# Patient Record
Sex: FEMALE | Race: WHITE | Employment: OTHER | URBAN - NONMETROPOLITAN AREA
[De-identification: names, ages, dates, MRNs, and addresses within clinical notes are randomized per-mention and may not be internally consistent; named-entity substitution may affect disease eponyms.]

---

## 2011-12-14 LAB
CHOLESTEROL, TOTAL: 256 MG/DL
CHOLESTEROL/HDL RATIO: ABNORMAL
HDLC SERPL-MCNC: 48 MG/DL (ref 35–70)
LDL CHOLESTEROL CALCULATED: 202 MG/DL (ref 0–160)
TRIGL SERPL-MCNC: 166 MG/DL
VLDLC SERPL CALC-MCNC: 33 MG/DL

## 2017-05-13 ENCOUNTER — NURSE TRIAGE (OUTPATIENT)
Dept: ADMINISTRATIVE | Age: 57
End: 2017-05-13

## 2017-05-15 PROBLEM — I63.311 CEREBROVASCULAR ACCIDENT (CVA) DUE TO THROMBOSIS OF RIGHT MIDDLE CEREBRAL ARTERY (HCC): Status: ACTIVE | Noted: 2017-05-15

## 2017-05-19 PROBLEM — E78.5 DYSLIPIDEMIA: Status: ACTIVE | Noted: 2017-05-19

## 2017-05-31 ENCOUNTER — OFFICE VISIT (OUTPATIENT)
Dept: CARDIOLOGY | Age: 57
End: 2017-05-31

## 2017-05-31 VITALS
BODY MASS INDEX: 34.41 KG/M2 | HEART RATE: 77 BPM | SYSTOLIC BLOOD PRESSURE: 128 MMHG | WEIGHT: 206.5 LBS | HEIGHT: 65 IN | DIASTOLIC BLOOD PRESSURE: 86 MMHG

## 2017-05-31 DIAGNOSIS — I63.9 STROKE OF UNKNOWN CAUSE (HCC): ICD-10-CM

## 2017-05-31 DIAGNOSIS — R06.02 SOB (SHORTNESS OF BREATH): Primary | ICD-10-CM

## 2017-05-31 PROCEDURE — G8598 ASA/ANTIPLAT THER USED: HCPCS | Performed by: INTERNAL MEDICINE

## 2017-05-31 PROCEDURE — 3014F SCREEN MAMMO DOC REV: CPT | Performed by: INTERNAL MEDICINE

## 2017-05-31 PROCEDURE — 3017F COLORECTAL CA SCREEN DOC REV: CPT | Performed by: INTERNAL MEDICINE

## 2017-05-31 PROCEDURE — 1111F DSCHRG MED/CURRENT MED MERGE: CPT | Performed by: INTERNAL MEDICINE

## 2017-05-31 PROCEDURE — 4004F PT TOBACCO SCREEN RCVD TLK: CPT | Performed by: INTERNAL MEDICINE

## 2017-05-31 PROCEDURE — 93000 ELECTROCARDIOGRAM COMPLETE: CPT | Performed by: INTERNAL MEDICINE

## 2017-05-31 PROCEDURE — G8427 DOCREV CUR MEDS BY ELIG CLIN: HCPCS | Performed by: INTERNAL MEDICINE

## 2017-05-31 PROCEDURE — G8419 CALC BMI OUT NRM PARAM NOF/U: HCPCS | Performed by: INTERNAL MEDICINE

## 2017-05-31 PROCEDURE — 99204 OFFICE O/P NEW MOD 45 MIN: CPT | Performed by: INTERNAL MEDICINE

## 2017-05-31 ASSESSMENT — ENCOUNTER SYMPTOMS
BACK PAIN: 0
WHEEZING: 0
NAUSEA: 0
SORE THROAT: 0
VOMITING: 0
SHORTNESS OF BREATH: 0
CONSTIPATION: 0
RIGHT EYE: 0
LEFT EYE: 0
BLURRED VISION: 0
COUGH: 0
DOUBLE VISION: 0
DIARRHEA: 0
ABDOMINAL PAIN: 0

## 2017-06-14 ENCOUNTER — INITIAL CONSULT (OUTPATIENT)
Dept: PHYSICAL MEDICINE AND REHAB | Age: 57
End: 2017-06-14

## 2017-06-14 VITALS
DIASTOLIC BLOOD PRESSURE: 81 MMHG | BODY MASS INDEX: 35.12 KG/M2 | SYSTOLIC BLOOD PRESSURE: 137 MMHG | HEART RATE: 80 BPM | HEIGHT: 65 IN | WEIGHT: 210.8 LBS

## 2017-06-14 DIAGNOSIS — R90.89 ABNORMAL FINDING ON MRI OF BRAIN: ICD-10-CM

## 2017-06-14 DIAGNOSIS — I69.30 HISTORY OF STROKE WITH RESIDUAL DEFICIT: Primary | ICD-10-CM

## 2017-06-14 PROCEDURE — 3017F COLORECTAL CA SCREEN DOC REV: CPT | Performed by: PSYCHIATRY & NEUROLOGY

## 2017-06-14 PROCEDURE — G8427 DOCREV CUR MEDS BY ELIG CLIN: HCPCS | Performed by: PSYCHIATRY & NEUROLOGY

## 2017-06-14 PROCEDURE — 3014F SCREEN MAMMO DOC REV: CPT | Performed by: PSYCHIATRY & NEUROLOGY

## 2017-06-14 PROCEDURE — 99205 OFFICE O/P NEW HI 60 MIN: CPT | Performed by: PSYCHIATRY & NEUROLOGY

## 2017-06-14 PROCEDURE — 1111F DSCHRG MED/CURRENT MED MERGE: CPT | Performed by: PSYCHIATRY & NEUROLOGY

## 2017-06-14 PROCEDURE — G8417 CALC BMI ABV UP PARAM F/U: HCPCS | Performed by: PSYCHIATRY & NEUROLOGY

## 2017-06-14 PROCEDURE — 4004F PT TOBACCO SCREEN RCVD TLK: CPT | Performed by: PSYCHIATRY & NEUROLOGY

## 2017-06-14 PROCEDURE — G8598 ASA/ANTIPLAT THER USED: HCPCS | Performed by: PSYCHIATRY & NEUROLOGY

## 2017-06-14 RX ORDER — FOLIC ACID 1 MG/1
1 TABLET ORAL DAILY
Qty: 30 TABLET | Refills: 11 | Status: SHIPPED | OUTPATIENT
Start: 2017-06-14 | End: 2018-06-15 | Stop reason: SDUPTHER

## 2017-06-19 ENCOUNTER — TELEPHONE (OUTPATIENT)
Dept: PHYSICAL MEDICINE AND REHAB | Age: 57
End: 2017-06-19

## 2017-06-21 ENCOUNTER — OFFICE VISIT (OUTPATIENT)
Dept: PHYSICAL MEDICINE AND REHAB | Age: 57
End: 2017-06-21

## 2017-06-21 VITALS
DIASTOLIC BLOOD PRESSURE: 78 MMHG | BODY MASS INDEX: 35.49 KG/M2 | HEIGHT: 65 IN | SYSTOLIC BLOOD PRESSURE: 142 MMHG | WEIGHT: 213 LBS | HEART RATE: 82 BPM

## 2017-06-21 DIAGNOSIS — I69.30 HISTORY OF STROKE WITH RESIDUAL DEFICIT: Primary | ICD-10-CM

## 2017-06-21 DIAGNOSIS — R90.89 ABNORMAL FINDING ON MRI OF BRAIN: ICD-10-CM

## 2017-06-21 PROCEDURE — 99213 OFFICE O/P EST LOW 20 MIN: CPT | Performed by: PSYCHIATRY & NEUROLOGY

## 2017-06-21 PROCEDURE — G8417 CALC BMI ABV UP PARAM F/U: HCPCS | Performed by: PSYCHIATRY & NEUROLOGY

## 2017-06-21 PROCEDURE — G8427 DOCREV CUR MEDS BY ELIG CLIN: HCPCS | Performed by: PSYCHIATRY & NEUROLOGY

## 2017-06-21 PROCEDURE — 3014F SCREEN MAMMO DOC REV: CPT | Performed by: PSYCHIATRY & NEUROLOGY

## 2017-06-21 PROCEDURE — 3017F COLORECTAL CA SCREEN DOC REV: CPT | Performed by: PSYCHIATRY & NEUROLOGY

## 2017-06-21 PROCEDURE — 4004F PT TOBACCO SCREEN RCVD TLK: CPT | Performed by: PSYCHIATRY & NEUROLOGY

## 2017-06-21 PROCEDURE — G8598 ASA/ANTIPLAT THER USED: HCPCS | Performed by: PSYCHIATRY & NEUROLOGY

## 2017-06-22 PROBLEM — I63.9 CRYPTOGENIC STROKE (HCC): Status: ACTIVE | Noted: 2017-05-15

## 2017-07-20 ENCOUNTER — NURSE ONLY (OUTPATIENT)
Dept: CARDIOLOGY CLINIC | Age: 57
End: 2017-07-20
Payer: MEDICARE

## 2017-07-20 DIAGNOSIS — Z45.09 ENCOUNTER FOR ELECTRONIC ANALYSIS OF REVEAL EVENT RECORDER: Primary | ICD-10-CM

## 2017-07-20 PROCEDURE — 93285 PRGRMG DEV EVAL SCRMS IP: CPT | Performed by: INTERNAL MEDICINE

## 2017-08-25 ENCOUNTER — PROCEDURE VISIT (OUTPATIENT)
Dept: CARDIOLOGY CLINIC | Age: 57
End: 2017-08-25
Payer: MEDICARE

## 2017-08-25 DIAGNOSIS — Z45.09 ENCOUNTER FOR ELECTRONIC ANALYSIS OF REVEAL EVENT RECORDER: Primary | ICD-10-CM

## 2017-08-25 PROCEDURE — 93298 REM INTERROG DEV EVAL SCRMS: CPT | Performed by: INTERNAL MEDICINE

## 2017-08-28 ENCOUNTER — TELEPHONE (OUTPATIENT)
Dept: CARDIOLOGY CLINIC | Age: 57
End: 2017-08-28

## 2017-08-30 ENCOUNTER — OFFICE VISIT (OUTPATIENT)
Dept: NEUROLOGY | Age: 57
End: 2017-08-30
Payer: MEDICARE

## 2017-08-30 VITALS
HEART RATE: 81 BPM | HEIGHT: 65 IN | WEIGHT: 218 LBS | BODY MASS INDEX: 36.32 KG/M2 | DIASTOLIC BLOOD PRESSURE: 81 MMHG | SYSTOLIC BLOOD PRESSURE: 134 MMHG

## 2017-08-30 DIAGNOSIS — R90.89 ABNORMAL FINDING ON MRI OF BRAIN: ICD-10-CM

## 2017-08-30 DIAGNOSIS — I69.30 HISTORY OF STROKE WITH RESIDUAL DEFICIT: Primary | ICD-10-CM

## 2017-08-30 PROCEDURE — 3017F COLORECTAL CA SCREEN DOC REV: CPT | Performed by: PSYCHIATRY & NEUROLOGY

## 2017-08-30 PROCEDURE — 4004F PT TOBACCO SCREEN RCVD TLK: CPT | Performed by: PSYCHIATRY & NEUROLOGY

## 2017-08-30 PROCEDURE — G8598 ASA/ANTIPLAT THER USED: HCPCS | Performed by: PSYCHIATRY & NEUROLOGY

## 2017-08-30 PROCEDURE — G8417 CALC BMI ABV UP PARAM F/U: HCPCS | Performed by: PSYCHIATRY & NEUROLOGY

## 2017-08-30 PROCEDURE — 99213 OFFICE O/P EST LOW 20 MIN: CPT | Performed by: PSYCHIATRY & NEUROLOGY

## 2017-08-30 PROCEDURE — G8427 DOCREV CUR MEDS BY ELIG CLIN: HCPCS | Performed by: PSYCHIATRY & NEUROLOGY

## 2017-08-30 PROCEDURE — 3014F SCREEN MAMMO DOC REV: CPT | Performed by: PSYCHIATRY & NEUROLOGY

## 2017-09-25 ENCOUNTER — PROCEDURE VISIT (OUTPATIENT)
Dept: CARDIOLOGY CLINIC | Age: 57
End: 2017-09-25
Payer: MEDICARE

## 2017-09-25 DIAGNOSIS — Z45.09 ENCOUNTER FOR ELECTRONIC ANALYSIS OF REVEAL EVENT RECORDER: Primary | ICD-10-CM

## 2017-09-25 PROCEDURE — 93298 REM INTERROG DEV EVAL SCRMS: CPT | Performed by: INTERNAL MEDICINE

## 2017-09-28 ENCOUNTER — TELEPHONE (OUTPATIENT)
Dept: CARDIOLOGY CLINIC | Age: 57
End: 2017-09-28

## 2017-10-26 ENCOUNTER — PROCEDURE VISIT (OUTPATIENT)
Dept: CARDIOLOGY CLINIC | Age: 57
End: 2017-10-26
Payer: MEDICARE

## 2017-10-26 DIAGNOSIS — Z45.09 ENCOUNTER FOR ELECTRONIC ANALYSIS OF REVEAL EVENT RECORDER: Primary | ICD-10-CM

## 2017-10-26 PROCEDURE — 93298 REM INTERROG DEV EVAL SCRMS: CPT | Performed by: INTERNAL MEDICINE

## 2017-12-01 ENCOUNTER — PROCEDURE VISIT (OUTPATIENT)
Dept: CARDIOLOGY CLINIC | Age: 57
End: 2017-12-01
Payer: MEDICARE

## 2017-12-01 DIAGNOSIS — Z45.09 ENCOUNTER FOR ELECTRONIC ANALYSIS OF REVEAL EVENT RECORDER: Primary | ICD-10-CM

## 2017-12-01 PROCEDURE — 93298 REM INTERROG DEV EVAL SCRMS: CPT | Performed by: INTERNAL MEDICINE

## 2017-12-08 ENCOUNTER — TELEPHONE (OUTPATIENT)
Dept: CARDIOLOGY CLINIC | Age: 57
End: 2017-12-08

## 2017-12-08 NOTE — TELEPHONE ENCOUNTER
UNSCHEDULED LINQ  APPEARS THAT PT HAD A 3 SECOND PAUSE ON 12/06/17 @ 18:43PM  PT NOTIFIED AND SHE DOES NOT RECALL HAVING ANY SYMPTOMS AT THAT TIME. SHE SAID SHE DOES HAVE A VERY BAD COLD.  NO DX OF SLEEP APNEA

## 2018-01-02 ENCOUNTER — PROCEDURE VISIT (OUTPATIENT)
Dept: CARDIOLOGY CLINIC | Age: 58
End: 2018-01-02
Payer: MEDICARE

## 2018-01-02 DIAGNOSIS — Z45.09 ENCOUNTER FOR ELECTRONIC ANALYSIS OF REVEAL EVENT RECORDER: Primary | ICD-10-CM

## 2018-01-02 PROCEDURE — 93298 REM INTERROG DEV EVAL SCRMS: CPT | Performed by: INTERNAL MEDICINE

## 2018-02-08 ENCOUNTER — PROCEDURE VISIT (OUTPATIENT)
Dept: CARDIOLOGY CLINIC | Age: 58
End: 2018-02-08
Payer: MEDICARE

## 2018-02-08 DIAGNOSIS — Z45.09 ENCOUNTER FOR ELECTRONIC ANALYSIS OF REVEAL EVENT RECORDER: Primary | ICD-10-CM

## 2018-02-08 PROCEDURE — 93298 REM INTERROG DEV EVAL SCRMS: CPT | Performed by: INTERNAL MEDICINE

## 2018-02-28 ENCOUNTER — OFFICE VISIT (OUTPATIENT)
Dept: NEUROLOGY | Age: 58
End: 2018-02-28
Payer: MEDICARE

## 2018-02-28 VITALS
DIASTOLIC BLOOD PRESSURE: 86 MMHG | SYSTOLIC BLOOD PRESSURE: 126 MMHG | BODY MASS INDEX: 37.82 KG/M2 | WEIGHT: 227 LBS | HEART RATE: 83 BPM | HEIGHT: 65 IN

## 2018-02-28 DIAGNOSIS — I69.30 HISTORY OF STROKE WITH RESIDUAL DEFICIT: Primary | ICD-10-CM

## 2018-02-28 PROCEDURE — G8599 NO ASA/ANTIPLAT THER USE RNG: HCPCS | Performed by: PSYCHIATRY & NEUROLOGY

## 2018-02-28 PROCEDURE — 3017F COLORECTAL CA SCREEN DOC REV: CPT | Performed by: PSYCHIATRY & NEUROLOGY

## 2018-02-28 PROCEDURE — G8417 CALC BMI ABV UP PARAM F/U: HCPCS | Performed by: PSYCHIATRY & NEUROLOGY

## 2018-02-28 PROCEDURE — 4004F PT TOBACCO SCREEN RCVD TLK: CPT | Performed by: PSYCHIATRY & NEUROLOGY

## 2018-02-28 PROCEDURE — G8427 DOCREV CUR MEDS BY ELIG CLIN: HCPCS | Performed by: PSYCHIATRY & NEUROLOGY

## 2018-02-28 PROCEDURE — 99213 OFFICE O/P EST LOW 20 MIN: CPT | Performed by: PSYCHIATRY & NEUROLOGY

## 2018-02-28 PROCEDURE — 3014F SCREEN MAMMO DOC REV: CPT | Performed by: PSYCHIATRY & NEUROLOGY

## 2018-02-28 PROCEDURE — G8484 FLU IMMUNIZE NO ADMIN: HCPCS | Performed by: PSYCHIATRY & NEUROLOGY

## 2018-02-28 NOTE — PATIENT INSTRUCTIONS
1. Continue with Folic acid 1 mg daily. Refills given. 2. Continue with antiplatelets. 3. Modify risk factors for stroke (blood pressure, cholesterol, diabetes, smoking cessation etc.. Control)   4. Continue with Cardiology work up with loop recorder. 5. Need to quit smoking. 6. Call with any new symptoms or concerns. 7. Follow up as needed.

## 2018-02-28 NOTE — PROGRESS NOTES
daily (before meals and nightly) (Patient taking differently: Take 10 mg by mouth 4 times daily as needed ), Disp: 30 capsule, Rfl: 3    albuterol sulfate HFA (PROAIR HFA) 108 (90 BASE) MCG/ACT inhaler, Inhale 2 puffs into the lungs every 6 hours as needed for Wheezing, Disp: 1 Inhaler, Rfl: 3    amLODIPine (NORVASC) 10 MG tablet, Take 1 tablet by mouth daily, Disp: 30 tablet, Rfl: 5      PE:   Vitals:    02/28/18 1015   BP: 126/86   Site: Left Arm   Position: Sitting   Pulse: 83   Weight: 227 lb (103 kg)   Height: 5' 5\" (1.651 m)     General Appearance:  alert, cooperative and obese  Skin:  Skin color, texture, turgor normal. No rashes or lesions. Gen: AO3, NAD, Language is Intact. Head: PERRL, EOMI, no icterus  Neck: There is no carotid bruits. The Neck is supple. Neuro: CN 2-12 grossly intact with no focal deficits. Power 5/5 Throughout symmetric, Reflexes are symmetric. Long tracts are intact. Cerebellar exam is Intact. Sensory exam is intact to light touch. Gait is intact. Musculoskeletal:  Has no hand arthritis, no limitation of ROM in any of the four extremities.    Lower extremities no edema        DATA:  Results for orders placed or performed during the hospital encounter of 06/14/17   EAN Screen With Reflex   Result Value Ref Range    EAN SCREEN None Detected None Detec   Anti-DNA Antibody, Double-Stranded   Result Value Ref Range    dsDNA Ab SEE BELOW    Sedimentation Rate   Result Value Ref Range    Sed Rate 15 0 - 20 mm/hr   Rheumatoid Factor   Result Value Ref Range    Rheumatoid Factor < 10 0 - 13 IU/mL   CBC   Result Value Ref Range    WBC 6.2 4.8 - 10.8 thou/mm3    RBC 5.05 4.20 - 5.40 mill/mm3    Hemoglobin 15.3 12.0 - 16.0 gm/dl    Hematocrit 46.4 37.0 - 47.0 %    MCV 92.0 81.0 - 99.0 fL    MCH 30.3 27.0 - 31.0 pg    MCHC 33.0 33.0 - 37.0 gm/dl    RDW 14.4 11.5 - 14.5 %    Platelets 528 804 - 462 thou/mm3    MPV 9.4 7.4 - 10.4 mcm   Basic Metabolic Panel   Result Value Ref Range    Sodium 144 135 - 145 meq/L    Potassium 4.2 3.5 - 5.2 meq/L    Chloride 103 98 - 111 meq/L    CO2 26 23 - 33 meq/L    Glucose 86 70 - 108 mg/dL    BUN 17 7 - 22 mg/dL    CREATININE 1.0 0.4 - 1.2 mg/dL    Calcium 9.9 8.5 - 10.5 mg/dL   Anti SSA   Result Value Ref Range    Anti SSA Not Detected    Anti SSB   Result Value Ref Range    Anti SSB 1 0 - 40 AU/mL   Anion Gap   Result Value Ref Range    Anion Gap 15.0 8.0 - 16.0 meq/L   Glomerular Filtration Rate, Estimated   Result Value Ref Range    Est, Glom Filt Rate 57 (A) ml/min/1.73m2        MRI Brain:  6/16/2017    I reviewed the MRI brain and agree with interpretation. FINDINGS:       The brain parenchymal volume is age appropriate. There is redemonstration of multiple foci of hyperintense T2 signal within the bilateral deep cerebral white matter. Edema is again noted along the cortical margins corresponding to areas of edema from    prior infarcts. No impeded diffusion is identified to suggest new, acute infarct. The ventricles are midline without evidence of hydrocephalus. Small areas of susceptibility corresponding to the areas of prior infarct likely represent microhemorrhage. No    mass, mass effect or extra-axial fluid collection is identified. The basal cisterns and visualized vascular flow voids are patent. The pituitary, brain stem and cervical medullary junction are within normal limits. There is enhancement also    corresponding to the areas of edema within the right superior temporal lobe and posterior lateral right temporal lobe.       The visualized orbits are unremarkable. Partial fluid opacification is again noted of the bilateral mastoid air cells and there is mucosal thickening within the sphenoid and ethmoid sinuses, similar to the prior exam.           Impression        Expected evolutionary changes are noted of multiple, punctate infarcts involving the right frontal, temporal and parietal lobes. No new infarct is identified. Assessment:    1. History of stroke with residual deficit        The patient denies any new symptoms. She is still undergoing the loop recorder, she had 3 second pause in her hear rhythm. She is doing well. She is on Plavix and Folic acid 1 mg daily. she is still smoking. She was counseled again about quitting smoking again. No headaches. Her exam is stable. After detailed discussion with patient we agreed on the following plan. Plan:  1. Continue with Folic acid 1 mg daily. Refills given. 2. Continue with antiplatelets. 3. Modify risk factors for stroke (blood pressure, cholesterol, diabetes, smoking cessation etc.. Control)   4. Continue with Cardiology work up with loop recorder. 5. Need to quit smoking. 6. Call with any new symptoms or concerns. 7. Follow up as needed. Please call if any questions.      Leoncio Bob MD

## 2018-03-13 ENCOUNTER — PROCEDURE VISIT (OUTPATIENT)
Dept: CARDIOLOGY CLINIC | Age: 58
End: 2018-03-13
Payer: MEDICARE

## 2018-03-13 DIAGNOSIS — I63.9 CRYPTOGENIC STROKE (HCC): Primary | ICD-10-CM

## 2018-03-13 PROCEDURE — 93298 REM INTERROG DEV EVAL SCRMS: CPT | Performed by: INTERNAL MEDICINE

## 2018-03-15 ENCOUNTER — APPOINTMENT (OUTPATIENT)
Dept: GENERAL RADIOLOGY | Age: 58
End: 2018-03-15
Payer: MEDICARE

## 2018-03-15 ENCOUNTER — HOSPITAL ENCOUNTER (EMERGENCY)
Age: 58
Discharge: HOME | End: 2018-03-16
Payer: MEDICARE

## 2018-03-15 ENCOUNTER — NURSE TRIAGE (OUTPATIENT)
Dept: ADMINISTRATIVE | Age: 58
End: 2018-03-15

## 2018-03-15 ENCOUNTER — APPOINTMENT (OUTPATIENT)
Dept: CT IMAGING | Age: 58
End: 2018-03-15
Payer: MEDICARE

## 2018-03-15 DIAGNOSIS — K52.9 GASTROENTERITIS: Primary | ICD-10-CM

## 2018-03-15 LAB
ALBUMIN SERPL-MCNC: 4.5 G/DL (ref 3.5–5.1)
ALP BLD-CCNC: 130 U/L (ref 38–126)
ALT SERPL-CCNC: 17 U/L (ref 11–66)
ANION GAP SERPL CALCULATED.3IONS-SCNC: 16 MEQ/L (ref 8–16)
ANISOCYTOSIS: ABNORMAL
AST SERPL-CCNC: 16 U/L (ref 5–40)
BASOPHILS # BLD: 0.3 %
BASOPHILS ABSOLUTE: 0 THOU/MM3 (ref 0–0.1)
BILIRUB SERPL-MCNC: 0.5 MG/DL (ref 0.3–1.2)
BILIRUBIN DIRECT: < 0.2 MG/DL (ref 0–0.3)
BUN BLDV-MCNC: 15 MG/DL (ref 7–22)
CALCIUM SERPL-MCNC: 9.6 MG/DL (ref 8.5–10.5)
CHLORIDE BLD-SCNC: 100 MEQ/L (ref 98–111)
CO2: 21 MEQ/L (ref 23–33)
CREAT SERPL-MCNC: 0.7 MG/DL (ref 0.4–1.2)
D-DIMER QUANTITATIVE: 414 NG/ML FEU (ref 0–500)
EKG ATRIAL RATE: 91 BPM
EKG P AXIS: 73 DEGREES
EKG P-R INTERVAL: 160 MS
EKG Q-T INTERVAL: 406 MS
EKG QRS DURATION: 130 MS
EKG QTC CALCULATION (BAZETT): 499 MS
EKG R AXIS: 48 DEGREES
EKG T AXIS: 102 DEGREES
EKG VENTRICULAR RATE: 91 BPM
EOSINOPHIL # BLD: 0 %
EOSINOPHILS ABSOLUTE: 0 THOU/MM3 (ref 0–0.4)
GFR SERPL CREATININE-BSD FRML MDRD: 86 ML/MIN/1.73M2
GLUCOSE BLD-MCNC: 141 MG/DL (ref 70–108)
HCT VFR BLD CALC: 48 % (ref 37–47)
HEMOGLOBIN: 15.7 GM/DL (ref 12–16)
LIPASE: 28.6 U/L (ref 5.6–51.3)
LYMPHOCYTES # BLD: 10 %
LYMPHOCYTES ABSOLUTE: 1.1 THOU/MM3 (ref 1–4.8)
MAGNESIUM: 2 MG/DL (ref 1.6–2.4)
MCH RBC QN AUTO: 29.6 PG (ref 27–31)
MCHC RBC AUTO-ENTMCNC: 32.7 GM/DL (ref 33–37)
MCV RBC AUTO: 90.6 FL (ref 81–99)
MONOCYTES # BLD: 3.5 %
MONOCYTES ABSOLUTE: 0.4 THOU/MM3 (ref 0.4–1.3)
NUCLEATED RED BLOOD CELLS: 0 /100 WBC
OSMOLALITY CALCULATION: 277 MOSMOL/KG (ref 275–300)
PDW BLD-RTO: 14.8 % (ref 11.5–14.5)
PLATELET # BLD: 226 THOU/MM3 (ref 130–400)
PMV BLD AUTO: 10.1 FL (ref 7.4–10.4)
POTASSIUM SERPL-SCNC: 4 MEQ/L (ref 3.5–5.2)
PRO-BNP: 165.1 PG/ML (ref 0–900)
RBC # BLD: 5.29 MILL/MM3 (ref 4.2–5.4)
SEG NEUTROPHILS: 86.2 %
SEGMENTED NEUTROPHILS ABSOLUTE COUNT: 9.7 THOU/MM3 (ref 1.8–7.7)
SODIUM BLD-SCNC: 137 MEQ/L (ref 135–145)
TOTAL PROTEIN: 7.3 G/DL (ref 6.1–8)
TROPONIN T: < 0.01 NG/ML
WBC # BLD: 11.2 THOU/MM3 (ref 4.8–10.8)

## 2018-03-15 PROCEDURE — 96376 TX/PRO/DX INJ SAME DRUG ADON: CPT

## 2018-03-15 PROCEDURE — 84484 ASSAY OF TROPONIN QUANT: CPT

## 2018-03-15 PROCEDURE — 36415 COLL VENOUS BLD VENIPUNCTURE: CPT

## 2018-03-15 PROCEDURE — 93005 ELECTROCARDIOGRAM TRACING: CPT | Performed by: NURSE PRACTITIONER

## 2018-03-15 PROCEDURE — C9113 INJ PANTOPRAZOLE SODIUM, VIA: HCPCS | Performed by: NURSE PRACTITIONER

## 2018-03-15 PROCEDURE — 96374 THER/PROPH/DIAG INJ IV PUSH: CPT

## 2018-03-15 PROCEDURE — 96375 TX/PRO/DX INJ NEW DRUG ADDON: CPT

## 2018-03-15 PROCEDURE — 74177 CT ABD & PELVIS W/CONTRAST: CPT

## 2018-03-15 PROCEDURE — 80053 COMPREHEN METABOLIC PANEL: CPT

## 2018-03-15 PROCEDURE — 6360000002 HC RX W HCPCS: Performed by: NURSE PRACTITIONER

## 2018-03-15 PROCEDURE — 83735 ASSAY OF MAGNESIUM: CPT

## 2018-03-15 PROCEDURE — 83880 ASSAY OF NATRIURETIC PEPTIDE: CPT

## 2018-03-15 PROCEDURE — 99285 EMERGENCY DEPT VISIT HI MDM: CPT

## 2018-03-15 PROCEDURE — 85379 FIBRIN DEGRADATION QUANT: CPT

## 2018-03-15 PROCEDURE — 6360000004 HC RX CONTRAST MEDICATION: Performed by: NURSE PRACTITIONER

## 2018-03-15 PROCEDURE — 85025 COMPLETE CBC W/AUTO DIFF WBC: CPT

## 2018-03-15 PROCEDURE — 83690 ASSAY OF LIPASE: CPT

## 2018-03-15 PROCEDURE — 82248 BILIRUBIN DIRECT: CPT

## 2018-03-15 PROCEDURE — 71045 X-RAY EXAM CHEST 1 VIEW: CPT

## 2018-03-15 RX ORDER — ONDANSETRON 2 MG/ML
4 INJECTION INTRAMUSCULAR; INTRAVENOUS ONCE
Status: COMPLETED | OUTPATIENT
Start: 2018-03-15 | End: 2018-03-15

## 2018-03-15 RX ORDER — PANTOPRAZOLE SODIUM 40 MG/10ML
40 INJECTION, POWDER, LYOPHILIZED, FOR SOLUTION INTRAVENOUS ONCE
Status: COMPLETED | OUTPATIENT
Start: 2018-03-15 | End: 2018-03-15

## 2018-03-15 RX ORDER — MELOXICAM 15 MG/1
15 TABLET ORAL DAILY
COMMUNITY
End: 2018-06-12

## 2018-03-15 RX ORDER — BENZONATATE 100 MG/1
100 CAPSULE ORAL 3 TIMES DAILY PRN
COMMUNITY
End: 2019-02-26 | Stop reason: SDUPTHER

## 2018-03-15 RX ORDER — MORPHINE SULFATE 4 MG/ML
4 INJECTION, SOLUTION INTRAMUSCULAR; INTRAVENOUS ONCE
Status: COMPLETED | OUTPATIENT
Start: 2018-03-15 | End: 2018-03-15

## 2018-03-15 RX ADMIN — ONDANSETRON 4 MG: 2 INJECTION INTRAMUSCULAR; INTRAVENOUS at 22:52

## 2018-03-15 RX ADMIN — IOPAMIDOL 80 ML: 755 INJECTION, SOLUTION INTRAVENOUS at 23:12

## 2018-03-15 RX ADMIN — ONDANSETRON 4 MG: 2 INJECTION INTRAMUSCULAR; INTRAVENOUS at 21:15

## 2018-03-15 RX ADMIN — PANTOPRAZOLE SODIUM 40 MG: 40 INJECTION, POWDER, FOR SOLUTION INTRAVENOUS at 21:15

## 2018-03-15 RX ADMIN — MORPHINE SULFATE 4 MG: 4 INJECTION, SOLUTION INTRAMUSCULAR; INTRAVENOUS at 21:15

## 2018-03-15 ASSESSMENT — PAIN DESCRIPTION - PAIN TYPE
TYPE: ACUTE PAIN
TYPE: ACUTE PAIN

## 2018-03-15 ASSESSMENT — PAIN SCALES - GENERAL
PAINLEVEL_OUTOF10: 1
PAINLEVEL_OUTOF10: 10

## 2018-03-15 ASSESSMENT — ENCOUNTER SYMPTOMS
COUGH: 0
NAUSEA: 1
BACK PAIN: 1
ABDOMINAL PAIN: 1
CONSTIPATION: 0
CHEST TIGHTNESS: 0
VOMITING: 1
WHEEZING: 0
ABDOMINAL DISTENTION: 1
SORE THROAT: 0
BLOOD IN STOOL: 0
DIARRHEA: 1
SHORTNESS OF BREATH: 0
RHINORRHEA: 0

## 2018-03-15 ASSESSMENT — PAIN DESCRIPTION - LOCATION
LOCATION: ABDOMEN
LOCATION: ABDOMEN

## 2018-03-15 ASSESSMENT — PAIN DESCRIPTION - DESCRIPTORS
DESCRIPTORS: ACHING
DESCRIPTORS: ACHING

## 2018-03-15 NOTE — TELEPHONE ENCOUNTER
Reason for Disposition   [1] MILD to MODERATE vomiting (e.g., 1-5 times/day) AND [2] abdomen looks much more swollen than usual    Answer Assessment - Initial Assessment Questions  1. VOMITING SEVERITY: \"How many times have you vomited in the past 24 hours? \"      - MILD:  1 - 2 times/day     - MODERATE: 3 - 5 times/day, decreased oral intake without significant weight loss or symptoms of dehydration     - SEVERE: 6 or more times/day, vomits everything or nearly everything, with significant weight loss, symptoms of dehydration       3-4 times today with vomiting, diarrhea about 3 time today  2. ONSET: \"When did the vomiting begin? \"       This morning about 6 am, last emesis 2 hrs ago  3. FLUIDS: \"What fluids or food have you vomited up today? \" \"Have you been able to keep any fluids down? \"      Drank water and kept it down- maybe 6 ox total  4. ABDOMINAL PAIN: Ainsley Sp your having any abdominal pain? \" If yes : \"How bad is it and what does it feel like? \" (e.g., crampy, dull, intermittent, constant)       Last urine 1 1/2 hr ago, abd pain entire abdomen- rated pain 10 earlier sitting up- now rated- 3-4 but acts like the pain is worse, has pancreatitis 4 yrs ago  5. DIARRHEA: \"Is there any diarrhea? \" If so, ask: \"How many times today? \"       3 times today- brown liquid  6. CONTACTS: \"Is there anyone else in the family with the same symptoms? \"       none  7. CAUSE: \"What do you think is causing your vomiting? \"      Not sure  8. HYDRATION STATUS: \"Any signs of dehydration? \" (e.g., dry mouth [not only dry lips], too weak to stand) \"When did you last urinate? \"      No signs of dehydration,   9. OTHER SYMPTOMS: \"Do you have any other symptoms? \" (e.g., fever, headache, vertigo, vomiting blood or coffee grounds)      No HA, no dizziness, emesis yellowish clear liquid  10. PREGNANCY: \"Is there any chance you are pregnant? \" \"When was your last menstrual period? \"      NA    Protocols used: VOMITING-ADULT-AH

## 2018-03-15 NOTE — TELEPHONE ENCOUNTER
Message from Dez Palacios sent at 3/15/2018  7:29 PM EDT     Summary: vomiting and diarrhea- feels similar to when she had a stroke in the past    Vomiting and diarrhea all day. Angélica Louis has had several strokes before. Angélica Louis said she feels similar to how she felt the last time she had a stroke.  Please advise. Daughter Gualberto Mcdermott states, Azalia Perdue mom feels like she did when she had a stroke in the past because she has been sweating at night. Since this morning about 6 am she has been having vomiting and diarrhea. She denies any HA or blurred vision or weakness in arms or legs. \"

## 2018-03-16 VITALS
DIASTOLIC BLOOD PRESSURE: 65 MMHG | RESPIRATION RATE: 18 BRPM | SYSTOLIC BLOOD PRESSURE: 130 MMHG | OXYGEN SATURATION: 95 % | HEIGHT: 65 IN | TEMPERATURE: 97.7 F | BODY MASS INDEX: 37.65 KG/M2 | HEART RATE: 81 BPM | WEIGHT: 226 LBS

## 2018-03-16 LAB
AMPHETAMINE+METHAMPHETAMINE URINE SCREEN: NEGATIVE
BACTERIA: ABNORMAL /HPF
BARBITURATE QUANTITATIVE URINE: NEGATIVE
BENZODIAZEPINE QUANTITATIVE URINE: NEGATIVE
BILIRUBIN URINE: NEGATIVE
BLOOD, URINE: ABNORMAL
CANNABINOID QUANTITATIVE URINE: POSITIVE
CASTS 2: ABNORMAL /LPF
CASTS UA: ABNORMAL /LPF
CHARACTER, URINE: CLEAR
COCAINE METABOLITE QUANTITATIVE URINE: NEGATIVE
COLOR: YELLOW
CRYSTALS, UA: ABNORMAL
EPITHELIAL CELLS, UA: ABNORMAL /HPF
GLUCOSE URINE: NEGATIVE MG/DL
KETONES, URINE: NEGATIVE
LEUKOCYTE ESTERASE, URINE: NEGATIVE
MISCELLANEOUS 2: ABNORMAL
NITRITE, URINE: NEGATIVE
OPIATES, URINE: POSITIVE
OXYCODONE: NEGATIVE
PH UA: 5.5
PHENCYCLIDINE QUANTITATIVE URINE: NEGATIVE
PROTEIN UA: NEGATIVE
RBC URINE: ABNORMAL /HPF
RENAL EPITHELIAL, UA: ABNORMAL
SPECIFIC GRAVITY, URINE: > 1.03 (ref 1–1.03)
TROPONIN T: < 0.01 NG/ML
UROBILINOGEN, URINE: 0.2 EU/DL
WBC UA: ABNORMAL /HPF
YEAST: ABNORMAL

## 2018-03-16 PROCEDURE — 84484 ASSAY OF TROPONIN QUANT: CPT

## 2018-03-16 PROCEDURE — 81001 URINALYSIS AUTO W/SCOPE: CPT

## 2018-03-16 PROCEDURE — 36415 COLL VENOUS BLD VENIPUNCTURE: CPT

## 2018-03-16 PROCEDURE — 96376 TX/PRO/DX INJ SAME DRUG ADON: CPT

## 2018-03-16 PROCEDURE — 6360000002 HC RX W HCPCS: Performed by: NURSE PRACTITIONER

## 2018-03-16 PROCEDURE — 2580000003 HC RX 258: Performed by: NURSE PRACTITIONER

## 2018-03-16 PROCEDURE — 80307 DRUG TEST PRSMV CHEM ANLYZR: CPT

## 2018-03-16 RX ORDER — ONDANSETRON 4 MG/1
4 TABLET, ORALLY DISINTEGRATING ORAL EVERY 8 HOURS PRN
Qty: 12 TABLET | Refills: 0 | Status: SHIPPED | OUTPATIENT
Start: 2018-03-16 | End: 2018-06-12

## 2018-03-16 RX ORDER — MORPHINE SULFATE 4 MG/ML
4 INJECTION, SOLUTION INTRAMUSCULAR; INTRAVENOUS ONCE
Status: COMPLETED | OUTPATIENT
Start: 2018-03-16 | End: 2018-03-16

## 2018-03-16 RX ORDER — ONDANSETRON 2 MG/ML
4 INJECTION INTRAMUSCULAR; INTRAVENOUS ONCE
Status: COMPLETED | OUTPATIENT
Start: 2018-03-16 | End: 2018-03-16

## 2018-03-16 RX ORDER — 0.9 % SODIUM CHLORIDE 0.9 %
1000 INTRAVENOUS SOLUTION INTRAVENOUS ONCE
Status: COMPLETED | OUTPATIENT
Start: 2018-03-16 | End: 2018-03-16

## 2018-03-16 RX ADMIN — MORPHINE SULFATE 4 MG: 4 INJECTION, SOLUTION INTRAMUSCULAR; INTRAVENOUS at 01:44

## 2018-03-16 RX ADMIN — SODIUM CHLORIDE 1000 ML: 9 INJECTION, SOLUTION INTRAVENOUS at 01:44

## 2018-03-16 RX ADMIN — ONDANSETRON 4 MG: 2 INJECTION INTRAMUSCULAR; INTRAVENOUS at 01:44

## 2018-03-16 ASSESSMENT — PAIN SCALES - GENERAL
PAINLEVEL_OUTOF10: 3
PAINLEVEL_OUTOF10: 7
PAINLEVEL_OUTOF10: 7

## 2018-03-16 NOTE — ED PROVIDER NOTES
Negative for difficulty urinating, dysuria and hematuria. Musculoskeletal: Positive for back pain and myalgias (generalized). Negative for joint swelling, neck pain and neck stiffness. Skin: Negative for pallor and rash. Neurological: Negative for dizziness, syncope, weakness, light-headedness, numbness and headaches. Hematological: Negative for adenopathy. Psychiatric/Behavioral: Negative for confusion and suicidal ideas. The patient is not nervous/anxious. PAST MEDICAL HISTORY    has a past medical history of AAA (abdominal aortic aneurysm) ([facility] [address] Arthritis; Cerebral artery occlusion with cerebral infarction ([facility] [address] Chronic bronchitis ([facility] [address] COPD (chronic obstructive pulmonary disease) ([facility] [address] GERD (gastroesophageal reflux disease); Hypertension; Pancreatitis; and Pancreatitis. SURGICAL HISTORY      has a past surgical history that includes  section; Carpal tunnel release (Bilateral); Abdomen surgery; Tonsillectomy; Upper gastrointestinal endoscopy (); Colonoscopy (14); and Insertable Cardiac Monitor.     CURRENT MEDICATIONS       Discharge Medication List as of 3/16/2018  2:31 AM      CONTINUE these medications which have NOT CHANGED    Details   meloxicam (MOBIC) 15 MG tablet Take 15 mg by mouth dailyHistorical Med      benzonatate (TESSALON) 100 MG capsule Take 100 mg by mouth 3 times daily as needed for CoughHistorical Med      folic acid (FOLVITE) 1 MG tablet Take 1 tablet by mouth daily, Disp-30 tablet, R-11Normal      buPROPion (WELLBUTRIN SR) 100 MG extended release tablet Take 1 tablet by mouth 2 times daily, Disp-60 tablet, R-3Normal      atorvastatin (LIPITOR) 80 MG tablet Take 1 tablet by mouth nightly, Disp-30 tablet, R-3Normal      clopidogrel (PLAVIX) 75 MG tablet Take 1 tablet by mouth daily, Disp-30 tablet, R-3Normal      famotidine (PEPCID) 20 MG tablet Take 1 tablet by mouth every evening, Disp-30 tablet, R-1Normal      amLODIPine (NORVASC) 10 MG tablet Take 1 tablet by mouth daily, Disp-30 tablet, R-5      dicyclomine (BENTYL) 10 MG capsule Take 1 capsule by mouth 4 times daily (before meals and nightly), Disp-30 capsule, R-3Print      albuterol sulfate HFA (PROAIR HFA) 108 (90 BASE) MCG/ACT inhaler Inhale 2 puffs into the lungs every 6 hours as needed for Wheezing, Disp-1 Inhaler, R-3             ALLERGIES     is allergic to aspirin; ibuprofen; spiriva handihaler [tiotropium bromide monohydrate]; and sulfa antibiotics. FAMILY HISTORY     indicated that her mother is . She indicated that her father is alive. She indicated that both of her brothers are alive. She indicated that her maternal grandmother is . She indicated that her maternal aunt is . She indicated that the status of her neg hx is unknown.    family history includes COPD in her father; Cancer in her father; Diabetes in her maternal aunt, maternal grandmother, and mother; Heart Disease in her father and mother; High Blood Pressure in her brother. SOCIAL HISTORY      reports that she has been smoking Cigarettes. She has a 20.00 pack-year smoking history. She has never used smokeless tobacco. She reports that she uses drugs, including Marijuana. She reports that she does not drink alcohol. PHYSICAL EXAM     INITIAL VITALS:  height is 5' 5\" (1.651 m) and weight is 226 lb (102.5 kg). Her oral temperature is 97.7 °F (36.5 °C). Her blood pressure is 130/65 and her pulse is 81. Her respiration is 18 and oxygen saturation is 95%. Physical Exam   Constitutional: She is oriented to person, place, and time. She appears well-developed and well-nourished. She is active and cooperative. Non-toxic appearance. HENT:   Head: Normocephalic and atraumatic. Right Ear: External ear normal.   Left Ear: External ear normal.   Eyes: Conjunctivae, EOM and lids are normal. Right eye exhibits no exudate. Left eye exhibits no exudate. No scleral icterus. Neck: Normal range of motion.  Neck report has been created using voice recognition software. It may contain minor errors which are inherent in voice recognition technology. **      Final report electronically signed by Dr. Maura Murphy on 3/15/2018 11:46 PM      XR CHEST PORTABLE   Final Result   Stable radiographic appearance of the chest. No evidence of an acute process. **This report has been created using voice recognition software. It may contain minor errors which are inherent in voice recognition technology. **      Final report electronically signed by Dr. Maura Murphy on 3/15/2018 9:19 PM          LABS:     Labs Reviewed   BASIC METABOLIC PANEL - Abnormal; Notable for the following:        Result Value    CO2 21 (*)     Glucose 141 (*)     All other components within normal limits   CBC WITH AUTO DIFFERENTIAL - Abnormal; Notable for the following:     WBC 11.2 (*)     Hematocrit 48.0 (*)     MCHC 32.7 (*)     RDW 14.8 (*)     Segs Absolute 9.7 (*)     All other components within normal limits   HEPATIC FUNCTION PANEL - Abnormal; Notable for the following:     Alkaline Phosphatase 130 (*)     All other components within normal limits   GLOMERULAR FILTRATION RATE, ESTIMATED - Abnormal; Notable for the following:     Est, Glom Filt Rate 86 (*)     All other components within normal limits   URINE WITH REFLEXED MICRO - Abnormal; Notable for the following:     Specific Gravity, Urine > 1.030 (*)     Blood, Urine TRACE (*)     All other components within normal limits   LIPASE   MAGNESIUM   BRAIN NATRIURETIC PEPTIDE   TROPONIN   URINE DRUG SCREEN   D-DIMER, QUANTITATIVE   ANION GAP   OSMOLALITY   TROPONIN       EMERGENCY DEPARTMENT COURSE:   Vitals:    Vitals:    03/15/18 2230 03/15/18 2322 03/16/18 0007 03/16/18 0145   BP: 121/84 129/71 128/79 130/65   Pulse: 86 83 84 81   Resp: 18 15 12 18   Temp:       TempSrc:       SpO2: 97% 97% 94% 95%   Weight:       Height:           8:57 PM: The patient was seen and were made to edit the dictations but occasionally words are mis-transcribed.)    The patient was given an opportunity to see the Emergency Attending. The patient voiced understanding that I was a Mid-Level Provider and was in agreement with being seen independently by myself. Scribe:  Lakia Kennedy 3/15/18 8:57 PM Scribing for and in the presence of Marco A Bauman CNP. Signed by: Hitesh Goodman, 03/16/18 6:16 AM    Provider:  I personally performed the services described in the documentation, reviewed and edited the documentation which was dictated to the scribe in my presence, and it accurately records my words and actions.     Marco A Bauman CNP 3/15/18 6:16 AM        Mills Lesches, CNP  03/16/18 4023

## 2018-03-16 NOTE — ED NOTES
Patient resting in bed with call light in reach states no needs at this time. Respirations are easy and unlabored at this time call light within reach plan of care reviewed with patient voices understanding.  elimination offered        David Wells RN  03/16/18 0140

## 2018-04-19 ENCOUNTER — PROCEDURE VISIT (OUTPATIENT)
Dept: CARDIOLOGY CLINIC | Age: 58
End: 2018-04-19

## 2018-04-19 DIAGNOSIS — Z45.09 ENCOUNTER FOR ELECTRONIC ANALYSIS OF REVEAL EVENT RECORDER: Primary | ICD-10-CM

## 2018-05-18 ENCOUNTER — TELEPHONE (OUTPATIENT)
Dept: FAMILY MEDICINE CLINIC | Age: 58
End: 2018-05-18

## 2018-05-24 ENCOUNTER — PROCEDURE VISIT (OUTPATIENT)
Dept: CARDIOLOGY CLINIC | Age: 58
End: 2018-05-24
Payer: MEDICARE

## 2018-05-24 DIAGNOSIS — Z45.09 ENCOUNTER FOR ELECTRONIC ANALYSIS OF REVEAL EVENT RECORDER: Primary | ICD-10-CM

## 2018-05-24 PROCEDURE — 93298 REM INTERROG DEV EVAL SCRMS: CPT | Performed by: INTERNAL MEDICINE

## 2018-05-24 PROCEDURE — 93299 PR REM INTERROG ICPMS/SCRMS <30 D TECH REVIEW: CPT | Performed by: INTERNAL MEDICINE

## 2018-06-12 ENCOUNTER — TELEPHONE (OUTPATIENT)
Dept: FAMILY MEDICINE CLINIC | Age: 58
End: 2018-06-12

## 2018-06-12 ENCOUNTER — OFFICE VISIT (OUTPATIENT)
Dept: FAMILY MEDICINE CLINIC | Age: 58
End: 2018-06-12
Payer: MEDICARE

## 2018-06-12 VITALS
WEIGHT: 226 LBS | HEIGHT: 65 IN | DIASTOLIC BLOOD PRESSURE: 78 MMHG | HEART RATE: 80 BPM | RESPIRATION RATE: 18 BRPM | BODY MASS INDEX: 37.65 KG/M2 | SYSTOLIC BLOOD PRESSURE: 138 MMHG | TEMPERATURE: 98.7 F

## 2018-06-12 DIAGNOSIS — J44.9 CHRONIC OBSTRUCTIVE PULMONARY DISEASE, UNSPECIFIED COPD TYPE (HCC): ICD-10-CM

## 2018-06-12 DIAGNOSIS — G89.29 CHRONIC BILATERAL LOW BACK PAIN WITH LEFT-SIDED SCIATICA: Primary | ICD-10-CM

## 2018-06-12 DIAGNOSIS — M54.42 CHRONIC BILATERAL LOW BACK PAIN WITH LEFT-SIDED SCIATICA: Primary | ICD-10-CM

## 2018-06-12 DIAGNOSIS — I10 ESSENTIAL HYPERTENSION: ICD-10-CM

## 2018-06-12 DIAGNOSIS — F17.210 NICOTINE DEPENDENCE, CIGARETTES, UNCOMPLICATED: ICD-10-CM

## 2018-06-12 DIAGNOSIS — E78.5 DYSLIPIDEMIA: ICD-10-CM

## 2018-06-12 PROBLEM — I63.9 CRYPTOGENIC STROKE (HCC): Status: RESOLVED | Noted: 2017-05-15 | Resolved: 2018-06-12

## 2018-06-12 PROBLEM — I69.30 HISTORY OF STROKE WITH RESIDUAL DEFICIT: Status: ACTIVE | Noted: 2017-05-15

## 2018-06-12 PROBLEM — I69.30 HISTORY OF STROKE WITH RESIDUAL DEFICIT: Chronic | Status: ACTIVE | Noted: 2017-05-15

## 2018-06-12 PROCEDURE — G0296 VISIT TO DETERM LDCT ELIG: HCPCS | Performed by: FAMILY MEDICINE

## 2018-06-12 PROCEDURE — G8926 SPIRO NO PERF OR DOC: HCPCS | Performed by: FAMILY MEDICINE

## 2018-06-12 PROCEDURE — 3023F SPIROM DOC REV: CPT | Performed by: FAMILY MEDICINE

## 2018-06-12 PROCEDURE — G8417 CALC BMI ABV UP PARAM F/U: HCPCS | Performed by: FAMILY MEDICINE

## 2018-06-12 PROCEDURE — G8427 DOCREV CUR MEDS BY ELIG CLIN: HCPCS | Performed by: FAMILY MEDICINE

## 2018-06-12 PROCEDURE — 3017F COLORECTAL CA SCREEN DOC REV: CPT | Performed by: FAMILY MEDICINE

## 2018-06-12 PROCEDURE — 99215 OFFICE O/P EST HI 40 MIN: CPT | Performed by: FAMILY MEDICINE

## 2018-06-12 PROCEDURE — 4004F PT TOBACCO SCREEN RCVD TLK: CPT | Performed by: FAMILY MEDICINE

## 2018-06-12 PROCEDURE — G8599 NO ASA/ANTIPLAT THER USE RNG: HCPCS | Performed by: FAMILY MEDICINE

## 2018-06-12 RX ORDER — ACETAMINOPHEN 325 MG/1
650-975 TABLET ORAL EVERY 6 HOURS PRN
COMMUNITY

## 2018-06-12 RX ORDER — VARENICLINE TARTRATE 1 MG/1
1 TABLET, FILM COATED ORAL 2 TIMES DAILY
Qty: 60 TABLET | Refills: 1 | Status: SHIPPED | OUTPATIENT
Start: 2018-06-12 | End: 2018-12-10 | Stop reason: SDUPTHER

## 2018-06-12 RX ORDER — VARENICLINE TARTRATE 25 MG
KIT ORAL
Qty: 1 EACH | Refills: 0 | Status: SHIPPED | OUTPATIENT
Start: 2018-06-12 | End: 2018-12-10 | Stop reason: SDUPTHER

## 2018-06-12 ASSESSMENT — PATIENT HEALTH QUESTIONNAIRE - PHQ9
SUM OF ALL RESPONSES TO PHQ9 QUESTIONS 1 & 2: 1
2. FEELING DOWN, DEPRESSED OR HOPELESS: 0
SUM OF ALL RESPONSES TO PHQ QUESTIONS 1-9: 1
1. LITTLE INTEREST OR PLEASURE IN DOING THINGS: 1

## 2018-06-15 DIAGNOSIS — I69.30 HISTORY OF STROKE WITH RESIDUAL DEFICIT: ICD-10-CM

## 2018-06-15 RX ORDER — FOLIC ACID 1 MG/1
1 TABLET ORAL DAILY
Qty: 30 TABLET | Refills: 11 | Status: SHIPPED | OUTPATIENT
Start: 2018-06-15 | End: 2019-07-06 | Stop reason: SDUPTHER

## 2018-06-21 ENCOUNTER — HOSPITAL ENCOUNTER (OUTPATIENT)
Dept: CT IMAGING | Age: 58
Discharge: HOME | End: 2018-06-21
Payer: MEDICARE

## 2018-06-21 ENCOUNTER — HOSPITAL ENCOUNTER (OUTPATIENT)
Age: 58
Discharge: HOME | End: 2018-06-21
Payer: MEDICARE

## 2018-06-21 ENCOUNTER — HOSPITAL ENCOUNTER (OUTPATIENT)
Dept: PULMONOLOGY | Age: 58
Discharge: HOME | End: 2018-06-21
Payer: MEDICARE

## 2018-06-21 DIAGNOSIS — R91.1 PULMONARY NODULE: Primary | ICD-10-CM

## 2018-06-21 DIAGNOSIS — J44.9 CHRONIC OBSTRUCTIVE PULMONARY DISEASE, UNSPECIFIED COPD TYPE (HCC): ICD-10-CM

## 2018-06-21 DIAGNOSIS — F17.210 NICOTINE DEPENDENCE, CIGARETTES, UNCOMPLICATED: ICD-10-CM

## 2018-06-21 DIAGNOSIS — E78.5 DYSLIPIDEMIA: ICD-10-CM

## 2018-06-21 DIAGNOSIS — I10 ESSENTIAL HYPERTENSION: ICD-10-CM

## 2018-06-21 LAB
ALBUMIN SERPL-MCNC: 4.2 G/DL (ref 3.5–5.1)
ALP BLD-CCNC: 110 U/L (ref 38–126)
ALT SERPL-CCNC: 13 U/L (ref 11–66)
ANION GAP SERPL CALCULATED.3IONS-SCNC: 13 MEQ/L (ref 8–16)
ANISOCYTOSIS: ABNORMAL
AST SERPL-CCNC: 14 U/L (ref 5–40)
BASOPHILS # BLD: 1 %
BASOPHILS ABSOLUTE: 0.1 THOU/MM3 (ref 0–0.1)
BILIRUB SERPL-MCNC: 0.3 MG/DL (ref 0.3–1.2)
BUN BLDV-MCNC: 16 MG/DL (ref 7–22)
CALCIUM SERPL-MCNC: 9.7 MG/DL (ref 8.5–10.5)
CHLORIDE BLD-SCNC: 105 MEQ/L (ref 98–111)
CHOLESTEROL, TOTAL: 238 MG/DL (ref 100–199)
CO2: 26 MEQ/L (ref 23–33)
CREAT SERPL-MCNC: 1 MG/DL (ref 0.4–1.2)
CREATININE, URINE: 271 MG/DL
EOSINOPHIL # BLD: 1.2 %
EOSINOPHILS ABSOLUTE: 0.1 THOU/MM3 (ref 0–0.4)
GFR SERPL CREATININE-BSD FRML MDRD: 57 ML/MIN/1.73M2
GLUCOSE BLD-MCNC: 101 MG/DL (ref 70–108)
HCT VFR BLD CALC: 46.6 % (ref 37–47)
HDLC SERPL-MCNC: 45 MG/DL
HEMOGLOBIN: 15.4 GM/DL (ref 12–16)
LDL CHOLESTEROL CALCULATED: 165 MG/DL
LYMPHOCYTES # BLD: 32.4 %
LYMPHOCYTES ABSOLUTE: 2.1 THOU/MM3 (ref 1–4.8)
MCH RBC QN AUTO: 30 PG (ref 27–31)
MCHC RBC AUTO-ENTMCNC: 33 GM/DL (ref 33–37)
MCV RBC AUTO: 91 FL (ref 81–99)
MICROALBUMIN UR-MCNC: < 1.2 MG/DL
MICROALBUMIN/CREAT UR-RTO: 4 MG/G (ref 0–30)
MONOCYTES # BLD: 7.1 %
MONOCYTES ABSOLUTE: 0.5 THOU/MM3 (ref 0.4–1.3)
NUCLEATED RED BLOOD CELLS: 0 /100 WBC
PDW BLD-RTO: 15.1 % (ref 11.5–14.5)
PLATELET # BLD: 236 THOU/MM3 (ref 130–400)
PMV BLD AUTO: 9.4 FL (ref 7.4–10.4)
POTASSIUM SERPL-SCNC: 4.6 MEQ/L (ref 3.5–5.2)
RBC # BLD: 5.12 MILL/MM3 (ref 4.2–5.4)
SEG NEUTROPHILS: 58.3 %
SEGMENTED NEUTROPHILS ABSOLUTE COUNT: 3.8 THOU/MM3 (ref 1.8–7.7)
SODIUM BLD-SCNC: 144 MEQ/L (ref 135–145)
TOTAL PROTEIN: 7 G/DL (ref 6.1–8)
TRIGL SERPL-MCNC: 139 MG/DL (ref 0–199)
TSH SERPL DL<=0.05 MIU/L-ACNC: 2.91 UIU/ML (ref 0.4–4.2)
WBC # BLD: 6.6 THOU/MM3 (ref 4.8–10.8)

## 2018-06-21 PROCEDURE — 36415 COLL VENOUS BLD VENIPUNCTURE: CPT

## 2018-06-21 PROCEDURE — 80053 COMPREHEN METABOLIC PANEL: CPT

## 2018-06-21 PROCEDURE — 82043 UR ALBUMIN QUANTITATIVE: CPT

## 2018-06-21 PROCEDURE — 94729 DIFFUSING CAPACITY: CPT

## 2018-06-21 PROCEDURE — G0297 LDCT FOR LUNG CA SCREEN: HCPCS

## 2018-06-21 PROCEDURE — 84443 ASSAY THYROID STIM HORMONE: CPT

## 2018-06-21 PROCEDURE — 80061 LIPID PANEL: CPT

## 2018-06-21 PROCEDURE — 94060 EVALUATION OF WHEEZING: CPT

## 2018-06-21 PROCEDURE — 94726 PLETHYSMOGRAPHY LUNG VOLUMES: CPT

## 2018-06-21 PROCEDURE — 85025 COMPLETE CBC W/AUTO DIFF WBC: CPT

## 2018-06-22 ENCOUNTER — TELEPHONE (OUTPATIENT)
Dept: FAMILY MEDICINE CLINIC | Age: 58
End: 2018-06-22

## 2018-06-25 ENCOUNTER — HOSPITAL ENCOUNTER (OUTPATIENT)
Dept: ULTRASOUND IMAGING | Age: 58
Discharge: HOME | End: 2018-06-25
Payer: MEDICARE

## 2018-06-25 ENCOUNTER — PROCEDURE VISIT (OUTPATIENT)
Dept: CARDIOLOGY CLINIC | Age: 58
End: 2018-06-25
Payer: MEDICARE

## 2018-06-25 DIAGNOSIS — Z45.09 ENCOUNTER FOR ELECTRONIC ANALYSIS OF REVEAL EVENT RECORDER: Primary | ICD-10-CM

## 2018-06-25 DIAGNOSIS — I71.40 ABDOMINAL AORTIC ANEURYSM (AAA) WITHOUT RUPTURE: ICD-10-CM

## 2018-06-25 PROCEDURE — 93299 PR REM INTERROG ICPMS/SCRMS <30 D TECH REVIEW: CPT | Performed by: INTERNAL MEDICINE

## 2018-06-25 PROCEDURE — 76775 US EXAM ABDO BACK WALL LIM: CPT

## 2018-06-25 PROCEDURE — 93298 REM INTERROG DEV EVAL SCRMS: CPT | Performed by: INTERNAL MEDICINE

## 2018-06-26 ENCOUNTER — TELEPHONE (OUTPATIENT)
Dept: FAMILY MEDICINE CLINIC | Age: 58
End: 2018-06-26

## 2018-07-13 ENCOUNTER — PROCEDURE VISIT (OUTPATIENT)
Dept: CARDIOLOGY CLINIC | Age: 58
End: 2018-07-13

## 2018-07-13 ENCOUNTER — TELEPHONE (OUTPATIENT)
Dept: CARDIOLOGY CLINIC | Age: 58
End: 2018-07-13

## 2018-07-13 DIAGNOSIS — Z45.09 ENCOUNTER FOR ELECTRONIC ANALYSIS OF REVEAL EVENT RECORDER: Primary | ICD-10-CM

## 2018-07-13 NOTE — PROGRESS NOTES
NC <31 days. 7/12/18 @ 6:37  3+ second pause     Call to house, discussed with Blanca Hall, states he thinks she might have been sleeping. Asked Radha Garrido to have her call the office if she remembers feeling anything different.  Verbalized understanding

## 2018-07-26 ENCOUNTER — NURSE TRIAGE (OUTPATIENT)
Dept: ADMINISTRATIVE | Age: 58
End: 2018-07-26

## 2018-07-30 ENCOUNTER — OFFICE VISIT (OUTPATIENT)
Dept: FAMILY MEDICINE CLINIC | Age: 58
End: 2018-07-30
Payer: MEDICARE

## 2018-07-30 VITALS
SYSTOLIC BLOOD PRESSURE: 124 MMHG | WEIGHT: 229 LBS | BODY MASS INDEX: 38.15 KG/M2 | RESPIRATION RATE: 20 BRPM | HEIGHT: 65 IN | HEART RATE: 88 BPM | DIASTOLIC BLOOD PRESSURE: 71 MMHG | TEMPERATURE: 97.6 F

## 2018-07-30 DIAGNOSIS — M54.42 CHRONIC BILATERAL LOW BACK PAIN WITH LEFT-SIDED SCIATICA: Primary | ICD-10-CM

## 2018-07-30 DIAGNOSIS — Z12.31 ENCOUNTER FOR SCREENING MAMMOGRAM FOR MALIGNANT NEOPLASM OF BREAST: ICD-10-CM

## 2018-07-30 DIAGNOSIS — J44.9 CHRONIC OBSTRUCTIVE PULMONARY DISEASE, UNSPECIFIED COPD TYPE (HCC): ICD-10-CM

## 2018-07-30 DIAGNOSIS — G89.29 CHRONIC BILATERAL LOW BACK PAIN WITH LEFT-SIDED SCIATICA: Primary | ICD-10-CM

## 2018-07-30 DIAGNOSIS — F17.211 CIGARETTE NICOTINE DEPENDENCE IN REMISSION: ICD-10-CM

## 2018-07-30 DIAGNOSIS — K86.1 CHRONIC PANCREATITIS, UNSPECIFIED PANCREATITIS TYPE (HCC): Chronic | ICD-10-CM

## 2018-07-30 DIAGNOSIS — I71.40 ABDOMINAL AORTIC ANEURYSM (AAA) WITHOUT RUPTURE: Chronic | ICD-10-CM

## 2018-07-30 DIAGNOSIS — E66.9 OBESITY (BMI 30-39.9): Chronic | ICD-10-CM

## 2018-07-30 DIAGNOSIS — R91.1 PULMONARY NODULE: ICD-10-CM

## 2018-07-30 DIAGNOSIS — E78.5 DYSLIPIDEMIA: ICD-10-CM

## 2018-07-30 PROCEDURE — 3017F COLORECTAL CA SCREEN DOC REV: CPT | Performed by: FAMILY MEDICINE

## 2018-07-30 PROCEDURE — 4004F PT TOBACCO SCREEN RCVD TLK: CPT | Performed by: FAMILY MEDICINE

## 2018-07-30 PROCEDURE — G8599 NO ASA/ANTIPLAT THER USE RNG: HCPCS | Performed by: FAMILY MEDICINE

## 2018-07-30 PROCEDURE — G8926 SPIRO NO PERF OR DOC: HCPCS | Performed by: FAMILY MEDICINE

## 2018-07-30 PROCEDURE — G8427 DOCREV CUR MEDS BY ELIG CLIN: HCPCS | Performed by: FAMILY MEDICINE

## 2018-07-30 PROCEDURE — 99214 OFFICE O/P EST MOD 30 MIN: CPT | Performed by: FAMILY MEDICINE

## 2018-07-30 PROCEDURE — 3023F SPIROM DOC REV: CPT | Performed by: FAMILY MEDICINE

## 2018-07-30 PROCEDURE — G8417 CALC BMI ABV UP PARAM F/U: HCPCS | Performed by: FAMILY MEDICINE

## 2018-07-30 NOTE — PROGRESS NOTES
Visit Information    Have you changed or started any medications since your last visit including any over-the-counter medicines, vitamins, or herbal medicines? no   Are you having any side effects from any of your medications? -  no  Have you stopped taking any of your medications? Is so, why? -  no    Have you seen any other physician or provider since your last visit? No  Have you had any other diagnostic tests since your last visit? Yes - Records Obtained  Have you been seen in the emergency room and/or had an admission to a hospital since we last saw you? No  Have you had your routine dental cleaning in the past 6 months? no    Have you activated your [facility] account? If not, what are your barriers? No     Patient Care Team:  Wing Somers DO as PCP - General (Family Medicine)  Guille Jones MD as Consulting Physician (Pulmonology)  Kaitlin Mata RN as Nurse Navigator    Medical History Review  Past Medical, Family, and Social History     Defer to provider.

## 2018-07-30 NOTE — PATIENT INSTRUCTIONS
liability for your use of this information. Patient Education        Stopping Smoking: Care Instructions  Your Care Instructions  Cigarette smokers crave the nicotine in cigarettes. Giving it up is much harder than simply changing a habit. Your body has to stop craving the nicotine. It is hard to quit, but you can do it. There are many tools that people use to quit smoking. You may find that combining tools works best for you. There are several steps to quitting. First you get ready to quit. Then you get support to help you. After that, you learn new skills and behaviors to become a nonsmoker. For many people, a necessary step is getting and using medicine. Your doctor will help you set up the plan that best meets your needs. You may want to attend a smoking cessation program to help you quit smoking. When you choose a program, look for one that has proven success. Ask your doctor for ideas. You will greatly increase your chances of success if you take medicine as well as get counseling or join a cessation program.  Some of the changes you feel when you first quit tobacco are uncomfortable. Your body will miss the nicotine at first, and you may feel short-tempered and grumpy. You may have trouble sleeping or concentrating. Medicine can help you deal with these symptoms. You may struggle with changing your smoking habits and rituals. The last step is the tricky one: Be prepared for the smoking urge to continue for a time. This is a lot to deal with, but keep at it. You will feel better. Follow-up care is a key part of your treatment and safety. Be sure to make and go to all appointments, and call your doctor if you are having problems. It's also a good idea to know your test results and keep a list of the medicines you take. How can you care for yourself at home? · Ask your family, friends, and coworkers for support. You have a better chance of quitting if you have help and support.   · Join a support

## 2018-07-30 NOTE — PROGRESS NOTES
Chief Complaint   Patient presents with    Follow-up     issues as noted below       History obtained from the patient. SUBJECTIVE:  Benji Morrissey is a 59 y.o. female that presents today for     -01. Chronic LOW Back Pain:    HPI LAST VISIT: Chronic issue; worse the last 3 to 4 months with radiation into the L hip with occ pain into the R hip. No prior treatment. Pain sharp and then become achey. No recent injury. Uses tylenol, which does help some. Never done PT. Inciting injury or history of trauma? No  Pain is relieved by - tylenol some  Pain is aggravated by - laying down, lifting, bending and twisting. Radiation of the pain? Yes - L hip  Paresthesias of the extremities? No  Saddle anesthesia? No  Bowel or bladder incontinence? No  Treatments tried - tylenol. UPDATE TODAY: back is doing better. Never did xray or PT. But otherwise doing ok, so declines either at this point.       -02. COPD:    HPI LAST VISIT: currently on just prn alb. Allergic to spiriva. Using alb a few times per day. Still smoking 1/2 to 1 ppd. Mild SOB, stable. Last PFT 2014    UPDATE TODAY: started on Salmeterol last visit. Had PFTS done. Breathing is improved with addition of the Salmetrol. Less SOB and wheezing. Down to 3 cig per day. On chantix. Current medication regimen - see below  Compliant with medications? yes    Limitations in function - mild to moderate  Does patient smoke? Yes - wants to quit    Chronic cough?: no  Chest pain/Tightness?:  no  Shortness of breath?: yes  Wheezing?  no    Last PFTs - June 2018  # Exacerbations in the last year: 2-3    Known triggers? Yes  Hospitalized and/or intubated in the past?: No  Number of times prescribed oral steroids in the past year - 2  Influenza vaccine up to date?  n/      -03. Pulm nodule: noted on CT lung screening. F/u ordered for SEPT, is scheduled. No cough or hemoptysis.       -04. HLD:    HPI: taking lipitor, but lipids high yet. On 80mg QHS.  Not missing doses    Taking meds as prescribed ?: yes  Tolerating well ?: yes  Side Effects ?: denies  Muscle Pain?: denies  Working on TLCS ?: yes      -05. AAA: stable, follows with [facility]. On secondary prevention. Has f/u scheduled      -06. Chronic pancreatitis: used to follow with Catia KOVACS. Not seen in a while. However, denies abd pain or diarrhea.     -07. Obesity: working on wt loss, asking if can see wt management. Has made some diet changes     -08. Smoking LAST VISIT: wants to quit, would like chantix    UPDATE TODAY: on chantix, working well. Down to 3 cig per day. No side effects.         Age/Gender Health Maintenance    Lipid -   Lab Results   Component Value Date    CHOL 238 (H) 06/21/2018    CHOL 242 (H) 05/16/2017    CHOL 186 04/20/2014     Lab Results   Component Value Date    TRIG 139 06/21/2018    TRIG 247 (H) 05/16/2017    TRIG 110 04/20/2014     Lab Results   Component Value Date    HDL 45 06/21/2018    HDL 35 05/16/2017    HDL 36 04/20/2014     Lab Results   Component Value Date    LDLCALC 165 06/21/2018    LDLCALC 158 05/16/2017    LDLCALC 128 04/20/2014     Lab Results   Component Value Date    VLDL 33 12/14/2011     No results found for: CHOLHDLRATIO      DM Screen -   Lab Results   Component Value Date    GLUCOSE 101 06/21/2018    GLUCOSE 88 07/14/2014       Colon Cancer Screening - + tubular adenoma July 2014; repeat 5 years per [address] (Age 52 to [de-identified] with 30 pack year hx, current smoker or quit within past 15 years) - + nodule June 2018; repeat 3 months, scheduled    Tetanus - discuss next visit  Influenza Vaccine - Candidate FALL 2018  Pneumonia Vaccine - UTD PPV 23 APR 2014  Zostavax - discuss next visit     Breast Cancer Screening - NEG APR 2017; due, rodered  Cervical Cancer Screening - discuss next visit  Osteoporosis Screening - age 70      Specialist List  Neuro: Yayo  GI: Jonathan Coins: [facility]  Cardio: [facility]  [facility]: [facility]      Current Outpatient [facility]      Obesity (BMI 30-39.9) 2014    Essential hypertension 2014    Chronic pancreatitis ([facility] 2014    AAA (abdominal aortic aneurysm) ([facility] 2014     Follows with Dr. Brie mauricio      COPD [facility]ch[facility] obstructive [facility])        Past Medical History:   Diagnosis Date    AAA (abdominal aortic aneurysm) ([facility] 2014    Follows with Dr. Brie mauricio    Chronic pancreatitis ([facility]     COPD (chronic obstructive pulmonary disease) ([facility]     Dyslipidemia     Essential hypertension 2014    GERD (gastroesophageal reflux disease)     History of stroke with residual deficit; cryptogenic 2017    Follows with neuro and cardio at [facility]    Nicotine dependence     Obesity (BMI 30-39. 9)     Osteoarthritis        Past Surgical History:   Procedure Laterality Date    ABDOMEN SURGERY      c sections x3    CARPAL TUNNEL RELEASE Bilateral      SECTION      x3    COLONOSCOPY  14    Dr. 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 Pain Score:   3 (bacj)    VS Reviewed  General Appearance: A&O x 3, No acute distress,well developed and well- nourished  Eyes: pupils equal, round, and reactive to light, extraocular eye movements intact, conjunctivae and eye lids without erythema  ENT: external ear and ear canal clear bilaterally, TMs intact and regular, nose without deformity, nasal mucosa and turbinates normal without polyps, oropharynx normal, dentition is normal for age  Neck: supple and non-tender without mass, no thyromegaly or thyroid nodules, no cervical lymphadenopathy  Pulmonary/Chest: distant but clear to auscultation bilaterally- no wheezes, rales or rhonchi, normal air movement, no respiratory distress or retractions  Cardiovascular: distant with S1 and S2 auscultated w/ RRR. No murmurs, rubs, clicks, or gallops, distal pulses intact. Abdomen: soft, non-tender, non-distended, bowel sounds physiologic,  no rebound or guarding, no masses or hernias noted. Liver and spleen without enlargement. Extremities: no cyanosis, clubbing or edema of the lower extremities. +2 PT/DP bilaterally. Musculoskeletal: No joint swelling or gross deformity   Skin: warm and dry, no rash or erythema      Narrative   NONCONTRAST SCREENING CT CHEST:       HISTORY: History of smoking. Approximately 34 pack year history of smoking.       TECHNIQUE:    1 mm axial imaging of the chest and upper abdomen without IV contrast.        All CT scans at this facility use dose modulation, iterative reconstruction, and/or weight based dosing when appropriate to reduce the radiation dose to as low as reasonably achievable.       COMPARISON: No prior examinations.           FINDINGS:   LUNGS NODULES:   1.  There is a 7 x 3 mm slightly lobulated pulmonary nodule demonstrated within the superior aspect of the left appointment. Barriers to learning and self management: none    Discussed use, benefit, and side effects of prescribed medications. Barriers to medication compliance addressed. All patient questions answered. Pt voiced understanding.        Electronically signed by Daniel Euceda DO on 7/30/2018 at 12:41 PM

## 2018-08-30 ENCOUNTER — TELEPHONE (OUTPATIENT)
Dept: CARDIOLOGY CLINIC | Age: 58
End: 2018-08-30

## 2018-09-04 ENCOUNTER — OFFICE VISIT (OUTPATIENT)
Dept: CARDIOLOGY CLINIC | Age: 58
End: 2018-09-04
Payer: MEDICARE

## 2018-09-04 VITALS
BODY MASS INDEX: 37.32 KG/M2 | HEART RATE: 89 BPM | WEIGHT: 224 LBS | DIASTOLIC BLOOD PRESSURE: 80 MMHG | SYSTOLIC BLOOD PRESSURE: 136 MMHG | HEIGHT: 65 IN

## 2018-09-04 DIAGNOSIS — I63.9 CRYPTOGENIC STROKE (HCC): Primary | ICD-10-CM

## 2018-09-04 DIAGNOSIS — R06.02 SHORTNESS OF BREATH: ICD-10-CM

## 2018-09-04 PROCEDURE — G8427 DOCREV CUR MEDS BY ELIG CLIN: HCPCS | Performed by: INTERNAL MEDICINE

## 2018-09-04 PROCEDURE — 99214 OFFICE O/P EST MOD 30 MIN: CPT | Performed by: INTERNAL MEDICINE

## 2018-09-04 PROCEDURE — G8417 CALC BMI ABV UP PARAM F/U: HCPCS | Performed by: INTERNAL MEDICINE

## 2018-09-04 PROCEDURE — G8599 NO ASA/ANTIPLAT THER USE RNG: HCPCS | Performed by: INTERNAL MEDICINE

## 2018-09-04 PROCEDURE — 93000 ELECTROCARDIOGRAM COMPLETE: CPT | Performed by: INTERNAL MEDICINE

## 2018-09-04 PROCEDURE — 4004F PT TOBACCO SCREEN RCVD TLK: CPT | Performed by: INTERNAL MEDICINE

## 2018-09-04 PROCEDURE — 3017F COLORECTAL CA SCREEN DOC REV: CPT | Performed by: INTERNAL MEDICINE

## 2018-09-04 RX ORDER — CLOPIDOGREL BISULFATE 75 MG/1
75 TABLET ORAL DAILY
Qty: 30 TABLET | Refills: 3 | Status: CANCELLED | OUTPATIENT
Start: 2018-09-04

## 2018-09-04 RX ORDER — CLOPIDOGREL BISULFATE 75 MG/1
75 TABLET ORAL DAILY
Qty: 30 TABLET | Refills: 3 | Status: SHIPPED | OUTPATIENT
Start: 2018-09-04 | End: 2019-02-26 | Stop reason: SDUPTHER

## 2018-09-04 RX ORDER — MELOXICAM 15 MG/1
15 TABLET ORAL DAILY
COMMUNITY
End: 2019-10-24 | Stop reason: SDUPTHER

## 2018-09-04 ASSESSMENT — ENCOUNTER SYMPTOMS
LEFT EYE: 0
CONSTIPATION: 0
ABDOMINAL PAIN: 0
WHEEZING: 0
DOUBLE VISION: 0
NAUSEA: 0
SHORTNESS OF BREATH: 0
BACK PAIN: 0
BLURRED VISION: 0
SORE THROAT: 0
VOMITING: 0
DIARRHEA: 0
RIGHT EYE: 0
COUGH: 0

## 2018-09-04 NOTE — PROGRESS NOTES
CARDIOLOGY - ELECTROPHYSIOLOGY  PROGRESS NOTE    Date:   9/4/2018  Patient name: Teddy Rolon  Date of admission:  No admission date for patient encounter. MRN:   [id number]  YOB: 1960  PCP: Grabiel Samano DO    Subjective:  I am trying to lose weight but I get short of breath anytime I exercise. Clinical Changes / Abnormalities:    05/31/2017:  HPI  The patient was here apparently with pancreatitis last week, Friday, and had nausea and vomiting. Upon being discharged, she  was given some paperwork and she actually dropped them without any feeling that she dropped them. Then when she came in this time on Monday, she had  numbness. She could not feel her hands on the left side. In addition to that, she actually had facial drooping according to her daughter. This  allowed the patient to come to the hospital to be evaluated. Subsequently, laboratory tests were performed. Lipid profile: Total cholesterol 242,  triglycerides 247, HDL 35 and LDL was 158. Fibrinogen was 280. INR was 0.98. The hemoglobin A1c was 5.6. The patient had imaging studies performed. CAT  scan of the neck was performed and no acute process was noted. Probably bilateral carotid stenosis resulting in less than 50% narrowing. The patient  also had a MRI of the brain performed. It is suggestive of subacute infarcts were noted, possible embolic phenomena and this is the basis for cardiology  being consulted. Echocardiogram was performed on this patient and it showed preserved ejection fraction, but it could not exclude the possibility of  embolic phenomena, so PFO was suggested on that study. 09/04/2018: The patient is s/o cryptogenic stoke. I inserted an ILR last year. The patient has not had any documented episodes of atrial fibrillation. She has hdad two pauses documented but the patient was asymptomatic. She reports trying to lose weight but becomes short of breath anytime she exerts herself.   She denies She is s/p insertion of an ILR in 2017. She has not had any documented episodes of PAF. 2. Dyspnea on exertion: The patient reports having dyspnea on exertion. She does smoke one cigarette a day and has been diagnosed with moderate COPD. She has not had a stress test and I discussed the possibility of occult ischemia. I recommended obtaining a Lexiscan stress test for further evaluation. Patient Active Problem List:     COPD (chronic obstructive pulmonary disease) ([facility]     Obesity (BMI 30-39. 9)     Essential hypertension     Chronic pancreatitis (HCC)     PFO (patent foramen ovale)     Dyslipidemia     AAA (abdominal aortic aneurysm) (HCC)     GERD (gastroesophageal reflux disease)     History of stroke with residual deficit; cryptogenic     Nicotine dependence     Osteoarthritis     Chronic bilateral low back pain with left-sided sciatica      Plan of Treatment:   1. Obtain pharmacologic stress test with nuclear imaging. 2. Continue remote monitoring of her ILR.   3. Follow up with Cardiology based upon the results of the stress test.

## 2018-09-06 ENCOUNTER — HOSPITAL ENCOUNTER (OUTPATIENT)
Dept: NON INVASIVE DIAGNOSTICS | Age: 58
Discharge: HOME | End: 2018-09-06
Payer: MEDICARE

## 2018-09-06 ENCOUNTER — TELEPHONE (OUTPATIENT)
Dept: CARDIOLOGY CLINIC | Age: 58
End: 2018-09-06

## 2018-09-06 DIAGNOSIS — R06.02 SHORTNESS OF BREATH: ICD-10-CM

## 2018-09-06 PROCEDURE — A9500 TC99M SESTAMIBI: HCPCS | Performed by: INTERNAL MEDICINE

## 2018-09-06 PROCEDURE — 3430000000 HC RX DIAGNOSTIC RADIOPHARMACEUTICAL: Performed by: INTERNAL MEDICINE

## 2018-09-06 PROCEDURE — 2709999900 HC NON-CHARGEABLE SUPPLY

## 2018-09-06 PROCEDURE — 6360000002 HC RX W HCPCS

## 2018-09-06 PROCEDURE — 93017 CV STRESS TEST TRACING ONLY: CPT | Performed by: NUCLEAR MEDICINE

## 2018-09-06 PROCEDURE — 78452 HT MUSCLE IMAGE SPECT MULT: CPT

## 2018-09-06 RX ADMIN — Medication 33 MILLICURIE: at 10:45

## 2018-09-06 RX ADMIN — Medication 9.7 MILLICURIE: at 09:42

## 2018-09-12 ENCOUNTER — TELEPHONE (OUTPATIENT)
Dept: CARDIOLOGY CLINIC | Age: 58
End: 2018-09-12

## 2018-09-12 NOTE — TELEPHONE ENCOUNTER
Dr. Lucina Christy,   You have reviewed the Eneida resutls with patient, does she need a f/u  Apt in office?

## 2018-10-24 ENCOUNTER — TELEPHONE (OUTPATIENT)
Dept: FAMILY MEDICINE CLINIC | Age: 58
End: 2018-10-24

## 2018-10-24 NOTE — TELEPHONE ENCOUNTER
1st attempt to contact the pt re:overdue labs that Dr Isidoro Mena ordered on 7/30/18. No answer, no VM so no msg could be left.

## 2018-10-30 ENCOUNTER — PROCEDURE VISIT (OUTPATIENT)
Dept: CARDIOLOGY CLINIC | Age: 58
End: 2018-10-30
Payer: MEDICARE

## 2018-10-30 DIAGNOSIS — I63.9 CRYPTOGENIC STROKE (HCC): Primary | ICD-10-CM

## 2018-10-30 PROCEDURE — 93299 PR REM INTERROG ICPMS/SCRMS <30 D TECH REVIEW: CPT | Performed by: INTERNAL MEDICINE

## 2018-10-30 PROCEDURE — 93298 REM INTERROG DEV EVAL SCRMS: CPT | Performed by: INTERNAL MEDICINE

## 2018-11-18 ENCOUNTER — HOSPITAL ENCOUNTER (EMERGENCY)
Age: 58
Discharge: HOME | End: 2018-11-18
Attending: FAMILY MEDICINE
Payer: MEDICARE

## 2018-11-18 ENCOUNTER — APPOINTMENT (OUTPATIENT)
Dept: CT IMAGING | Age: 58
End: 2018-11-18
Payer: MEDICARE

## 2018-11-18 VITALS
SYSTOLIC BLOOD PRESSURE: 122 MMHG | DIASTOLIC BLOOD PRESSURE: 76 MMHG | BODY MASS INDEX: 39.32 KG/M2 | HEIGHT: 65 IN | RESPIRATION RATE: 16 BRPM | OXYGEN SATURATION: 95 % | TEMPERATURE: 97.8 F | HEART RATE: 76 BPM | WEIGHT: 236 LBS

## 2018-11-18 DIAGNOSIS — R10.13 ABDOMINAL PAIN, EPIGASTRIC: ICD-10-CM

## 2018-11-18 DIAGNOSIS — R11.2 NON-INTRACTABLE VOMITING WITH NAUSEA, UNSPECIFIED VOMITING TYPE: Primary | ICD-10-CM

## 2018-11-18 DIAGNOSIS — F12.90 MARIJUANA USE: ICD-10-CM

## 2018-11-18 LAB
ALBUMIN SERPL-MCNC: 4.2 G/DL (ref 3.5–5.1)
ALP BLD-CCNC: 119 U/L (ref 38–126)
ALT SERPL-CCNC: 16 U/L (ref 11–66)
AMPHETAMINE+METHAMPHETAMINE URINE SCREEN: NEGATIVE
ANION GAP SERPL CALCULATED.3IONS-SCNC: 14 MEQ/L (ref 8–16)
AST SERPL-CCNC: 19 U/L (ref 5–40)
BARBITURATE QUANTITATIVE URINE: NEGATIVE
BASOPHILS # BLD: 0.5 %
BASOPHILS ABSOLUTE: 0.1 THOU/MM3 (ref 0–0.1)
BENZODIAZEPINE QUANTITATIVE URINE: NEGATIVE
BILIRUB SERPL-MCNC: 0.4 MG/DL (ref 0.3–1.2)
BILIRUBIN DIRECT: < 0.2 MG/DL (ref 0–0.3)
BILIRUBIN URINE: NEGATIVE
BLOOD, URINE: NEGATIVE
BUN BLDV-MCNC: 10 MG/DL (ref 7–22)
CALCIUM SERPL-MCNC: 9.2 MG/DL (ref 8.5–10.5)
CANNABINOID QUANTITATIVE URINE: POSITIVE
CHARACTER, URINE: CLEAR
CHLORIDE BLD-SCNC: 103 MEQ/L (ref 98–111)
CO2: 21 MEQ/L (ref 23–33)
COCAINE METABOLITE QUANTITATIVE URINE: NEGATIVE
COLOR: YELLOW
CREAT SERPL-MCNC: 0.6 MG/DL (ref 0.4–1.2)
EKG ATRIAL RATE: 69 BPM
EKG P AXIS: 27 DEGREES
EKG P-R INTERVAL: 138 MS
EKG Q-T INTERVAL: 420 MS
EKG QRS DURATION: 94 MS
EKG QTC CALCULATION (BAZETT): 450 MS
EKG R AXIS: 49 DEGREES
EKG T AXIS: 89 DEGREES
EKG VENTRICULAR RATE: 69 BPM
EOSINOPHIL # BLD: 0.8 %
EOSINOPHILS ABSOLUTE: 0.1 THOU/MM3 (ref 0–0.4)
ERYTHROCYTE [DISTWIDTH] IN BLOOD BY AUTOMATED COUNT: 13.9 % (ref 11.5–14.5)
ERYTHROCYTE [DISTWIDTH] IN BLOOD BY AUTOMATED COUNT: 47.6 FL (ref 35–45)
GFR SERPL CREATININE-BSD FRML MDRD: > 90 ML/MIN/1.73M2
GLUCOSE BLD-MCNC: 147 MG/DL (ref 70–108)
GLUCOSE URINE: NEGATIVE MG/DL
HCT VFR BLD CALC: 47.9 % (ref 37–47)
HEMOGLOBIN: 15.5 GM/DL (ref 12–16)
IMMATURE GRANS (ABS): 0.05 THOU/MM3 (ref 0–0.07)
IMMATURE GRANULOCYTES: 0.5 %
KETONES, URINE: ABNORMAL
LEUKOCYTE ESTERASE, URINE: NEGATIVE
LIPASE: 34.6 U/L (ref 5.6–51.3)
LYMPHOCYTES # BLD: 27.8 %
LYMPHOCYTES ABSOLUTE: 2.9 THOU/MM3 (ref 1–4.8)
MCH RBC QN AUTO: 30.1 PG (ref 26–33)
MCHC RBC AUTO-ENTMCNC: 32.4 GM/DL (ref 32.2–35.5)
MCV RBC AUTO: 93 FL (ref 81–99)
MONOCYTES # BLD: 7.6 %
MONOCYTES ABSOLUTE: 0.8 THOU/MM3 (ref 0.4–1.3)
NITRITE, URINE: NEGATIVE
NUCLEATED RED BLOOD CELLS: 0 /100 WBC
OPIATES, URINE: NEGATIVE
OSMOLALITY CALCULATION: 277.4 MOSMOL/KG (ref 275–300)
OXYCODONE: NEGATIVE
PH UA: 6.5
PHENCYCLIDINE QUANTITATIVE URINE: NEGATIVE
PLATELET # BLD: 237 THOU/MM3 (ref 130–400)
PMV BLD AUTO: 11.3 FL (ref 9.4–12.4)
POTASSIUM SERPL-SCNC: 4 MEQ/L (ref 3.5–5.2)
PROTEIN UA: NEGATIVE
RBC # BLD: 5.15 MILL/MM3 (ref 4.2–5.4)
SEG NEUTROPHILS: 62.8 %
SEGMENTED NEUTROPHILS ABSOLUTE COUNT: 6.5 THOU/MM3 (ref 1.8–7.7)
SODIUM BLD-SCNC: 138 MEQ/L (ref 135–145)
SPECIFIC GRAVITY, URINE: > 1.03 (ref 1–1.03)
TOTAL PROTEIN: 6.6 G/DL (ref 6.1–8)
TROPONIN T: < 0.01 NG/ML
TROPONIN T: < 0.01 NG/ML
UROBILINOGEN, URINE: 1 EU/DL
WBC # BLD: 10.3 THOU/MM3 (ref 4.8–10.8)

## 2018-11-18 PROCEDURE — 93005 ELECTROCARDIOGRAM TRACING: CPT | Performed by: FAMILY MEDICINE

## 2018-11-18 PROCEDURE — 82248 BILIRUBIN DIRECT: CPT

## 2018-11-18 PROCEDURE — 36415 COLL VENOUS BLD VENIPUNCTURE: CPT

## 2018-11-18 PROCEDURE — 80307 DRUG TEST PRSMV CHEM ANLYZR: CPT

## 2018-11-18 PROCEDURE — 85025 COMPLETE CBC W/AUTO DIFF WBC: CPT

## 2018-11-18 PROCEDURE — 83690 ASSAY OF LIPASE: CPT

## 2018-11-18 PROCEDURE — 6360000004 HC RX CONTRAST MEDICATION: Performed by: FAMILY MEDICINE

## 2018-11-18 PROCEDURE — 6360000002 HC RX W HCPCS: Performed by: FAMILY MEDICINE

## 2018-11-18 PROCEDURE — 93010 ELECTROCARDIOGRAM REPORT: CPT | Performed by: INTERNAL MEDICINE

## 2018-11-18 PROCEDURE — 84484 ASSAY OF TROPONIN QUANT: CPT

## 2018-11-18 PROCEDURE — 99284 EMERGENCY DEPT VISIT MOD MDM: CPT

## 2018-11-18 PROCEDURE — 96375 TX/PRO/DX INJ NEW DRUG ADDON: CPT

## 2018-11-18 PROCEDURE — 80053 COMPREHEN METABOLIC PANEL: CPT

## 2018-11-18 PROCEDURE — 96374 THER/PROPH/DIAG INJ IV PUSH: CPT

## 2018-11-18 PROCEDURE — 81003 URINALYSIS AUTO W/O SCOPE: CPT

## 2018-11-18 PROCEDURE — 74174 CTA ABD&PLVS W/CONTRAST: CPT

## 2018-11-18 RX ORDER — SUCRALFATE 1 G/1
1 TABLET ORAL 4 TIMES DAILY
Qty: 60 TABLET | Refills: 0 | Status: SHIPPED | OUTPATIENT
Start: 2018-11-18 | End: 2019-03-21 | Stop reason: ALTCHOICE

## 2018-11-18 RX ORDER — FAMOTIDINE 20 MG/1
20 TABLET, FILM COATED ORAL 2 TIMES DAILY
Qty: 30 TABLET | Refills: 0 | Status: SHIPPED | OUTPATIENT
Start: 2018-11-18 | End: 2020-03-25 | Stop reason: SDUPTHER

## 2018-11-18 RX ORDER — ONDANSETRON 4 MG/1
4 TABLET, ORALLY DISINTEGRATING ORAL EVERY 8 HOURS PRN
Qty: 6 TABLET | Refills: 0 | Status: SHIPPED | OUTPATIENT
Start: 2018-11-18 | End: 2019-03-21 | Stop reason: ALTCHOICE

## 2018-11-18 RX ORDER — FENTANYL CITRATE 50 UG/ML
50 INJECTION, SOLUTION INTRAMUSCULAR; INTRAVENOUS ONCE
Status: COMPLETED | OUTPATIENT
Start: 2018-11-18 | End: 2018-11-18

## 2018-11-18 RX ORDER — ONDANSETRON 2 MG/ML
4 INJECTION INTRAMUSCULAR; INTRAVENOUS ONCE
Status: COMPLETED | OUTPATIENT
Start: 2018-11-18 | End: 2018-11-18

## 2018-11-18 RX ADMIN — FENTANYL CITRATE 50 MCG: 50 INJECTION INTRAMUSCULAR; INTRAVENOUS at 18:12

## 2018-11-18 RX ADMIN — IOPAMIDOL 80 ML: 755 INJECTION, SOLUTION INTRAVENOUS at 19:14

## 2018-11-18 RX ADMIN — ONDANSETRON 4 MG: 2 INJECTION INTRAMUSCULAR; INTRAVENOUS at 18:12

## 2018-11-18 ASSESSMENT — ENCOUNTER SYMPTOMS
NAUSEA: 1
DIARRHEA: 0
ABDOMINAL PAIN: 1
SHORTNESS OF BREATH: 0
SORE THROAT: 0
COUGH: 0
BACK PAIN: 0
EYE DISCHARGE: 0
EYE PAIN: 0
VOMITING: 1
WHEEZING: 0
RHINORRHEA: 0

## 2018-11-18 ASSESSMENT — PAIN DESCRIPTION - DESCRIPTORS
DESCRIPTORS: CONSTANT;CRAMPING
DESCRIPTORS: CONSTANT;CRAMPING

## 2018-11-18 ASSESSMENT — PAIN SCALES - GENERAL
PAINLEVEL_OUTOF10: 10
PAINLEVEL_OUTOF10: 2
PAINLEVEL_OUTOF10: 10

## 2018-11-18 ASSESSMENT — PAIN DESCRIPTION - FREQUENCY
FREQUENCY: CONTINUOUS
FREQUENCY: CONTINUOUS

## 2018-11-18 ASSESSMENT — PAIN DESCRIPTION - PAIN TYPE
TYPE: ACUTE PAIN
TYPE: ACUTE PAIN

## 2018-11-18 ASSESSMENT — PAIN DESCRIPTION - LOCATION
LOCATION: ABDOMEN
LOCATION: ABDOMEN

## 2018-11-18 NOTE — ED NOTES
Patient at this time vomiting and reporting pain in abdominal region. Patient recently medicated per order.      Kira Parham RN  11/18/18 [id number]

## 2018-11-18 NOTE — ED PROVIDER NOTES
Collection Time Result Time QTc Calculation (Bazett) P Axis R Axis T Axis   11/18/18 17:59:16 11/18/18 18:01:36 450 27 49 89       Preliminary result                Narrative:    Normal sinus rhythm  Anterolateral infarct (cited on or before 12-MAY-2017)  Abnormal ECG  When compared with ECG of 15-MAR-2018 20:03,  QRS duration has decreased  Questionable change in initial forces of Lateral leads  Non-specific change in ST segment in Anterior leads  T wave inversion more evident in Anterolateral leads                  RADIOLOGY: non-plain film images(s) such as CT, Ultrasound and MRI are read by the radiologist.    [address]   Final Result   1. Stable aneurysm of the infrarenal abdominal aorta. 2. No acute intra-abdominal or intrapelvic findings. Final report electronically signed by Dr. Bee Santacruz on 11/18/2018 7:30 PM           LABS:   Labs Reviewed   CBC WITH AUTO DIFFERENTIAL - Abnormal; Notable for the following:        Result Value    Hematocrit 47.9 (*)     RDW-SD 47.6 (*)     All other components within normal limits   BASIC METABOLIC PANEL - Abnormal; Notable for the following:     CO2 21 (*)     Glucose 147 (*)     All other components within normal limits   URINE RT REFLEX TO CULTURE - Abnormal; Notable for the following:     Ketones, Urine TRACE (*)     Specific Gravity, Urine > 1.030 (*)     All other components within normal limits   HEPATIC FUNCTION PANEL   LIPASE   URINE DRUG SCREEN   TROPONIN   GLOMERULAR FILTRATION RATE, ESTIMATED   OSMOLALITY   ANION GAP   TROPONIN       EMERGENCY DEPARTMENT COURSE:   Vitals:    Vitals:    11/18/18 1754 11/18/18 1937   BP: (!) 146/73 122/76   Pulse: 69 76   Resp: 16 16   Temp: 97.8 °F (36.6 °C)    TempSrc: Oral    SpO2: 94% 95%   Weight: 236 lb (107 kg)    Height: 5' 5\" (1.651 m)        5:59 PM: The patient was seen and evaluated.      MDM:  Patient was seen and evaluated by me at bedside generalized abdominal pain and

## 2018-11-18 NOTE — ED TRIAGE NOTES
Patient presents to ER with complaints of generalized abdominal pain, nausea, and vomiting that began last night at approximately 2000.

## 2018-11-29 ENCOUNTER — TELEPHONE (OUTPATIENT)
Dept: FAMILY MEDICINE CLINIC | Age: 58
End: 2018-11-29

## 2018-12-07 ENCOUNTER — PROCEDURE VISIT (OUTPATIENT)
Dept: CARDIOLOGY CLINIC | Age: 58
End: 2018-12-07
Payer: MEDICARE

## 2018-12-07 DIAGNOSIS — I63.9 CRYPTOGENIC STROKE (HCC): Primary | ICD-10-CM

## 2018-12-07 PROCEDURE — 93298 REM INTERROG DEV EVAL SCRMS: CPT | Performed by: NUCLEAR MEDICINE

## 2018-12-10 ENCOUNTER — TELEPHONE (OUTPATIENT)
Dept: FAMILY MEDICINE CLINIC | Age: 58
End: 2018-12-10

## 2018-12-10 DIAGNOSIS — F17.210 CIGARETTE NICOTINE DEPENDENCE WITHOUT COMPLICATION: Primary | ICD-10-CM

## 2018-12-10 RX ORDER — VARENICLINE TARTRATE 1 MG/1
1 TABLET, FILM COATED ORAL 2 TIMES DAILY
Qty: 60 TABLET | Refills: 1 | Status: SHIPPED | OUTPATIENT
Start: 2018-12-10 | End: 2019-01-07

## 2018-12-10 RX ORDER — VARENICLINE TARTRATE 25 MG
KIT ORAL
Qty: 1 EACH | Refills: 0 | Status: SHIPPED | OUTPATIENT
Start: 2018-12-10 | End: 2019-01-07

## 2018-12-10 NOTE — TELEPHONE ENCOUNTER
F/u if unable to quit    ASSESSMENT & PLAN  1. Cigarette nicotine dependence without complication    - varenicline (CHANTIX CONTINUING MONTH PAK) 1 MG tablet; Take 1 tablet by mouth 2 times daily  Dispense: 60 tablet; Refill: 1  - varenicline (CHANTIX STARTING MONTH PAK) 0.5 MG X 11 & 1 MG X 42 tablet; Take by mouth as directed on box  Dispense: 1 each;  Refill: 0

## 2018-12-27 ENCOUNTER — HOSPITAL ENCOUNTER (EMERGENCY)
Age: 58
Discharge: HOME | End: 2018-12-27
Payer: MEDICARE

## 2018-12-27 VITALS
HEART RATE: 82 BPM | SYSTOLIC BLOOD PRESSURE: 129 MMHG | WEIGHT: 216 LBS | DIASTOLIC BLOOD PRESSURE: 68 MMHG | RESPIRATION RATE: 20 BRPM | OXYGEN SATURATION: 95 % | TEMPERATURE: 98.1 F | BODY MASS INDEX: 35.94 KG/M2

## 2018-12-27 DIAGNOSIS — B37.31 VAGINAL YEAST INFECTION: ICD-10-CM

## 2018-12-27 DIAGNOSIS — N30.01 ACUTE CYSTITIS WITH HEMATURIA: Primary | ICD-10-CM

## 2018-12-27 LAB
BILIRUBIN URINE: NEGATIVE
BLOOD, URINE: ABNORMAL
CHARACTER, URINE: ABNORMAL
COLOR: ABNORMAL
GLUCOSE, URINE: NEGATIVE MG/DL
KETONES, URINE: ABNORMAL
LEUKOCYTES, UA: ABNORMAL
NITRATE, UA: NEGATIVE
PH UA: 6 (ref 5–9)
PROTEIN UA: NEGATIVE MG/DL
REFLEX TO URINE C & S: ABNORMAL
SPECIFIC GRAVITY UA: 1.02 (ref 1–1.03)
UROBILINOGEN, URINE: 0.2 EU/DL (ref 0–1)

## 2018-12-27 PROCEDURE — 87086 URINE CULTURE/COLONY COUNT: CPT

## 2018-12-27 PROCEDURE — 99213 OFFICE O/P EST LOW 20 MIN: CPT

## 2018-12-27 PROCEDURE — 81003 URINALYSIS AUTO W/O SCOPE: CPT

## 2018-12-27 PROCEDURE — 99213 OFFICE O/P EST LOW 20 MIN: CPT | Performed by: NURSE PRACTITIONER

## 2018-12-27 RX ORDER — METRONIDAZOLE 500 MG/1
500 TABLET ORAL 3 TIMES DAILY
Qty: 15 TABLET | Refills: 0 | Status: SHIPPED | OUTPATIENT
Start: 2018-12-27 | End: 2018-12-28 | Stop reason: SINTOL

## 2018-12-27 RX ORDER — NITROFURANTOIN 25; 75 MG/1; MG/1
100 CAPSULE ORAL 2 TIMES DAILY
Qty: 14 CAPSULE | Refills: 0 | Status: SHIPPED | OUTPATIENT
Start: 2018-12-27 | End: 2018-12-28 | Stop reason: SINTOL

## 2018-12-27 ASSESSMENT — ENCOUNTER SYMPTOMS
DIARRHEA: 0
SINUS PRESSURE: 0
VOMITING: 0
ABDOMINAL PAIN: 0
BACK PAIN: 0
RHINORRHEA: 0
PHOTOPHOBIA: 0
APNEA: 0
SHORTNESS OF BREATH: 0
CONSTIPATION: 0
NAUSEA: 0
SORE THROAT: 0
COUGH: 0

## 2018-12-28 ENCOUNTER — TELEPHONE (OUTPATIENT)
Dept: FAMILY MEDICINE CLINIC | Age: 58
End: 2018-12-28

## 2018-12-28 RX ORDER — CIPROFLOXACIN 500 MG/1
500 TABLET, FILM COATED ORAL 2 TIMES DAILY
Qty: 14 TABLET | Refills: 0 | Status: SHIPPED | OUTPATIENT
Start: 2018-12-28 | End: 2019-01-04

## 2018-12-29 LAB — URINE CULTURE REFLEX: NORMAL

## 2019-01-07 ENCOUNTER — OFFICE VISIT (OUTPATIENT)
Dept: FAMILY MEDICINE CLINIC | Age: 59
End: 2019-01-07
Payer: MEDICARE

## 2019-01-07 VITALS
TEMPERATURE: 98.5 F | DIASTOLIC BLOOD PRESSURE: 82 MMHG | HEIGHT: 65 IN | SYSTOLIC BLOOD PRESSURE: 130 MMHG | WEIGHT: 216 LBS | HEART RATE: 88 BPM | RESPIRATION RATE: 16 BRPM | BODY MASS INDEX: 35.99 KG/M2

## 2019-01-07 DIAGNOSIS — Z12.4 SCREENING FOR CERVICAL CANCER: ICD-10-CM

## 2019-01-07 DIAGNOSIS — R11.0 NAUSEA: Primary | ICD-10-CM

## 2019-01-07 DIAGNOSIS — Z11.59 NEED FOR HEPATITIS C SCREENING TEST: ICD-10-CM

## 2019-01-07 DIAGNOSIS — J44.9 CHRONIC OBSTRUCTIVE PULMONARY DISEASE, UNSPECIFIED COPD TYPE (HCC): ICD-10-CM

## 2019-01-07 DIAGNOSIS — K86.1 CHRONIC PANCREATITIS, UNSPECIFIED PANCREATITIS TYPE (HCC): ICD-10-CM

## 2019-01-07 DIAGNOSIS — R91.1 PULMONARY NODULE: ICD-10-CM

## 2019-01-07 DIAGNOSIS — E78.5 DYSLIPIDEMIA: ICD-10-CM

## 2019-01-07 DIAGNOSIS — F17.211 CIGARETTE NICOTINE DEPENDENCE IN REMISSION: ICD-10-CM

## 2019-01-07 DIAGNOSIS — E66.9 OBESITY (BMI 30-39.9): ICD-10-CM

## 2019-01-07 DIAGNOSIS — I71.40 ABDOMINAL AORTIC ANEURYSM (AAA) WITHOUT RUPTURE: ICD-10-CM

## 2019-01-07 LAB
BILIRUBIN, POC: NORMAL
BLOOD URINE, POC: NORMAL
CLARITY, POC: CLEAR
COLOR, POC: YELLOW
GLUCOSE URINE, POC: NORMAL
KETONES, POC: NORMAL
LEUKOCYTE EST, POC: NORMAL
NITRITE, POC: NORMAL
PH, POC: 5.5
PROTEIN, POC: NORMAL
SPECIFIC GRAVITY, POC: 1.02
UROBILINOGEN, POC: 0.2

## 2019-01-07 PROCEDURE — 3023F SPIROM DOC REV: CPT | Performed by: FAMILY MEDICINE

## 2019-01-07 PROCEDURE — G8484 FLU IMMUNIZE NO ADMIN: HCPCS | Performed by: FAMILY MEDICINE

## 2019-01-07 PROCEDURE — G8427 DOCREV CUR MEDS BY ELIG CLIN: HCPCS | Performed by: FAMILY MEDICINE

## 2019-01-07 PROCEDURE — 3017F COLORECTAL CA SCREEN DOC REV: CPT | Performed by: FAMILY MEDICINE

## 2019-01-07 PROCEDURE — 99214 OFFICE O/P EST MOD 30 MIN: CPT | Performed by: FAMILY MEDICINE

## 2019-01-07 PROCEDURE — G8417 CALC BMI ABV UP PARAM F/U: HCPCS | Performed by: FAMILY MEDICINE

## 2019-01-07 PROCEDURE — 1036F TOBACCO NON-USER: CPT | Performed by: FAMILY MEDICINE

## 2019-01-07 PROCEDURE — G8926 SPIRO NO PERF OR DOC: HCPCS | Performed by: FAMILY MEDICINE

## 2019-01-07 PROCEDURE — G8598 ASA/ANTIPLAT THER USED: HCPCS | Performed by: FAMILY MEDICINE

## 2019-01-07 PROCEDURE — 81002 URINALYSIS NONAUTO W/O SCOPE: CPT | Performed by: FAMILY MEDICINE

## 2019-01-09 ENCOUNTER — HOSPITAL ENCOUNTER (OUTPATIENT)
Dept: CT IMAGING | Age: 59
Discharge: HOME | End: 2019-01-09
Payer: MEDICARE

## 2019-01-09 ENCOUNTER — HOSPITAL ENCOUNTER (OUTPATIENT)
Dept: WOMENS IMAGING | Age: 59
Discharge: HOME | End: 2019-01-09
Payer: MEDICARE

## 2019-01-09 ENCOUNTER — HOSPITAL ENCOUNTER (OUTPATIENT)
Age: 59
Discharge: HOME | End: 2019-01-09
Payer: MEDICARE

## 2019-01-09 DIAGNOSIS — Z12.31 ENCOUNTER FOR SCREENING MAMMOGRAM FOR MALIGNANT NEOPLASM OF BREAST: ICD-10-CM

## 2019-01-09 DIAGNOSIS — R11.0 NAUSEA: ICD-10-CM

## 2019-01-09 DIAGNOSIS — E78.5 DYSLIPIDEMIA: ICD-10-CM

## 2019-01-09 DIAGNOSIS — R91.1 PULMONARY NODULE: ICD-10-CM

## 2019-01-09 DIAGNOSIS — R73.9 HYPERGLYCEMIA: Primary | ICD-10-CM

## 2019-01-09 DIAGNOSIS — Z11.59 NEED FOR HEPATITIS C SCREENING TEST: ICD-10-CM

## 2019-01-09 LAB
ALBUMIN SERPL-MCNC: 4.1 G/DL (ref 3.5–5.1)
ALP BLD-CCNC: 119 U/L (ref 38–126)
ALT SERPL-CCNC: 13 U/L (ref 11–66)
ANION GAP SERPL CALCULATED.3IONS-SCNC: 13 MEQ/L (ref 8–16)
AST SERPL-CCNC: 13 U/L (ref 5–40)
BASOPHILS # BLD: 0.4 %
BASOPHILS ABSOLUTE: 0 THOU/MM3 (ref 0–0.1)
BILIRUB SERPL-MCNC: 0.5 MG/DL (ref 0.3–1.2)
BUN BLDV-MCNC: 13 MG/DL (ref 7–22)
CALCIUM SERPL-MCNC: 9.5 MG/DL (ref 8.5–10.5)
CHLORIDE BLD-SCNC: 106 MEQ/L (ref 98–111)
CHOLESTEROL, TOTAL: 172 MG/DL (ref 100–199)
CO2: 26 MEQ/L (ref 23–33)
CREAT SERPL-MCNC: 0.8 MG/DL (ref 0.4–1.2)
EOSINOPHIL # BLD: 1.7 %
EOSINOPHILS ABSOLUTE: 0.1 THOU/MM3 (ref 0–0.4)
ERYTHROCYTE [DISTWIDTH] IN BLOOD BY AUTOMATED COUNT: 13.7 % (ref 11.5–14.5)
ERYTHROCYTE [DISTWIDTH] IN BLOOD BY AUTOMATED COUNT: 47.1 FL (ref 35–45)
GFR SERPL CREATININE-BSD FRML MDRD: 73 ML/MIN/1.73M2
GLUCOSE BLD-MCNC: 118 MG/DL (ref 70–108)
HCT VFR BLD CALC: 45.1 % (ref 37–47)
HDLC SERPL-MCNC: 47 MG/DL
HEMOGLOBIN: 14.5 GM/DL (ref 12–16)
HEPATITIS C ANTIBODY: NEGATIVE
IMMATURE GRANS (ABS): 0.02 THOU/MM3 (ref 0–0.07)
IMMATURE GRANULOCYTES: 0.3 %
LDL CHOLESTEROL CALCULATED: 95 MG/DL
LYMPHOCYTES # BLD: 29.1 %
LYMPHOCYTES ABSOLUTE: 2.3 THOU/MM3 (ref 1–4.8)
MCH RBC QN AUTO: 30.2 PG (ref 26–33)
MCHC RBC AUTO-ENTMCNC: 32.2 GM/DL (ref 32.2–35.5)
MCV RBC AUTO: 94 FL (ref 81–99)
MONOCYTES # BLD: 6.8 %
MONOCYTES ABSOLUTE: 0.5 THOU/MM3 (ref 0.4–1.3)
NUCLEATED RED BLOOD CELLS: 0 /100 WBC
PLATELET # BLD: 268 THOU/MM3 (ref 130–400)
PMV BLD AUTO: 10.5 FL (ref 9.4–12.4)
POTASSIUM SERPL-SCNC: 4 MEQ/L (ref 3.5–5.2)
RBC # BLD: 4.8 MILL/MM3 (ref 4.2–5.4)
SEG NEUTROPHILS: 61.7 %
SEGMENTED NEUTROPHILS ABSOLUTE COUNT: 4.8 THOU/MM3 (ref 1.8–7.7)
SODIUM BLD-SCNC: 145 MEQ/L (ref 135–145)
TOTAL PROTEIN: 6.9 G/DL (ref 6.1–8)
TRIGL SERPL-MCNC: 148 MG/DL (ref 0–199)
WBC # BLD: 7.8 THOU/MM3 (ref 4.8–10.8)

## 2019-01-09 PROCEDURE — 86803 HEPATITIS C AB TEST: CPT

## 2019-01-09 PROCEDURE — 80053 COMPREHEN METABOLIC PANEL: CPT

## 2019-01-09 PROCEDURE — 80061 LIPID PANEL: CPT

## 2019-01-09 PROCEDURE — 85025 COMPLETE CBC W/AUTO DIFF WBC: CPT

## 2019-01-09 PROCEDURE — 77067 SCR MAMMO BI INCL CAD: CPT

## 2019-01-09 PROCEDURE — 36415 COLL VENOUS BLD VENIPUNCTURE: CPT

## 2019-01-09 PROCEDURE — 71250 CT THORAX DX C-: CPT

## 2019-01-10 ENCOUNTER — TELEPHONE (OUTPATIENT)
Dept: FAMILY MEDICINE CLINIC | Age: 59
End: 2019-01-10

## 2019-01-14 ENCOUNTER — PROCEDURE VISIT (OUTPATIENT)
Dept: CARDIOLOGY CLINIC | Age: 59
End: 2019-01-14
Payer: MEDICARE

## 2019-01-14 DIAGNOSIS — Z45.09 ENCOUNTER FOR ELECTRONIC ANALYSIS OF REVEAL EVENT RECORDER: Primary | ICD-10-CM

## 2019-01-14 PROCEDURE — 93298 REM INTERROG DEV EVAL SCRMS: CPT | Performed by: INTERNAL MEDICINE

## 2019-01-14 PROCEDURE — 93299 PR REM INTERROG ICPMS/SCRMS <30 D TECH REVIEW: CPT | Performed by: INTERNAL MEDICINE

## 2019-01-22 ENCOUNTER — OFFICE VISIT (OUTPATIENT)
Dept: FAMILY MEDICINE CLINIC | Age: 59
End: 2019-01-22
Payer: MEDICARE

## 2019-01-22 ENCOUNTER — NURSE ONLY (OUTPATIENT)
Dept: LAB | Age: 59
End: 2019-01-22

## 2019-01-22 ENCOUNTER — TELEPHONE (OUTPATIENT)
Dept: FAMILY MEDICINE CLINIC | Age: 59
End: 2019-01-22

## 2019-01-22 VITALS
TEMPERATURE: 97.9 F | BODY MASS INDEX: 34.39 KG/M2 | RESPIRATION RATE: 14 BRPM | WEIGHT: 214 LBS | DIASTOLIC BLOOD PRESSURE: 74 MMHG | HEART RATE: 71 BPM | SYSTOLIC BLOOD PRESSURE: 122 MMHG | HEIGHT: 66 IN

## 2019-01-22 DIAGNOSIS — R73.9 HYPERGLYCEMIA: ICD-10-CM

## 2019-01-22 DIAGNOSIS — R91.8 PULMONARY NODULES: Primary | ICD-10-CM

## 2019-01-22 DIAGNOSIS — R73.03 PREDIABETES: Primary | ICD-10-CM

## 2019-01-22 LAB
AVERAGE GLUCOSE: 129 MG/DL (ref 70–126)
GLUCOSE BLD-MCNC: 108 MG/DL (ref 70–108)
HBA1C MFR BLD: 6.3 % (ref 4.4–6.4)

## 2019-01-22 PROCEDURE — 1036F TOBACCO NON-USER: CPT | Performed by: FAMILY MEDICINE

## 2019-01-22 PROCEDURE — G8417 CALC BMI ABV UP PARAM F/U: HCPCS | Performed by: FAMILY MEDICINE

## 2019-01-22 PROCEDURE — G8484 FLU IMMUNIZE NO ADMIN: HCPCS | Performed by: FAMILY MEDICINE

## 2019-01-22 PROCEDURE — 3017F COLORECTAL CA SCREEN DOC REV: CPT | Performed by: FAMILY MEDICINE

## 2019-01-22 PROCEDURE — 99213 OFFICE O/P EST LOW 20 MIN: CPT | Performed by: FAMILY MEDICINE

## 2019-01-22 PROCEDURE — G8427 DOCREV CUR MEDS BY ELIG CLIN: HCPCS | Performed by: FAMILY MEDICINE

## 2019-01-22 PROCEDURE — G8598 ASA/ANTIPLAT THER USED: HCPCS | Performed by: FAMILY MEDICINE

## 2019-02-21 ENCOUNTER — PROCEDURE VISIT (OUTPATIENT)
Dept: CARDIOLOGY CLINIC | Age: 59
End: 2019-02-21
Payer: MEDICARE

## 2019-02-21 DIAGNOSIS — I63.9 CRYPTOGENIC STROKE (HCC): Primary | ICD-10-CM

## 2019-02-21 PROCEDURE — 93298 REM INTERROG DEV EVAL SCRMS: CPT | Performed by: INTERNAL MEDICINE

## 2019-02-21 PROCEDURE — 93299 PR REM INTERROG ICPMS/SCRMS <30 D TECH REVIEW: CPT | Performed by: INTERNAL MEDICINE

## 2019-02-26 DIAGNOSIS — I69.30 HISTORY OF STROKE WITH RESIDUAL DEFICIT: Primary | Chronic | ICD-10-CM

## 2019-02-26 RX ORDER — BENZONATATE 100 MG/1
100 CAPSULE ORAL 3 TIMES DAILY PRN
Qty: 30 CAPSULE | Refills: 5 | Status: SHIPPED | OUTPATIENT
Start: 2019-02-26 | End: 2021-12-07

## 2019-02-26 RX ORDER — CLOPIDOGREL BISULFATE 75 MG/1
75 TABLET ORAL DAILY
Qty: 90 TABLET | Refills: 3 | Status: SHIPPED | OUTPATIENT
Start: 2019-02-26 | End: 2020-03-25 | Stop reason: SDUPTHER

## 2019-03-06 ENCOUNTER — OFFICE VISIT (OUTPATIENT)
Dept: FAMILY MEDICINE CLINIC | Age: 59
End: 2019-03-06
Payer: MEDICARE

## 2019-03-06 VITALS
TEMPERATURE: 98.8 F | BODY MASS INDEX: 35.99 KG/M2 | HEIGHT: 65 IN | RESPIRATION RATE: 16 BRPM | DIASTOLIC BLOOD PRESSURE: 74 MMHG | SYSTOLIC BLOOD PRESSURE: 130 MMHG | WEIGHT: 216 LBS | HEART RATE: 92 BPM

## 2019-03-06 DIAGNOSIS — L57.0 AK (ACTINIC KERATOSIS): Primary | ICD-10-CM

## 2019-03-06 DIAGNOSIS — B07.8 COMMON WART: ICD-10-CM

## 2019-03-06 DIAGNOSIS — L85.3 XEROSIS CUTIS: ICD-10-CM

## 2019-03-06 PROCEDURE — 1036F TOBACCO NON-USER: CPT | Performed by: FAMILY MEDICINE

## 2019-03-06 PROCEDURE — 17000 DESTRUCT PREMALG LESION: CPT | Performed by: FAMILY MEDICINE

## 2019-03-06 PROCEDURE — 3017F COLORECTAL CA SCREEN DOC REV: CPT | Performed by: FAMILY MEDICINE

## 2019-03-06 PROCEDURE — G8484 FLU IMMUNIZE NO ADMIN: HCPCS | Performed by: FAMILY MEDICINE

## 2019-03-06 PROCEDURE — 99213 OFFICE O/P EST LOW 20 MIN: CPT | Performed by: FAMILY MEDICINE

## 2019-03-06 PROCEDURE — G8598 ASA/ANTIPLAT THER USED: HCPCS | Performed by: FAMILY MEDICINE

## 2019-03-06 PROCEDURE — G8427 DOCREV CUR MEDS BY ELIG CLIN: HCPCS | Performed by: FAMILY MEDICINE

## 2019-03-06 PROCEDURE — G8417 CALC BMI ABV UP PARAM F/U: HCPCS | Performed by: FAMILY MEDICINE

## 2019-03-06 ASSESSMENT — PATIENT HEALTH QUESTIONNAIRE - PHQ9
SUM OF ALL RESPONSES TO PHQ9 QUESTIONS 1 & 2: 0
SUM OF ALL RESPONSES TO PHQ QUESTIONS 1-9: 0
2. FEELING DOWN, DEPRESSED OR HOPELESS: 0
SUM OF ALL RESPONSES TO PHQ QUESTIONS 1-9: 0
1. LITTLE INTEREST OR PLEASURE IN DOING THINGS: 0

## 2019-03-15 ENCOUNTER — TELEPHONE (OUTPATIENT)
Dept: CARDIOLOGY CLINIC | Age: 59
End: 2019-03-15

## 2019-03-15 ENCOUNTER — TELEPHONE (OUTPATIENT)
Dept: FAMILY MEDICINE CLINIC | Age: 59
End: 2019-03-15

## 2019-03-15 DIAGNOSIS — R91.8 PULMONARY NODULES: Primary | ICD-10-CM

## 2019-03-21 ENCOUNTER — OFFICE VISIT (OUTPATIENT)
Dept: CARDIOLOGY CLINIC | Age: 59
End: 2019-03-21
Payer: MEDICARE

## 2019-03-21 VITALS
WEIGHT: 210 LBS | DIASTOLIC BLOOD PRESSURE: 84 MMHG | SYSTOLIC BLOOD PRESSURE: 150 MMHG | BODY MASS INDEX: 34.99 KG/M2 | HEART RATE: 67 BPM | HEIGHT: 65 IN

## 2019-03-21 DIAGNOSIS — Z01.810 PREOP CARDIOVASCULAR EXAM: Primary | ICD-10-CM

## 2019-03-21 PROCEDURE — G8417 CALC BMI ABV UP PARAM F/U: HCPCS | Performed by: INTERNAL MEDICINE

## 2019-03-21 PROCEDURE — 3017F COLORECTAL CA SCREEN DOC REV: CPT | Performed by: INTERNAL MEDICINE

## 2019-03-21 PROCEDURE — 99213 OFFICE O/P EST LOW 20 MIN: CPT | Performed by: INTERNAL MEDICINE

## 2019-03-21 PROCEDURE — 93000 ELECTROCARDIOGRAM COMPLETE: CPT | Performed by: INTERNAL MEDICINE

## 2019-03-21 PROCEDURE — 1036F TOBACCO NON-USER: CPT | Performed by: INTERNAL MEDICINE

## 2019-03-21 PROCEDURE — G8484 FLU IMMUNIZE NO ADMIN: HCPCS | Performed by: INTERNAL MEDICINE

## 2019-03-21 PROCEDURE — G8427 DOCREV CUR MEDS BY ELIG CLIN: HCPCS | Performed by: INTERNAL MEDICINE

## 2019-03-21 PROCEDURE — G8598 ASA/ANTIPLAT THER USED: HCPCS | Performed by: INTERNAL MEDICINE

## 2019-03-21 NOTE — PROGRESS NOTES
Maurice Knox is a 11 year old male who presents for 11 year old well child exam.  Patient presents with Father.    Concerns raised today include: none    1 cup, 1% milk daily. 3 Meals and snacks    Social History:   School: [employer]  Hobbies / Activities: Sports  Future Plans: College  Tobacco: denies   Alcohol: denies   Illicit drugs or Homeopathic medicines: denies   Family History: negative for athletic cardiac events      Birth history, medical history, surgical history, and family history reviewed and updated.    PHYSICAL EXAM:  Blood pressure 108/65, pulse 64, resp. rate 18, height 5' 3.5\" (1.613 m), weight 51.3 kg.  GENERAL:  Well appearing  male, nontoxic, no acute distress.  Alert and interactive.  SKIN: Warm, normal turgor.  No cyanosis.  No bruises or lesions.  HEAD:  Normocephalic, atraumatic.    EYES:  Conjunctivae without injection or icterus.  Pupils equal, round, reactive to light, extraocular movements intact. Cover test normal.  NOSE: No flaring.  EARS:  Tympanic membranes transparent with good landmarks.  THROAT:  Oropharynx with moist mucous membranes and no lesions.  NECK:  Supple, no lymphadenopathy or masses.  HEART:  Regular rate and rhythm.  Quiet precordium.  Normal S1, S2.  No murmurs, rubs, gallops.   LUNGS:  Clear to auscultation bilaterally.  No wheezes, rales, rhonchi.  Normal work of breathing.  ABDOMEN:  Soft, nontender.  No organomegaly or masses.  GENITOURINARY:  Bart 3 male, Testes descended bilaterally.  and No hernia present  EXTREMITIES:  Warm, dry, without abnormalities.  NEUROLOGIC:  Normal tone, bulk, strength.    ASSESSMENT:  11 year old male well child.    PLAN:  Great blood pressure noted. Normal liver and spleen on exam.No heart murmur heard on exam.No ankle swelling noted. Dental and Vision exams advised for the future.He is very healthy on exam and may play sports this year. Forms were given in hand for sports clearance.  All parental concerns and  [facility]  [address] [address]  Dept: [phone]  Dept Fax: [phone]  Loc: [phone]    Visit Date: 3/21/2019    Ms. Danielle Liu is a 59 y.o. female  who presented for:  Chief Complaint   Patient presents with    Cardiac Clearance       HPI:   58 yo F c hx of AAA, Chronic pancreatitis, COPD, HTN, HLD, CVA (Cryptogenic) and Obesity is here for preop risk stratification hemorroidal surgery. Patient denies cp, sob, palpitations, ligtheadeness, orthopnea, PND or pedal edema. Reports good exercise tolerance. EKG SR anteriolateral infarct. Echo from 2017 shows normal LVSF, no PFO. Stress 9/2018 which showed it was no ischemia.        Current Outpatient Medications:     clopidogrel (PLAVIX) 75 MG tablet, Take 1 tablet by mouth daily, Disp: 90 tablet, Rfl: 3    benzonatate (TESSALON) 100 MG capsule, Take 1 capsule by mouth 3 times daily as needed for Cough, Disp: 30 capsule, Rfl: 5    famotidine (PEPCID) 20 MG tablet, Take 1 tablet by mouth 2 times daily, Disp: 30 tablet, Rfl: 0    meloxicam (MOBIC) 15 MG tablet, Take 15 mg by mouth daily, Disp: , Rfl:     folic acid (FOLVITE) 1 MG tablet, take 1 tablet by mouth daily, Disp: 30 tablet, Rfl: 11    salmeterol (SEREVENT DISKUS) 50 MCG/DOSE diskus inhaler, Inhale 1 puff into the lungs 2 times daily, Disp: 1 each, Rfl: 3    acetaminophen (TYLENOL) 325 MG tablet, Take 650-975 mg by mouth every 6 hours as needed for Pain, Disp: , Rfl:     buPROPion (WELLBUTRIN SR) 100 MG extended release tablet, Take 1 tablet by mouth 2 times daily, Disp: 60 tablet, Rfl: 3    atorvastatin (LIPITOR) 80 MG tablet, Take 1 tablet by mouth nightly, Disp: 30 tablet, Rfl: 3    albuterol sulfate HFA (PROAIR HFA) 108 (90 BASE) MCG/ACT inhaler, Inhale 2 puffs into the lungs every 6 hours as needed for Wheezing, Disp: 1 Inhaler, Rfl: 3    amLODIPine (NORVASC) 10 MG tablet, Take 1 tablet by mouth daily, Disp: 30 tablet, Rfl: 5    Past Medical History  Pop Del Cid  has a past medical history of AAA (abdominal aortic aneurysm) ([facility] [address] Chronic pancreatitis ([facility] [address] COPD (chronic obstructive pulmonary disease) ([facility] [address] Dyslipidemia, Essential hypertension, GERD (gastroesophageal reflux disease), History of stroke with residual deficit; cryptogenic, Nicotine dependence, Obesity (BMI 30-39.9), and Osteoarthritis. Social History  Pop Del Cid  reports that she quit smoking about 3 months ago. Her smoking use included cigarettes. She has a 45.00 pack-year smoking history. She has never used smokeless tobacco. She reports that she has current or past drug history. Drug: Marijuana. She reports that she does not drink alcohol. Family History  Pop Del Cid family history includes COPD in her father; Cancer in her father; Len Bouillon in an other family member; Diabetes in her maternal aunt, maternal grandmother, and mother; Heart Disease in her father and mother; High Blood Pressure in her brother. Past Surgical History   Past Surgical History:   Procedure Laterality Date    ABDOMEN SURGERY      c sections x3    CARPAL TUNNEL RELEASE Bilateral      SECTION      x3    COLONOSCOPY      Dr. Сергей Cotto EGD  [id number]    INSERTABLE CARDIAC MONITOR      TONSILLECTOMY      as child    UPPER GASTROINTESTINAL ENDOSCOPY         Subjective:     REVIEW OF SYSTEMS  Constitutional: denies sweats, chills and fever  HENT: denies  congestion, sinus pressure, sneezing and sore throat. Eyes: denies  pain, discharge, redness and itching. Respiratory: denies apnea, cough  Gastrointestinal: denies blood in stool, constipation, diarrhea   Endocrine: denies cold intolerance, heat intolerance, polydipsia. Genitourinary: denies dysuria, enuresis, flank pain and hematuria. Musculoskeletal: denies arthralgias, joint swelling and neck pain. Neurological: denies numbness and headaches.    Psychiatric/Behavioral: denies questions discussed.                Immunizations per orders.  Risks, benefits, and side effects discussed.  Return to clinic in 1 year for well child examination or sooner as needed for illness/concerns.                On 7/25/2017, IManda MA, scribed the services personally performed by Benito Lewis MD.     The documentation recorded by the scribe accurately and completely reflects the service(s) I personally performed and the decisions made by me.          agitation, confusion, decreased concentration and dysphoric mood    All others reviewed and are negative. Objective:     BP (!) 146/81   Pulse 67   Ht 5' 5\" (1.651 m)   Wt 210 lb (95.3 kg)   BMI 34.95 kg/m²     Wt Readings from Last 3 Encounters:   03/21/19 210 lb (95.3 kg)   03/06/19 216 lb (98 kg)   02/28/19 216 lb (98 kg)     BP Readings from Last 3 Encounters:   03/21/19 (!) 146/81   03/06/19 130/74   02/28/19 (!) 145/88       PHYSICAL EXAM  Constitutional: Oriented to person, place, and time. Appears well-developed and well-nourished. HENT:   Head: Normocephalic and atraumatic. Eyes: EOM are normal. Pupils are equal, round, and reactive to light. Neck: Normal range of motion. Neck supple. No JVD present. Cardiovascular: Normal rate , normal heart sounds and intact distal pulses. Pulmonary/Chest: Effort normal and breath sounds normal. No respiratory distress. No wheezes. No rales. Abdominal: Soft. Bowel sounds are normal. No distension. There is no tenderness. Musculoskeletal: Normal range of motion. No edema. Neurological: Alert and oriented to person, place, and time. No cranial nerve deficit. Coordination normal.   Skin: Skin is warm and dry. Psychiatric: Normal mood and affect.        Lab Results   Component Value Date    CKTOTAL 109 07/14/2014    CKMB 3.4 07/14/2014    CKMBINDEX 3.1 07/14/2014       Lab Results   Component Value Date    WBC 6.2 03/14/2019    RBC 4.68 03/14/2019    RBC 4.61 01/06/2012    HGB 13.8 03/14/2019    HCT 42.8 03/14/2019    MCV 91.6 03/14/2019    MCH 29.6 03/14/2019    MCHC 32.3 03/14/2019    RDW 14.6 03/14/2019     03/14/2019    MPV 10.5 01/09/2019       Lab Results   Component Value Date     03/14/2019    K 3.9 03/14/2019     03/14/2019    CO2 26 03/14/2019    BUN 15 03/14/2019    LABALBU 4.1 01/09/2019    LABALBU 4.0 10/01/2011    CREATININE 0.84 03/14/2019    CALCIUM 8.9 03/14/2019    LABGLOM 73 01/09/2019    GLUCOSE 108 01/22/2019 visualization due to poor acoustical window. Indications:Stroke. Additional Medical History:COPD, hypertension, GERD, arthritis    Patient Status: Routine    Height: 65 inches Weight: 203 pounds BSA: 1.99 m^2 BMI: 33.78 kg/m^2    BP: 152/82 mmHg     Conclusions      Summary   Normal left ventricle size and systolic function. Ejection fraction was   estimated at 55%. There were no regional left ventricular wall motion   abnormalities and wall thickness was within normal limits. There is a trivial pericardial effusion with no evidence of hemodynamic   compromise. Can't rule out PFO. If clinically indicated, consider transesophageal echocardiography for   further evaluation. Signature      ----------------------------------------------------------------   Electronically signed by Aundrea Au MD (Interpreting   physician) on 05/17/2017 at 04:43 PM   ----------------------------------------------------------------      Findings      Mitral Valve   The mitral valve structure was normal with normal leaflet separation. DOPPLER: The transmitral velocity was within the normal range with no   evidence for mitral stenosis. There was trivial mitral regurgitation. Aortic Valve   The aortic valve was trileaflet with normal thickness and cuspal   separation. DOPPLER: Transaortic velocity was within the normal range with   no evidence of aortic stenosis. There was no evidence of aortic   regurgitation. Tricuspid Valve   The tricuspid valve structure was normal with normal leaflet separation. DOPPLER: There was no evidence of tricuspid stenosis. There was trivial   tricuspid regurgitation. Left Atrium   Left atrial size was normal.   can't rule out PFO. Left Ventricle   Normal left ventricle size and systolic function. Ejection fraction was   estimated at 55%. There were no regional left ventricular wall motion   abnormalities and wall thickness was within normal limits.       Right cm/s   E/E' septal: 11.75   E/E' lateral: 9.43   MR Velocity: 194 cm/s     [id number]       STRESS:    CATH:    Assessment/Plan       Diagnosis Orders   1. Preop cardiovascular exam       Preop for hemorroidal surgery  COPD  CVA  HTN  HLD    Reviewed EKG, Stress and echo  Reports good exercise tolerance  Denies any cardiac symptoms and reports good exercise tolerance  May proceed with scheduled surgery at low risk for perioperative cardiac event  Continue Plavix (hx of CVA), statin  Continue rest of the management  Smoking cessation advised  Continue amlodipine  The patient is asked to make an attempt to improve diet and exercise patterns to aid in medical management of this problem. Advised more plant based nutrition/meditarrean diet   Advised patient to call office or seek immediate medical attention if there is any new onset of  any chest pain, sob, palpitations, lightheadedness, dizziness, orthopnea, PND or pedal edema. All medication side effects were discussed in details. Thank youfor allowing me to participate in the care of this patient. Please do not hesitate to contact me for any further questions. Return in about 1 year (around 3/21/2020), or if symptoms worsen or fail to improve, for Review testing, Regular follow up.        Electronically signed by Teresa Dumont MD [facility]  3/21/2019 at 1:38 PM

## 2019-03-29 ENCOUNTER — PROCEDURE VISIT (OUTPATIENT)
Dept: CARDIOLOGY CLINIC | Age: 59
End: 2019-03-29
Payer: MEDICARE

## 2019-03-29 DIAGNOSIS — I63.9 CRYPTOGENIC STROKE (HCC): Primary | ICD-10-CM

## 2019-03-29 PROCEDURE — 93298 REM INTERROG DEV EVAL SCRMS: CPT | Performed by: INTERNAL MEDICINE

## 2019-03-29 PROCEDURE — 93299 PR REM INTERROG ICPMS/SCRMS <30 D TECH REVIEW: CPT | Performed by: INTERNAL MEDICINE

## 2019-04-10 ENCOUNTER — HOSPITAL ENCOUNTER (OUTPATIENT)
Dept: CT IMAGING | Age: 59
Discharge: HOME | End: 2019-04-10
Payer: MEDICARE

## 2019-04-10 DIAGNOSIS — R91.8 PULMONARY NODULES: ICD-10-CM

## 2019-04-10 PROCEDURE — 71250 CT THORAX DX C-: CPT

## 2019-04-11 ENCOUNTER — TELEPHONE (OUTPATIENT)
Dept: FAMILY MEDICINE CLINIC | Age: 59
End: 2019-04-11

## 2019-04-11 NOTE — TELEPHONE ENCOUNTER
----- Message from Allan Curran DO sent at 4/11/2019  8:05 AM EDT -----  Please let pt know that CT scan was stable, no concerning findings. Radiologist recommends repetat 1 year for routine lung cancer screening. Let me know if questions, thanks!

## 2019-04-15 ENCOUNTER — TELEPHONE (OUTPATIENT)
Dept: CARDIOLOGY CLINIC | Age: 59
End: 2019-04-15

## 2019-04-15 DIAGNOSIS — I10 ESSENTIAL HYPERTENSION: Chronic | ICD-10-CM

## 2019-04-15 DIAGNOSIS — J43.9 PULMONARY EMPHYSEMA, UNSPECIFIED EMPHYSEMA TYPE (HCC): Chronic | ICD-10-CM

## 2019-04-15 DIAGNOSIS — E66.9 OBESITY (BMI 30-39.9): Chronic | ICD-10-CM

## 2019-04-15 DIAGNOSIS — I71.40 ABDOMINAL AORTIC ANEURYSM (AAA) WITHOUT RUPTURE: Chronic | ICD-10-CM

## 2019-04-15 DIAGNOSIS — R06.02 SHORTNESS OF BREATH: Primary | ICD-10-CM

## 2019-04-15 DIAGNOSIS — R91.8 PULMONARY NODULES: ICD-10-CM

## 2019-04-15 NOTE — TELEPHONE ENCOUNTER
UNSCHEDULE LINQ TRANSMISSION  PT HAD A 3.6 SECOND PAUSE ON 04/01/19 AT 12:06 AM/NO SLEEP APNEA   PT NOTIFIED AND INFORMED THAT SHE WAS ASLEEP WHEN THIS HAPPENED  TOLD PT WE WILL CONTINUE TO MONITOR

## 2019-04-17 NOTE — TELEPHONE ENCOUNTER
Please refer to sleep clinic for sleep apnea evaluation and also have her follow up with me.  thanks

## 2019-04-22 ENCOUNTER — OFFICE VISIT (OUTPATIENT)
Dept: CARDIOLOGY CLINIC | Age: 59
End: 2019-04-22
Payer: MEDICARE

## 2019-04-22 VITALS
DIASTOLIC BLOOD PRESSURE: 70 MMHG | BODY MASS INDEX: 35.53 KG/M2 | WEIGHT: 213.25 LBS | HEART RATE: 70 BPM | HEIGHT: 65 IN | SYSTOLIC BLOOD PRESSURE: 130 MMHG

## 2019-04-22 DIAGNOSIS — I63.9 CEREBROVASCULAR ACCIDENT (CVA), UNSPECIFIED MECHANISM (HCC): Primary | ICD-10-CM

## 2019-04-22 PROCEDURE — 1036F TOBACCO NON-USER: CPT | Performed by: INTERNAL MEDICINE

## 2019-04-22 PROCEDURE — 3017F COLORECTAL CA SCREEN DOC REV: CPT | Performed by: INTERNAL MEDICINE

## 2019-04-22 PROCEDURE — G8598 ASA/ANTIPLAT THER USED: HCPCS | Performed by: INTERNAL MEDICINE

## 2019-04-22 PROCEDURE — 99214 OFFICE O/P EST MOD 30 MIN: CPT | Performed by: INTERNAL MEDICINE

## 2019-04-22 PROCEDURE — G8427 DOCREV CUR MEDS BY ELIG CLIN: HCPCS | Performed by: INTERNAL MEDICINE

## 2019-04-22 PROCEDURE — G8417 CALC BMI ABV UP PARAM F/U: HCPCS | Performed by: INTERNAL MEDICINE

## 2019-04-22 NOTE — PROGRESS NOTES
[address] [facility] CARDIOLOGY  [address] [address]  Dept: [phone]  Dept Fax: [phone]  Loc: [phone]    Visit Date: 4/22/2019    Ms. Queenie Ambrosio is a 59 y.o. female  who presented for:  Chief Complaint   Patient presents with    Check-Up     3.6 pause     Hypertension       HPI:   58 yo F c hx of AAA, Chronic pancreatitis, COPD, HTN, HLD, CVA (Cryptogenic) and Obesity is here since she had 3.6 sec pause on her Linq device. Patient denies any symptoms. No presyncope or syncope.           Current Outpatient Medications:     clopidogrel (PLAVIX) 75 MG tablet, Take 1 tablet by mouth daily, Disp: 90 tablet, Rfl: 3    benzonatate (TESSALON) 100 MG capsule, Take 1 capsule by mouth 3 times daily as needed for Cough, Disp: 30 capsule, Rfl: 5    famotidine (PEPCID) 20 MG tablet, Take 1 tablet by mouth 2 times daily, Disp: 30 tablet, Rfl: 0    meloxicam (MOBIC) 15 MG tablet, Take 15 mg by mouth daily, Disp: , Rfl:     folic acid (FOLVITE) 1 MG tablet, take 1 tablet by mouth daily, Disp: 30 tablet, Rfl: 11    salmeterol (SEREVENT DISKUS) 50 MCG/DOSE diskus inhaler, Inhale 1 puff into the lungs 2 times daily, Disp: 1 each, Rfl: 3    acetaminophen (TYLENOL) 325 MG tablet, Take 650-975 mg by mouth every 6 hours as needed for Pain, Disp: , Rfl:     buPROPion (WELLBUTRIN SR) 100 MG extended release tablet, Take 1 tablet by mouth 2 times daily, Disp: 60 tablet, Rfl: 3    atorvastatin (LIPITOR) 80 MG tablet, Take 1 tablet by mouth nightly, Disp: 30 tablet, Rfl: 3    albuterol sulfate HFA (PROAIR HFA) 108 (90 BASE) MCG/ACT inhaler, Inhale 2 puffs into the lungs every 6 hours as needed for Wheezing, Disp: 1 Inhaler, Rfl: 3    amLODIPine (NORVASC) 10 MG tablet, Take 1 tablet by mouth daily, Disp: 30 tablet, Rfl: 5    Past Medical History  Ifeanyi Francois  has a past medical history of AAA (abdominal aortic aneurysm) ([facility] [address] Chronic pancreatitis ([address] COPD (chronic obstructive pulmonary disease) ([facility] [address] Dyslipidemia, Essential hypertension, GERD (gastroesophageal reflux disease), History of stroke with residual deficit; cryptogenic, Nicotine dependence, Obesity (BMI 30-39.9), and Osteoarthritis. Social History  Dominique Iqbal  reports that she quit smoking about 4 months ago. Her smoking use included cigarettes. She has a 45.00 pack-year smoking history. She has never used smokeless tobacco. She reports that she has current or past drug history. Drug: Marijuana. She reports that she does not drink alcohol. Family History  Dominique Iqbal family history includes COPD in her father; Cancer in her father; Refugia Buoy in an other family member; Diabetes in her maternal aunt, maternal grandmother, and mother; Heart Disease in her father and mother; High Blood Pressure in her brother. Past Surgical History   Past Surgical History:   Procedure Laterality Date    ABDOMEN SURGERY      c sections x3    CARPAL TUNNEL RELEASE Bilateral      SECTION      x3    COLONOSCOPY      Dr. Salvador Adams EGD  [id number]    INSERTABLE CARDIAC MONITOR      TONSILLECTOMY      as child    UPPER GASTROINTESTINAL ENDOSCOPY         Subjective:     REVIEW OF SYSTEMS  Constitutional: denies sweats, chills and fever  HENT: denies  congestion, sinus pressure, sneezing and sore throat. Eyes: denies  pain, discharge, redness and itching. Respiratory: denies apnea, cough  Gastrointestinal: denies blood in stool, constipation, diarrhea   Endocrine: denies cold intolerance, heat intolerance, polydipsia. Genitourinary: denies dysuria, enuresis, flank pain and hematuria. Musculoskeletal: denies arthralgias, joint swelling and neck pain. Neurological: denies numbness and headaches. Psychiatric/Behavioral: denies agitation, confusion, decreased concentration and dysphoric mood    All others reviewed and are negative.    Objective:     /70   Pulse 70   Ht 5' 5\" (1.651 m)   Wt 213 lb 4 oz (96.7 kg)   BMI 35.49 kg/m²     Wt Readings from Last 3 Encounters:   04/22/19 213 lb 4 oz (96.7 kg)   03/21/19 210 lb (95.3 kg)   03/06/19 216 lb (98 kg)     BP Readings from Last 3 Encounters:   04/22/19 130/70   03/21/19 (!) 150/84   03/06/19 130/74       PHYSICAL EXAM  Constitutional: Oriented to person, place, and time. Appears well-developed and well-nourished. HENT:   Head: Normocephalic and atraumatic. Eyes: EOM are normal. Pupils are equal, round, and reactive to light. Neck: Normal range of motion. Neck supple. No JVD present. Cardiovascular: Normal rate , normal heart sounds and intact distal pulses. Pulmonary/Chest: Effort normal and breath sounds normal. No respiratory distress. No wheezes. No rales. Abdominal: Soft. Bowel sounds are normal. No distension. There is no tenderness. Musculoskeletal: Normal range of motion. No edema. Neurological: Alert and oriented to person, place, and time. No cranial nerve deficit. Coordination normal.   Skin: Skin is warm and dry. Psychiatric: Normal mood and affect.        Lab Results   Component Value Date    CKTOTAL 109 07/14/2014    CKMB 3.4 07/14/2014    CKMBINDEX 3.1 07/14/2014       Lab Results   Component Value Date    WBC 6.2 03/14/2019    RBC 4.68 03/14/2019    RBC 4.61 01/06/2012    HGB 13.8 03/14/2019    HCT 42.8 03/14/2019    MCV 91.6 03/14/2019    MCH 29.6 03/14/2019    MCHC 32.3 03/14/2019    RDW 14.6 03/14/2019     03/14/2019    MPV 10.5 01/09/2019       Lab Results   Component Value Date     03/14/2019    K 3.9 03/14/2019     03/14/2019    CO2 26 03/14/2019    BUN 15 03/14/2019    LABALBU 4.1 01/09/2019    LABALBU 4.0 10/01/2011    CREATININE 0.84 03/14/2019    CALCIUM 8.9 03/14/2019    LABGLOM 73 01/09/2019    GLUCOSE 108 01/22/2019    GLUCOSE 88 07/14/2014       Lab Results   Component Value Date    ALKPHOS 119 01/09/2019    ALT 13 01/09/2019    AST 13 01/09/2019    PROT 6.9 01/09/2019    BILITOT 0.5 01/09/2019    BILIDIR <0.2 11/18/2018    LABALBU 4.1 01/09/2019    LABALBU 4.0 10/01/2011       Lab Results   Component Value Date    MG 2.0 03/15/2018       Lab Results   Component Value Date    INR 0.98 05/15/2017    INR 1.11 07/14/2014    PROTIME 12.0 07/14/2014         Lab Results   Component Value Date    LABA1C 6.3 01/22/2019       Lab Results   Component Value Date    TRIG 148 01/09/2019    HDL 47 01/09/2019    LDLCALC 95 01/09/2019       Lab Results   Component Value Date    TSH 2.910 06/21/2018         Testing Reviewed:      I haveindividually reviewed the below cardiac tests    EKG:    ECHO:   Results for orders placed during the hospital encounter of 05/15/17   Echocardiogram 2D/ M-Mode/ Colorflow/ Do    Narrative Transthoracic Echocardiography Report (TTE)     Demographics      Patient Name   Regina Sumi  Gender               Female                  S      MR #           [id number]        Race                                                       Ethnicity      Account #      [de-identified]          Room Number          0003      Accession      [id number]        Date of Study        05/16/2017   Number      Date of Birth  1960       Referring Physician  Ariadne Nino MD      Age            60 year(s)       [address] [facility]                                      Interpreting         Echo reader of the                                   Physician            evangelina Yoo MD     Procedure    Type of Study      TTE procedure:ECHOCARDIOGRAM COMPLETE 2D W DOPPLER W COLOR. Procedure Date  Date: 05/16/2017 Start: 08:14 AM    Study Location: Bedside  Technical Quality: Limited visualization due to poor acoustical window. Indications:Stroke.     Additional Medical History:COPD, hypertension, GERD, arthritis    Patient Status: Routine    Height: 65 inches Weight: 203 pounds BSA: 1.99 m^2 BMI: 33.78 kg/m^2    BP: 152/82 mmHg     Conclusions      Summary   Normal left ventricle size and systolic function. Ejection fraction was   estimated at 55%. There were no regional left ventricular wall motion   abnormalities and wall thickness was within normal limits. There is a trivial pericardial effusion with no evidence of hemodynamic   compromise. Can't rule out PFO. If clinically indicated, consider transesophageal echocardiography for   further evaluation. Signature      ----------------------------------------------------------------   Electronically signed by Patricio Payne MD (Interpreting   physician) on 05/17/2017 at 04:43 PM   ----------------------------------------------------------------      Findings      Mitral Valve   The mitral valve structure was normal with normal leaflet separation. DOPPLER: The transmitral velocity was within the normal range with no   evidence for mitral stenosis. There was trivial mitral regurgitation. Aortic Valve   The aortic valve was trileaflet with normal thickness and cuspal   separation. DOPPLER: Transaortic velocity was within the normal range with   no evidence of aortic stenosis. There was no evidence of aortic   regurgitation. Tricuspid Valve   The tricuspid valve structure was normal with normal leaflet separation. DOPPLER: There was no evidence of tricuspid stenosis. There was trivial   tricuspid regurgitation. Left Atrium   Left atrial size was normal.   can't rule out PFO. Left Ventricle   Normal left ventricle size and systolic function. Ejection fraction was   estimated at 55%. There were no regional left ventricular wall motion   abnormalities and wall thickness was within normal limits. Right Atrium   Right atrial size was normal.      Right Ventricle   The right ventricular size was normal with normal systolic function and   wall thickness. Pericardial Effusion   There is a trivial pericardial effusion with no evidence of hemodynamic   compromise. Aorta / Great Vessels   Aortic root dimension within normal limits.      M-Mode/2D Measurements & Calculations      LV Diastolic    LV Systolic Dimension: 2.8   AV Cusp Separation: 1.7 cmLA   Dimension: 3.9  cm                           Dimension: 3 cmAO Root   cm              LV Volume Diastolic: 93.1 ml Dimension: 2.8 cm   LV FS:28.2 %    LV Volume Systolic: 91.7 ml   LV PW           LV EDV/LV EDV Index: 73.9   Diastolic: 0.7  WJ/84 A^9QI ESV/LV ESV   cm              Index: 29.6 ml/15 m^2        RV Diastolic Dimension: 2 cm   Septum          EF Calculated: 70.7 %   Diastolic: 0.8                               LA/Aorta: 1.07   cm     Doppler Measurements & Calculations      MV Peak E-Wave: 74.5 cm/s  AV Peak Velocity: 183 LVOT Peak Velocity: 112   MV Peak A-Wave: 92.3 cm/s  cm/s                  cm/s   MV E/A Ratio: 0.81         AV Peak Gradient:     LVOT Peak Gradient: 5   MV Peak Gradient: 2.22     13.4 mmHg             mmHg   mmHg                                                    TV Peak E-Wave: 69.6 cm/s   MV Deceleration Time: 239                        TV Peak A-Wave: 55.3 cm/s   msec                              IVRT: 81 msec         TV Peak Gradient: 1.94                                                    mmHg   MV E' Septal Velocity:                           TR Velocity:224 cm/s   6.34 cm/s                  AV DVI (Vmax):0.61    TR Gradient:20.07 mmHg   MV A' Septal Velocity:                           PV Peak Velocity: 119   9.07 cm/s                                        cm/s   MV E' Lateral Velocity:                          PV Peak Gradient: 5.66   7.9 cm/s                                         mmHg   MV A' Lateral Velocity:   14.1 cm/s   E/E' septal: 11.75   E/E' lateral: 9.43   MR Velocity: 194 cm/s [id number]       STRESS:    CATH:    Assessment/Plan       Diagnosis Orders   1. Cerebrovascular accident (CVA), unspecified mechanism ([address]       Pause 3.6, no symptoms  COPD  CVA  HTN  HLD    Will refer to sleep study  Reviewed EKG, Stress and echo  Reports good exercise tolerance  Denies any cardiac symptoms and reports good exercise tolerance  Continue Plavix (hx of CVA), statin  Smoking cessation advised  Continue amlodipine  The patient is asked to make an attempt to improve diet and exercise patterns to aid in medical management of this problem. Advised more plant based nutrition/meditarrean diet   Advised patient to call office or seek immediate medical attention if there is any new onset of  any chest pain, sob, palpitations, lightheadedness, dizziness, orthopnea, PND or pedal edema. All medication side effects were discussed in details. Thank youfor allowing me to participate in the care of this patient. Please do not hesitate to contact me for any further questions. Return in about 6 months (around 10/22/2019), or if symptoms worsen or fail to improve, for Review testing, Regular follow up.        Electronically signed by Hien Stubbs MD [facility]  4/22/2019 at 4:27 PM

## 2019-05-03 ENCOUNTER — PROCEDURE VISIT (OUTPATIENT)
Dept: CARDIOLOGY CLINIC | Age: 59
End: 2019-05-03
Payer: MEDICARE

## 2019-05-03 DIAGNOSIS — I63.9 CRYPTOGENIC STROKE (HCC): Primary | ICD-10-CM

## 2019-05-03 PROCEDURE — 93298 REM INTERROG DEV EVAL SCRMS: CPT | Performed by: INTERNAL MEDICINE

## 2019-05-03 PROCEDURE — 93299 PR REM INTERROG ICPMS/SCRMS <30 D TECH REVIEW: CPT | Performed by: INTERNAL MEDICINE

## 2019-05-07 NOTE — PROGRESS NOTES
TODAY: has not done labs yet. No polyphagia or dypsia. Urinary sxs improving as above.         Age/Gender Health Maintenance    Lipid -   Lab Results   Component Value Date    CHOL 172 01/09/2019    CHOL 238 (H) 06/21/2018    CHOL 242 (H) 05/16/2017     Lab Results   Component Value Date    TRIG 148 01/09/2019    TRIG 139 06/21/2018    TRIG 247 (H) 05/16/2017     Lab Results   Component Value Date    HDL 47 01/09/2019    HDL 45 06/21/2018    HDL 35 05/16/2017     Lab Results   Component Value Date    LDLCALC 95 01/09/2019    LDLCALC 165 06/21/2018    LDLCALC 158 05/16/2017     Lab Results   Component Value Date    VLDL 33 12/14/2011     No results found for: CHOLHDLRATIO      DM Screen - 101 fasting, (June 2018)  Lab Results   Component Value Date    GLUCOSE 158 05/04/2019       Colon Cancer Screening - + tubular adenoma July 2014; repeat 5 years per [place]  Lung Cancer Screening (Age 52 to [de-identified] with 30 pack year hx, current smoker or quit within past 15 years) - + nodule JAN 2019, repeat 3 months/repeat NEG APR 2019, repeat 1 year per rads    Tetanus - discuss next visit  Influenza Vaccine - declines JAN 2019  Pneumonia Vaccine - UTD PPV 23 APR 2014  Zostavax - discuss next visit     Breast Cancer Screening - NEG JAN 2019  Cervical Cancer Screening - NEG FEB 2019   Osteoporosis Screening - age 70      Specialist List  Neuro: Yayo  GI: Bettina Skeans: [facility]  Cardio: [facility]  [facility]: [facility]      Current Outpatient Medications   Medication Sig Dispense Refill    clopidogrel (PLAVIX) 75 MG tablet Take 1 tablet by mouth daily 90 tablet 3    benzonatate (TESSALON) 100 MG capsule Take 1 capsule by mouth 3 times daily as needed for Cough 30 capsule 5    famotidine (PEPCID) 20 MG tablet Take 1 tablet by mouth 2 times daily 30 tablet 0    meloxicam (MOBIC) 15 MG tablet Take 15 mg by mouth daily      folic acid (FOLVITE) 1 MG tablet take 1 tablet by mouth daily 30 tablet 11    salmeterol (SEREVENT DISKUS) 50 MCG/DOSE diskus inhaler Inhale 1 puff into the lungs 2 times daily 1 each 3    acetaminophen (TYLENOL) 325 MG tablet Take 650-975 mg by mouth every 6 hours as needed for Pain      buPROPion (WELLBUTRIN SR) 100 MG extended release tablet Take 1 tablet by mouth 2 times daily 60 tablet 3    atorvastatin (LIPITOR) 80 MG tablet Take 1 tablet by mouth nightly 30 tablet 3    albuterol sulfate HFA (PROAIR HFA) 108 (90 BASE) MCG/ACT inhaler Inhale 2 puffs into the lungs every 6 hours as needed for Wheezing 1 Inhaler 3    amLODIPine (NORVASC) 10 MG tablet Take 1 tablet by mouth daily 30 tablet 5     No current facility-administered medications for this visit. No orders of the defined types were placed in this encounter. All medications reviewed and reconciled, including OTC and herbal medications. Updated list given to patient.        Patient Active Problem List    Diagnosis Date Noted    Pulmonary nodule     Shortness of breath 09/04/2018    Chronic bilateral low back pain with left-sided sciatica 06/12/2018    GERD (gastroesophageal reflux disease)     Former smoker     Osteoarthritis     Dyslipidemia 05/19/2017    Cryptogenic stroke ([facility] [address] 05/15/2017    History of stroke with residual deficit; cryptogenic 05/15/2017     [facility]      Obesity (BMI 30-39.9) 05/08/2014    Essential hypertension 05/08/2014    Chronic pancreatitis ([address] 05/08/2014    AAA (abdominal aortic aneurysm) ([address] 04/18/2014     Follows with Dr. Isaías mauricio      COPD (chronic obstructive pulmonary disease) ([facility] [address]        Past Medical History:   Diagnosis Date    AAA (abdominal aortic aneurysm) ([address] 04/18/2014    Follows with Dr. Isaías mauricio    Chronic pancreatitis ([address]     COPD (chronic obstructive pulmonary disease) ([address]     Dyslipidemia     Essential hypertension 5/8/2014    Former smoker     GERD (gastroesophageal reflux disease)     History of stroke with residual deficit; cryptogenic 05/2017 Follow[facility] and [facility]    Obesity (BMI 30-39. 9)     Osteoarthritis        Past Surgical History:   Procedure Laterality Date    ABDOMEN SURGERY      c sections x3    CARPAL TUNNEL RELEASE Bilateral      SECTION      x3    COLONOSCOPY      Dr. Beth Camargo EGD  [phone]    INSERTABLE CARDIAC MONITOR      TONSILLECTOMY      as child    UPPER GASTROINTESTINAL ENDOSCOPY           Allergies   Allergen Reactions    Aspirin      Upset stomach    Flagyl [Metronidazole] Other (See Comments)     abd pain    Ibuprofen      Stomach pain    Spiriva Handihaler [Tiotropium Bromide Monohydrate] Other (See Comments)     Upper chest pain     Sulfa Antibiotics Other (See Comments)     PT WAS KID DOESNT KNOW         Social History     Tobacco Use    Smoking status: Former Smoker     Packs/day: 1.00     Years: 45.00     Pack years: 45.00     Types: Cigarettes     Last attempt to quit: 2018     Years since quittin.4    Smokeless tobacco: Never Used    Tobacco comment: already given smoking cessation information  at previous appt    Substance Use Topics    Alcohol use: No     Alcohol/week: 0.0 oz         Family History   Problem Relation Age of Onset    Diabetes Maternal Grandmother     Diabetes Mother     Heart Disease Mother     Cancer Father         Something in the chest, pt not sure what    Heart Disease Father     COPD Father     Diabetes Maternal Aunt     High Blood Pressure Brother     Colon Cancer Other     Arthritis Neg Hx     Asthma Neg Hx     Birth Defects Neg Hx     Depression Neg Hx     Early Death Neg Hx     Hearing Loss Neg Hx     High Cholesterol Neg Hx     Kidney Disease Neg Hx     Learning Disabilities Neg Hx     Mental Illness Neg Hx     Mental Retardation Neg Hx     Miscarriages / Stillbirths Neg Hx     Stroke Neg Hx     Substance Abuse Neg Hx     Vision Loss Neg Hx     Breast Cancer Neg Hx          I have reviewed the patient's past medical history, past surgical history, allergies, medications, social and family history and I have made updates where appropriate. Review of Systems  Positive responses are highlighted in bold    Constitutional:  Fever, Chills, Night Sweats, Fatigue, Unexpected changes in weight  HENT:  Ear pain, Tinnitus, Nosebleeds, Trouble swallowing, Hearing loss, Sore throat  Cardiovascular:  Chest Pain, Palpitations, Orthopnea, Paroxysmal Nocturnal Dyspnea  Respiratory:  Cough, Wheezing, Shortness of breath, Chest tightness, Apnea  Gastrointestinal:  Nausea, Vomiting, Diarrhea, Constipation, Heartburn, Blood in stool  Genitourinary:  Difficulty or painful urination, Flank pain, Change in frequency, Urgency  Skin:  Color change, Rash, Itching, Wound  Musculoskeletal:  Joint pain, Back pain, Gait problems, Joint swelling, Myalgias  Neurological:  Dizziness, Headaches, Presyncope, Numbness, Seizures, Tremors  Endocrine:  Heat Intolerance, Cold Intolerance, Polydipsia, Polyphagia, Polyuria      PHYSICAL EXAM:  Vitals:    05/08/19 1409   BP: 139/78   Pulse: 89   Resp: 18   Temp: 98.3 °F (36.8 °C)   TempSrc: Oral   Weight: 203 lb (92.1 kg)   Height: 5' 4\" (1.626 m)     Body mass index is 34.84 kg/m².   Pain Score:   0 - No pain    VS Reviewed  General Appearance: A&O x 3, No acute distress,well developed and well- nourished  Eyes: pupils equal, round, and reactive to light, extraocular eye movements intact, conjunctivae and eye lids without erythema  ENT: external ear and ear canal clear bilaterally, TMs intact and regular, nose without deformity, nasal mucosa and turbinates normal without polyps, oropharynx normal, dentition is normal for age  Neck: supple and non-tender without mass, no thyromegaly or thyroid nodules, no cervical lymphadenopathy  Pulmonary/Chest: distant but clear to auscultation bilaterally- no wheezes, rales or rhonchi, normal air movement, no respiratory distress or retractions  Cardiovascular: distant with S1 and S2 auscultated w/ RRR. No murmurs, rubs, clicks, or gallops, distal pulses intact. Abdomen: soft, non-tender, non-distended, bowel sounds physiologic,  no rebound or guarding, no masses or hernias noted. Liver and spleen without enlargement. Extremities: no cyanosis, clubbing or edema of the lower extremities. +2 PT/DP bilaterally. Musculoskeletal: No joint swelling or gross deformity   Skin: warm and dry. Also with 0.4 cm x 0.4 cm verrucous papule on anterior R shin and on L thumb     Results for POC orders placed in visit on 05/08/19   POCT Urinalysis no Micro   Result Value Ref Range    Color, UA yellow     Clarity, UA clear     Glucose, UA POC neg     Bilirubin, UA neg     Ketones, UA neg     Spec Grav, UA >=1.030     Blood, UA POC neg     pH, UA 5.5     Protein, UA POC neg     Urobilinogen, UA 0.2     Leukocytes, UA neg     Nitrite, UA neg        Narrative   PROCEDURE: CT CHEST WO CONTRAST       CLINICAL INFORMATION: Pulmonary nodules.       COMPARISON: Multiple previous most recent 1/19/2019       TECHNIQUE: 5 mm noncontrast axial images were obtained through the chest. Sagittal and coronal reconstructions were obtained.       All CT scans at this facility use dose modulation, iterative reconstruction, and/or weight-based dosing when appropriate to reduce radiation dose to as low as reasonably achievable.       FINDINGS:       Replaced right subclavian artery.       The heart size is normal. No pericardial effusion. There is moderate coronary calcification. There is a cardiac event recorder.       The aorta is of normal caliber.        There is no gross abnormality of the pulmonary artery.        There is no mediastinal, axillary or hilar adenopathy.        There are no pleural effusions.        Mild scarring superior segment left lower lobe appears stable.  Current scans were performed with 5 mm sections rather than one mm sections.        No suspicious osseous lesions are present.                 Impression        No acute abnormality. Patient can return to yearly screening.                   **This report has been created using voice recognition software. It may contain minor errors which are inherent in voice recognition technology. **       Final report electronically signed by Dr. Delia Castleman on 4/10/2019 2:02 PM       ASSESSMENT & PLAN  1. Urinary frequency    Suspect may have had a UTI that is being treated by doxy  sxs about gone  UA clear  Will send for culture, adjust treatment based on results  F/u if sxs don't resolve or recur    - Urine Culture; Future    2. Chronic obstructive pulmonary disease with acute exacerbation ([address]    Improving  Lung exam clear today  con't inhalers  Finish doxy  F/u if no better  Reviewed ER precautions, pt understands. 3. Pulmonary nodule    F/u CT stable  Rads recommends repeat 1 year    4. AK (actinic keratosis)    Resolved after cryo, f/u prn    5. Common wart    Cryo x 3 to both warts today  F/u in 4 wks if not gone    6. Xerosis cutis    Improved with moisture trapping, con't this    7. Hyperglycemia    Reminded to get FBS/a1c, has order  Will have her get in 4 wks after off pred      DISPOSITION    Return in 2 months (on 7/8/2019) for follow-up on chronic medical conditions, sooner as needed. Lyndsey Gonzalez released without restrictions. Future Appointments   Date Time Provider Kimber Jacinto   5/22/2019  2:00 PM Claudio Cui MD Pul Med [facility] [place]   7/8/2019  8:00 AM Rajan Herman DO [facility] [address]     PATIENT COUNSELING    Patient given educational materials on: See Attached    Barriers to learning and self management: none    Discussed use, benefit, and side effects of prescribed medications. Barriers to medication compliance addressed. All patient questions answered. Pt voiced understanding.        Electronically signed by Rajan Herman DO on 5/8/2019 at 4:24 PM

## 2019-05-08 ENCOUNTER — OFFICE VISIT (OUTPATIENT)
Dept: FAMILY MEDICINE CLINIC | Age: 59
End: 2019-05-08
Payer: MEDICARE

## 2019-05-08 VITALS
RESPIRATION RATE: 18 BRPM | WEIGHT: 203 LBS | DIASTOLIC BLOOD PRESSURE: 78 MMHG | BODY MASS INDEX: 34.66 KG/M2 | HEART RATE: 89 BPM | SYSTOLIC BLOOD PRESSURE: 139 MMHG | TEMPERATURE: 98.3 F | HEIGHT: 64 IN

## 2019-05-08 DIAGNOSIS — L85.3 XEROSIS CUTIS: ICD-10-CM

## 2019-05-08 DIAGNOSIS — L57.0 AK (ACTINIC KERATOSIS): ICD-10-CM

## 2019-05-08 DIAGNOSIS — R73.9 HYPERGLYCEMIA: ICD-10-CM

## 2019-05-08 DIAGNOSIS — R35.0 URINARY FREQUENCY: Primary | ICD-10-CM

## 2019-05-08 DIAGNOSIS — B07.8 COMMON WART: ICD-10-CM

## 2019-05-08 DIAGNOSIS — R91.1 PULMONARY NODULE: ICD-10-CM

## 2019-05-08 DIAGNOSIS — J44.1 CHRONIC OBSTRUCTIVE PULMONARY DISEASE WITH ACUTE EXACERBATION (HCC): ICD-10-CM

## 2019-05-08 LAB
BILIRUBIN, POC: NORMAL
BLOOD URINE, POC: NORMAL
CLARITY, POC: CLEAR
COLOR, POC: YELLOW
GLUCOSE URINE, POC: NORMAL
KETONES, POC: NORMAL
LEUKOCYTE EST, POC: NORMAL
NITRITE, POC: NORMAL
PH, POC: 5.5
PROTEIN, POC: NORMAL
SPECIFIC GRAVITY, POC: >=1.03
UROBILINOGEN, POC: 0.2

## 2019-05-08 PROCEDURE — G8427 DOCREV CUR MEDS BY ELIG CLIN: HCPCS | Performed by: FAMILY MEDICINE

## 2019-05-08 PROCEDURE — G8598 ASA/ANTIPLAT THER USED: HCPCS | Performed by: FAMILY MEDICINE

## 2019-05-08 PROCEDURE — 3023F SPIROM DOC REV: CPT | Performed by: FAMILY MEDICINE

## 2019-05-08 PROCEDURE — G8926 SPIRO NO PERF OR DOC: HCPCS | Performed by: FAMILY MEDICINE

## 2019-05-08 PROCEDURE — G8417 CALC BMI ABV UP PARAM F/U: HCPCS | Performed by: FAMILY MEDICINE

## 2019-05-08 PROCEDURE — 1036F TOBACCO NON-USER: CPT | Performed by: FAMILY MEDICINE

## 2019-05-08 PROCEDURE — 81002 URINALYSIS NONAUTO W/O SCOPE: CPT | Performed by: FAMILY MEDICINE

## 2019-05-08 PROCEDURE — 99214 OFFICE O/P EST MOD 30 MIN: CPT | Performed by: FAMILY MEDICINE

## 2019-05-08 PROCEDURE — 3017F COLORECTAL CA SCREEN DOC REV: CPT | Performed by: FAMILY MEDICINE

## 2019-05-08 NOTE — PATIENT INSTRUCTIONS
LAB INSTRUCTIONS:    Please complete labs IN 4 week(s). Please fast for 8 hours prior to lab collection. The clinic will call you within 1 week of collection. If you have not heard from us within that amount of time, please call us at [phone]. Patient Education        Learning About Lung Nodules  What is a lung nodule? A lung nodule is a growth in the lung. A single nodule surrounded by lung tissue is called a solitary pulmonary nodule. A lung nodule might not cause any symptoms. Your doctor may have found one or more nodules on your lung when you were having a chest X-ray or CT scan. Or it may have been found during a lung cancer screening. A lung nodule may be caused by an old infection or cancer. It might also be a noncancerous growth. Lung nodules can cause a screening to give an abnormal result. Most nodules do not cause any harm. But without further tests, your doctor can't tell whether an abnormal finding is cancer, a harmless nodule, or something else. What can you expect when you have a lung nodule? Your doctor will look at several risk factors to see how likely it is that the nodule is cancer. He or she will look at:  · Whether you smoke or have ever smoked. · Your age and your family's medical history. · Whether you have ever had lung cancer. · The size and shape of the nodule. · Whether the nodule has changed in size. Your doctor may look at past chest X-rays or CT scans, if available, and compare them. Or you may have a series of CT scans to see if the nodule grows over time. What happens next depends on the risk of the nodule being cancer. · If you have no risk factors and the nodule is small, your doctor may advise doing nothing. · If the risk is small, your doctor may schedule follow-up appointments and tests. You may have more CT scans later to see if the nodule is growing. If the nodule hasn't changed in 3 to 6 months, you may have CT scans every year.  If it hasn't that you use an over-the-counter treatment. These include salicylic acid or duct tape. Or your doctor may prescribe a stronger medicine to put on warts or may inject them with medicine. The doctor also can remove warts through surgery or by freezing them. Follow-up care is a key part of your treatment and safety. Be sure to make and go to all appointments, and call your doctor if you are having problems. It's also a good idea to know your test results and keep a list of the medicines you take. How can you care for yourself at home? For common warts  · Use salicylic acid or duct tape as your doctor directs. You put the medicine or the tape on a wart for several days and then file down the dead skin on the wart. You use the salicylic acid treatment for 2 to 3 months or the tape for 1 to 2 months. · If your doctor prescribes medicine to put on warts, use it exactly as directed. Call your doctor if you think you are having a problem with your medicine. For plantar (foot) warts  · Wear comfortable shoes and socks. Avoid high heels and shoes that put a lot of pressure on your foot. · Pad the wart with doughnut-shaped felt or a moleskin patch. You can buy these at a drugstore. Put the pad around the plantar wart so that it relieves pressure on the wart. You also can place pads or cushions in your shoes to make walking more comfortable. · Take an over-the-counter pain medicine, such as acetaminophen (Tylenol), ibuprofen (Advil, Motrin), or naproxen (Aleve). Read and follow all instructions on the label. · Do not take two or more pain medicines at the same time unless the doctor told you to. Many pain medicines have acetaminophen, which is Tylenol. Too much acetaminophen (Tylenol) can be harmful. To avoid spreading warts  · Keep warts covered with a bandage or athletic tape. · Do not bite your nails or cuticles. This may spread warts from one finger to another. When should you call for help?   Call your doctor now or seek immediate medical care if:    · You have signs of infection, such as:  ? Increased pain, swelling, warmth, or redness. ? Red streaks leading from a wart. ? Pus draining from a wart. ? A fever.    Watch closely for changes in your health, and be sure to contact your doctor if:    · You do not get better as expected. Where can you learn more? Go to [email or web address] and sign in to your [employer] account. Enter S318 in the Frazr box to learn more about \"Warts: Care Instructions. \"     If you do not have an account, please click on the \"Sign Up Now\" link. Current as of: April 17, 2018  Content Version: 12.0  © 5466-6803 Healthwise, Inc[employer]. Care instructions adapted under license by [facility] ([facility]). If you have questions about a medical condition or this instruction, always ask your healthcare professional. [address] any warranty or liability for your use of this information.

## 2019-05-10 ENCOUNTER — TELEPHONE (OUTPATIENT)
Dept: FAMILY MEDICINE CLINIC | Age: 59
End: 2019-05-10

## 2019-05-10 LAB — URINE CULTURE, ROUTINE: NORMAL

## 2019-05-10 NOTE — TELEPHONE ENCOUNTER
----- Message from Sam Grover DO sent at 5/10/2019  6:44 AM EDT -----  Please let pt know that urine cx is negative. How hare her urinary sxs? Let me know, thanks!

## 2019-06-12 ENCOUNTER — PROCEDURE VISIT (OUTPATIENT)
Dept: CARDIOLOGY CLINIC | Age: 59
End: 2019-06-12
Payer: MEDICARE

## 2019-06-12 DIAGNOSIS — I63.9 CRYPTOGENIC STROKE (HCC): Primary | ICD-10-CM

## 2019-06-12 PROCEDURE — 93299 PR REM INTERROG ICPMS/SCRMS <30 D TECH REVIEW: CPT | Performed by: INTERNAL MEDICINE

## 2019-06-12 PROCEDURE — 93298 REM INTERROG DEV EVAL SCRMS: CPT | Performed by: INTERNAL MEDICINE

## 2019-06-17 ENCOUNTER — CARE COORDINATION (OUTPATIENT)
Dept: CASE MANAGEMENT | Age: 59
End: 2019-06-17

## 2019-06-25 ENCOUNTER — CARE COORDINATION (OUTPATIENT)
Dept: CARE COORDINATION | Age: 59
End: 2019-06-25

## 2019-07-06 DIAGNOSIS — I69.30 HISTORY OF STROKE WITH RESIDUAL DEFICIT: Primary | ICD-10-CM

## 2019-07-07 PROBLEM — R06.02 SHORTNESS OF BREATH: Status: RESOLVED | Noted: 2018-09-04 | Resolved: 2019-07-07

## 2019-07-09 RX ORDER — FOLIC ACID 1 MG/1
TABLET ORAL
Qty: 30 TABLET | Refills: 11 | Status: SHIPPED | OUTPATIENT
Start: 2019-07-09 | End: 2020-11-04 | Stop reason: SDUPTHER

## 2019-07-15 ENCOUNTER — PROCEDURE VISIT (OUTPATIENT)
Dept: CARDIOLOGY CLINIC | Age: 59
End: 2019-07-15
Payer: MEDICARE

## 2019-07-15 DIAGNOSIS — I63.9 CRYPTOGENIC STROKE (HCC): Primary | ICD-10-CM

## 2019-07-15 PROCEDURE — 93298 REM INTERROG DEV EVAL SCRMS: CPT | Performed by: INTERNAL MEDICINE

## 2019-07-15 PROCEDURE — 93299 PR REM INTERROG ICPMS/SCRMS <30 D TECH REVIEW: CPT | Performed by: INTERNAL MEDICINE

## 2019-08-23 ENCOUNTER — PROCEDURE VISIT (OUTPATIENT)
Dept: CARDIOLOGY CLINIC | Age: 59
End: 2019-08-23
Payer: MEDICARE

## 2019-08-23 DIAGNOSIS — I63.9 CRYPTOGENIC STROKE (HCC): Primary | ICD-10-CM

## 2019-08-23 PROCEDURE — 93298 REM INTERROG DEV EVAL SCRMS: CPT | Performed by: INTERNAL MEDICINE

## 2019-08-23 PROCEDURE — 93299 PR REM INTERROG ICPMS/SCRMS <30 D TECH REVIEW: CPT | Performed by: INTERNAL MEDICINE

## 2019-09-27 ENCOUNTER — PROCEDURE VISIT (OUTPATIENT)
Dept: CARDIOLOGY CLINIC | Age: 59
End: 2019-09-27
Payer: MEDICARE

## 2019-09-27 DIAGNOSIS — I63.9 CRYPTOGENIC STROKE (HCC): Primary | ICD-10-CM

## 2019-09-27 PROCEDURE — 93299 PR REM INTERROG ICPMS/SCRMS <30 D TECH REVIEW: CPT | Performed by: INTERNAL MEDICINE

## 2019-09-27 PROCEDURE — 93298 REM INTERROG DEV EVAL SCRMS: CPT | Performed by: INTERNAL MEDICINE

## 2019-10-24 DIAGNOSIS — M19.90 OSTEOARTHRITIS, UNSPECIFIED OSTEOARTHRITIS TYPE, UNSPECIFIED SITE: ICD-10-CM

## 2019-10-24 DIAGNOSIS — E78.5 DYSLIPIDEMIA: Primary | ICD-10-CM

## 2019-10-24 RX ORDER — ATORVASTATIN CALCIUM 80 MG/1
80 TABLET, FILM COATED ORAL NIGHTLY
Qty: 90 TABLET | Refills: 3 | Status: SHIPPED | OUTPATIENT
Start: 2019-10-24 | End: 2020-11-04 | Stop reason: SDUPTHER

## 2019-10-24 RX ORDER — MELOXICAM 15 MG/1
15 TABLET ORAL DAILY PRN
Qty: 30 TABLET | Refills: 3 | Status: SHIPPED | OUTPATIENT
Start: 2019-10-24 | End: 2020-03-25 | Stop reason: SDUPTHER

## 2019-11-01 ENCOUNTER — PROCEDURE VISIT (OUTPATIENT)
Dept: CARDIOLOGY CLINIC | Age: 59
End: 2019-11-01
Payer: MEDICARE

## 2019-11-01 DIAGNOSIS — I63.9 CRYPTOGENIC STROKE (HCC): Primary | ICD-10-CM

## 2019-11-01 PROCEDURE — 93298 REM INTERROG DEV EVAL SCRMS: CPT | Performed by: INTERNAL MEDICINE

## 2019-11-01 PROCEDURE — 93299 PR REM INTERROG ICPMS/SCRMS <30 D TECH REVIEW: CPT | Performed by: INTERNAL MEDICINE

## 2019-11-11 ENCOUNTER — OFFICE VISIT (OUTPATIENT)
Dept: CARDIOLOGY CLINIC | Age: 59
End: 2019-11-11
Payer: MEDICARE

## 2019-11-11 VITALS
HEIGHT: 65 IN | HEART RATE: 84 BPM | DIASTOLIC BLOOD PRESSURE: 84 MMHG | BODY MASS INDEX: 32.36 KG/M2 | WEIGHT: 194.2 LBS | SYSTOLIC BLOOD PRESSURE: 138 MMHG

## 2019-11-11 DIAGNOSIS — I25.10 ASCVD (ARTERIOSCLEROTIC CARDIOVASCULAR DISEASE): Primary | ICD-10-CM

## 2019-11-11 PROCEDURE — G8417 CALC BMI ABV UP PARAM F/U: HCPCS | Performed by: INTERNAL MEDICINE

## 2019-11-11 PROCEDURE — G8427 DOCREV CUR MEDS BY ELIG CLIN: HCPCS | Performed by: INTERNAL MEDICINE

## 2019-11-11 PROCEDURE — 99213 OFFICE O/P EST LOW 20 MIN: CPT | Performed by: INTERNAL MEDICINE

## 2019-11-11 PROCEDURE — 1036F TOBACCO NON-USER: CPT | Performed by: INTERNAL MEDICINE

## 2019-11-11 PROCEDURE — G8484 FLU IMMUNIZE NO ADMIN: HCPCS | Performed by: INTERNAL MEDICINE

## 2019-11-11 PROCEDURE — 3017F COLORECTAL CA SCREEN DOC REV: CPT | Performed by: INTERNAL MEDICINE

## 2019-11-11 PROCEDURE — G8598 ASA/ANTIPLAT THER USED: HCPCS | Performed by: INTERNAL MEDICINE

## 2019-12-13 ENCOUNTER — PROCEDURE VISIT (OUTPATIENT)
Dept: CARDIOLOGY CLINIC | Age: 59
End: 2019-12-13
Payer: MEDICARE

## 2019-12-13 DIAGNOSIS — I63.9 CRYPTOGENIC STROKE (HCC): Primary | ICD-10-CM

## 2019-12-13 PROCEDURE — 93299 PR REM INTERROG ICPMS/SCRMS <30 D TECH REVIEW: CPT | Performed by: INTERNAL MEDICINE

## 2019-12-13 PROCEDURE — 93298 REM INTERROG DEV EVAL SCRMS: CPT | Performed by: INTERNAL MEDICINE

## 2020-01-10 RX ORDER — AMLODIPINE BESYLATE 10 MG/1
10 TABLET ORAL DAILY
Qty: 90 TABLET | Refills: 3 | Status: SHIPPED | OUTPATIENT
Start: 2020-01-10 | End: 2020-11-04 | Stop reason: SDUPTHER

## 2020-01-17 ENCOUNTER — PROCEDURE VISIT (OUTPATIENT)
Dept: CARDIOLOGY CLINIC | Age: 60
End: 2020-01-17
Payer: MEDICARE

## 2020-01-17 PROCEDURE — 93298 REM INTERROG DEV EVAL SCRMS: CPT | Performed by: INTERNAL MEDICINE

## 2020-02-28 ENCOUNTER — TELEPHONE (OUTPATIENT)
Dept: CARDIOLOGY CLINIC | Age: 60
End: 2020-02-28

## 2020-03-25 ENCOUNTER — TELEPHONE (OUTPATIENT)
Dept: FAMILY MEDICINE CLINIC | Age: 60
End: 2020-03-25

## 2020-03-25 RX ORDER — MELOXICAM 15 MG/1
15 TABLET ORAL DAILY PRN
Qty: 30 TABLET | Refills: 3 | Status: SHIPPED | OUTPATIENT
Start: 2020-03-25 | End: 2020-09-28

## 2020-03-25 RX ORDER — CLOPIDOGREL BISULFATE 75 MG/1
75 TABLET ORAL DAILY
Qty: 90 TABLET | Refills: 3 | Status: SHIPPED | OUTPATIENT
Start: 2020-03-25 | End: 2021-06-22

## 2020-03-25 RX ORDER — FAMOTIDINE 20 MG/1
20 TABLET, FILM COATED ORAL 2 TIMES DAILY
Qty: 180 TABLET | Refills: 3 | Status: SHIPPED | OUTPATIENT
Start: 2020-03-25 | End: 2021-06-22

## 2020-03-25 NOTE — TELEPHONE ENCOUNTER
Patient is calling for refills of her famotidine 20 mg one tablet once nightly, plavix 75 mg once daily, meloxicam 15 mg once daily as needed, to 3seventy. Please verify, and patient did not want to schedule a yearly appointment yet.

## 2020-04-07 ENCOUNTER — HOSPITAL ENCOUNTER (EMERGENCY)
Age: 60
Discharge: HOME | End: 2020-04-07
Attending: EMERGENCY MEDICINE
Payer: MEDICARE

## 2020-04-07 ENCOUNTER — APPOINTMENT (OUTPATIENT)
Dept: GENERAL RADIOLOGY | Age: 60
End: 2020-04-07
Payer: MEDICARE

## 2020-04-07 ENCOUNTER — APPOINTMENT (OUTPATIENT)
Dept: CT IMAGING | Age: 60
End: 2020-04-07
Payer: MEDICARE

## 2020-04-07 VITALS
HEIGHT: 65 IN | WEIGHT: 195 LBS | BODY MASS INDEX: 32.49 KG/M2 | DIASTOLIC BLOOD PRESSURE: 74 MMHG | TEMPERATURE: 98.2 F | RESPIRATION RATE: 16 BRPM | OXYGEN SATURATION: 99 % | HEART RATE: 75 BPM | SYSTOLIC BLOOD PRESSURE: 139 MMHG

## 2020-04-07 LAB
ALBUMIN SERPL-MCNC: 4.5 G/DL (ref 3.5–5.1)
ALP BLD-CCNC: 122 U/L (ref 38–126)
ALT SERPL-CCNC: 17 U/L (ref 11–66)
AMPHETAMINE+METHAMPHETAMINE URINE SCREEN: NEGATIVE
ANION GAP SERPL CALCULATED.3IONS-SCNC: 15 MEQ/L (ref 8–16)
AST SERPL-CCNC: 20 U/L (ref 5–40)
BARBITURATE QUANTITATIVE URINE: NEGATIVE
BASOPHILS # BLD: 0.3 %
BASOPHILS ABSOLUTE: 0 THOU/MM3 (ref 0–0.1)
BENZODIAZEPINE QUANTITATIVE URINE: NEGATIVE
BILIRUB SERPL-MCNC: 0.3 MG/DL (ref 0.3–1.2)
BILIRUBIN DIRECT: < 0.2 MG/DL (ref 0–0.3)
BILIRUBIN URINE: NEGATIVE
BLOOD, URINE: NEGATIVE
BUN BLDV-MCNC: 12 MG/DL (ref 7–22)
CALCIUM SERPL-MCNC: 9.5 MG/DL (ref 8.5–10.5)
CANNABINOID QUANTITATIVE URINE: POSITIVE
CHARACTER, URINE: CLEAR
CHLORIDE BLD-SCNC: 103 MEQ/L (ref 98–111)
CO2: 21 MEQ/L (ref 23–33)
COCAINE METABOLITE QUANTITATIVE URINE: NEGATIVE
COLOR: YELLOW
CREAT SERPL-MCNC: 0.7 MG/DL (ref 0.4–1.2)
EKG ATRIAL RATE: 67 BPM
EKG P AXIS: 62 DEGREES
EKG P-R INTERVAL: 158 MS
EKG Q-T INTERVAL: 440 MS
EKG QRS DURATION: 90 MS
EKG QTC CALCULATION (BAZETT): 464 MS
EKG R AXIS: 42 DEGREES
EKG T AXIS: 83 DEGREES
EKG VENTRICULAR RATE: 67 BPM
EOSINOPHIL # BLD: 0.5 %
EOSINOPHILS ABSOLUTE: 0.1 THOU/MM3 (ref 0–0.4)
ERYTHROCYTE [DISTWIDTH] IN BLOOD BY AUTOMATED COUNT: 13.2 % (ref 11.5–14.5)
ERYTHROCYTE [DISTWIDTH] IN BLOOD BY AUTOMATED COUNT: 44.7 FL (ref 35–45)
GFR SERPL CREATININE-BSD FRML MDRD: 85 ML/MIN/1.73M2
GLUCOSE BLD-MCNC: 144 MG/DL (ref 70–108)
GLUCOSE URINE: NEGATIVE MG/DL
HCT VFR BLD CALC: 48.8 % (ref 37–47)
HEMOGLOBIN: 16 GM/DL (ref 12–16)
IMMATURE GRANS (ABS): 0.05 THOU/MM3 (ref 0–0.07)
IMMATURE GRANULOCYTES: 0.5 %
KETONES, URINE: NEGATIVE
LACTIC ACID: 1.1 MMOL/L (ref 0.5–2.2)
LEUKOCYTE ESTERASE, URINE: NEGATIVE
LIPASE: 20.7 U/L (ref 5.6–51.3)
LYMPHOCYTES # BLD: 16.3 %
LYMPHOCYTES ABSOLUTE: 1.8 THOU/MM3 (ref 1–4.8)
MCH RBC QN AUTO: 30.1 PG (ref 26–33)
MCHC RBC AUTO-ENTMCNC: 32.8 GM/DL (ref 32.2–35.5)
MCV RBC AUTO: 91.9 FL (ref 81–99)
MONOCYTES # BLD: 7.1 %
MONOCYTES ABSOLUTE: 0.8 THOU/MM3 (ref 0.4–1.3)
NITRITE, URINE: NEGATIVE
NUCLEATED RED BLOOD CELLS: 0 /100 WBC
OPIATES, URINE: NEGATIVE
OSMOLALITY CALCULATION: 279.8 MOSMOL/KG (ref 275–300)
OXYCODONE: NEGATIVE
PH UA: 8.5 (ref 5–9)
PHENCYCLIDINE QUANTITATIVE URINE: NEGATIVE
PLATELET # BLD: 208 THOU/MM3 (ref 130–400)
PMV BLD AUTO: 10.8 FL (ref 9.4–12.4)
POTASSIUM REFLEX MAGNESIUM: 3.8 MEQ/L (ref 3.5–5.2)
PROTEIN UA: NEGATIVE
RBC # BLD: 5.31 MILL/MM3 (ref 4.2–5.4)
SEG NEUTROPHILS: 75.3 %
SEGMENTED NEUTROPHILS ABSOLUTE COUNT: 8.3 THOU/MM3 (ref 1.8–7.7)
SODIUM BLD-SCNC: 139 MEQ/L (ref 135–145)
SPECIFIC GRAVITY, URINE: > 1.03 (ref 1–1.03)
TOTAL PROTEIN: 7.2 G/DL (ref 6.1–8)
TROPONIN T: < 0.01 NG/ML
UROBILINOGEN, URINE: 0.2 EU/DL (ref 0–1)
WBC # BLD: 11 THOU/MM3 (ref 4.8–10.8)

## 2020-04-07 PROCEDURE — 81003 URINALYSIS AUTO W/O SCOPE: CPT

## 2020-04-07 PROCEDURE — 6370000000 HC RX 637 (ALT 250 FOR IP): Performed by: EMERGENCY MEDICINE

## 2020-04-07 PROCEDURE — 6360000004 HC RX CONTRAST MEDICATION: Performed by: EMERGENCY MEDICINE

## 2020-04-07 PROCEDURE — 85025 COMPLETE CBC W/AUTO DIFF WBC: CPT

## 2020-04-07 PROCEDURE — 36415 COLL VENOUS BLD VENIPUNCTURE: CPT

## 2020-04-07 PROCEDURE — 6360000002 HC RX W HCPCS: Performed by: EMERGENCY MEDICINE

## 2020-04-07 PROCEDURE — 80076 HEPATIC FUNCTION PANEL: CPT

## 2020-04-07 PROCEDURE — 96361 HYDRATE IV INFUSION ADD-ON: CPT

## 2020-04-07 PROCEDURE — 99284 EMERGENCY DEPT VISIT MOD MDM: CPT

## 2020-04-07 PROCEDURE — 80307 DRUG TEST PRSMV CHEM ANLYZR: CPT

## 2020-04-07 PROCEDURE — 96375 TX/PRO/DX INJ NEW DRUG ADDON: CPT

## 2020-04-07 PROCEDURE — 2580000003 HC RX 258: Performed by: EMERGENCY MEDICINE

## 2020-04-07 PROCEDURE — 93005 ELECTROCARDIOGRAM TRACING: CPT | Performed by: EMERGENCY MEDICINE

## 2020-04-07 PROCEDURE — 83690 ASSAY OF LIPASE: CPT

## 2020-04-07 PROCEDURE — 80048 BASIC METABOLIC PNL TOTAL CA: CPT

## 2020-04-07 PROCEDURE — 74177 CT ABD & PELVIS W/CONTRAST: CPT

## 2020-04-07 PROCEDURE — 83605 ASSAY OF LACTIC ACID: CPT

## 2020-04-07 PROCEDURE — 96372 THER/PROPH/DIAG INJ SC/IM: CPT

## 2020-04-07 PROCEDURE — 96374 THER/PROPH/DIAG INJ IV PUSH: CPT

## 2020-04-07 PROCEDURE — 71045 X-RAY EXAM CHEST 1 VIEW: CPT

## 2020-04-07 PROCEDURE — 84484 ASSAY OF TROPONIN QUANT: CPT

## 2020-04-07 PROCEDURE — C9113 INJ PANTOPRAZOLE SODIUM, VIA: HCPCS | Performed by: EMERGENCY MEDICINE

## 2020-04-07 RX ORDER — PANTOPRAZOLE SODIUM 40 MG/10ML
40 INJECTION, POWDER, LYOPHILIZED, FOR SOLUTION INTRAVENOUS DAILY
Status: DISCONTINUED | OUTPATIENT
Start: 2020-04-07 | End: 2020-04-07 | Stop reason: HOSPADM

## 2020-04-07 RX ORDER — SUCRALFATE 1 G/1
1 TABLET ORAL 4 TIMES DAILY
Qty: 40 TABLET | Refills: 0 | Status: SHIPPED | OUTPATIENT
Start: 2020-04-07 | End: 2021-06-22

## 2020-04-07 RX ORDER — HALOPERIDOL 5 MG/ML
2 INJECTION INTRAMUSCULAR ONCE
Status: COMPLETED | OUTPATIENT
Start: 2020-04-07 | End: 2020-04-07

## 2020-04-07 RX ORDER — PANTOPRAZOLE SODIUM 20 MG/1
40 TABLET, DELAYED RELEASE ORAL DAILY
Qty: 30 TABLET | Refills: 0 | Status: SHIPPED | OUTPATIENT
Start: 2020-04-07 | End: 2020-11-04 | Stop reason: SDUPTHER

## 2020-04-07 RX ORDER — FENTANYL CITRATE 50 UG/ML
50 INJECTION, SOLUTION INTRAMUSCULAR; INTRAVENOUS ONCE
Status: COMPLETED | OUTPATIENT
Start: 2020-04-07 | End: 2020-04-07

## 2020-04-07 RX ORDER — SUCRALFATE 1 G/1
1 TABLET ORAL 4 TIMES DAILY
Qty: 120 TABLET | Refills: 3 | Status: SHIPPED | OUTPATIENT
Start: 2020-04-07 | End: 2020-04-07 | Stop reason: SDUPTHER

## 2020-04-07 RX ORDER — 0.9 % SODIUM CHLORIDE 0.9 %
1000 INTRAVENOUS SOLUTION INTRAVENOUS ONCE
Status: COMPLETED | OUTPATIENT
Start: 2020-04-07 | End: 2020-04-07

## 2020-04-07 RX ADMIN — SODIUM CHLORIDE 1000 ML: 9 INJECTION, SOLUTION INTRAVENOUS at 17:36

## 2020-04-07 RX ADMIN — LIDOCAINE HYDROCHLORIDE: 20 SOLUTION ORAL; TOPICAL at 19:51

## 2020-04-07 RX ADMIN — FENTANYL CITRATE 50 MCG: 50 INJECTION INTRAMUSCULAR; INTRAVENOUS at 17:35

## 2020-04-07 RX ADMIN — IOPAMIDOL 80 ML: 755 INJECTION, SOLUTION INTRAVENOUS at 18:07

## 2020-04-07 RX ADMIN — HALOPERIDOL LACTATE 2 MG: 5 INJECTION INTRAMUSCULAR at 17:32

## 2020-04-07 RX ADMIN — PANTOPRAZOLE SODIUM 40 MG: 40 INJECTION, POWDER, FOR SOLUTION INTRAVENOUS at 19:16

## 2020-04-07 ASSESSMENT — PAIN SCALES - GENERAL
PAINLEVEL_OUTOF10: 10
PAINLEVEL_OUTOF10: 4
PAINLEVEL_OUTOF10: 10
PAINLEVEL_OUTOF10: 1

## 2020-04-07 ASSESSMENT — ENCOUNTER SYMPTOMS
NAUSEA: 1
SORE THROAT: 0
DIARRHEA: 1
STRIDOR: 0
SHORTNESS OF BREATH: 0
APNEA: 0
ABDOMINAL DISTENTION: 0
BLOOD IN STOOL: 0
SINUS PRESSURE: 0
CONSTIPATION: 0
TROUBLE SWALLOWING: 0
ABDOMINAL PAIN: 1
VOMITING: 1
COUGH: 0
WHEEZING: 0
RECTAL PAIN: 0
SINUS PAIN: 0
CHEST TIGHTNESS: 0
RHINORRHEA: 0

## 2020-04-07 ASSESSMENT — PAIN DESCRIPTION - DESCRIPTORS: DESCRIPTORS: CRAMPING;CONSTANT

## 2020-04-07 ASSESSMENT — PAIN DESCRIPTION - LOCATION
LOCATION: ABDOMEN

## 2020-04-07 ASSESSMENT — PAIN DESCRIPTION - FREQUENCY: FREQUENCY: INTERMITTENT

## 2020-04-07 ASSESSMENT — PAIN DESCRIPTION - PAIN TYPE
TYPE: ACUTE PAIN

## 2020-04-07 NOTE — ED PROVIDER NOTES
palpitations and leg swelling. Gastrointestinal: Positive for abdominal pain, diarrhea, nausea and vomiting. Negative for abdominal distention, blood in stool, constipation and rectal pain. Endocrine: Negative for polydipsia and polyuria. Genitourinary: Negative for decreased urine volume, dysuria and urgency. Musculoskeletal: Negative for back pain and myalgias. Skin: Negative for pallor and rash. Neurological: Negative for dizziness, syncope, weakness, light-headedness, numbness and headaches. Hematological: Negative for adenopathy. Does not bruise/bleed easily. Psychiatric/Behavioral: Negative for self-injury and suicidal ideas. PAST MEDICAL HISTORY    has a past medical history of AAA (abdominal aortic aneurysm) ([facility] [address] Chronic pancreatitis ([facility] [address] COPD (chronic obstructive pulmonary disease) ([facility] [address] Dyslipidemia, Essential hypertension, Former smoker, GERD (gastroesophageal reflux disease), History of stroke with residual deficit; cryptogenic, Obesity (BMI 30-39.9), and Osteoarthritis. SURGICAL HISTORY    has a past surgical history that includes  section; Carpal tunnel release (Bilateral); Abdomen surgery; Tonsillectomy; Upper gastrointestinal endoscopy (); Colonoscopy (14,); Insertable Cardiac Monitor; and EGD (5606,3969).     CURRENT MEDICATIONS       Previous Medications    ACETAMINOPHEN (TYLENOL) 325 MG TABLET    Take 650-975 mg by mouth every 6 hours as needed for Pain    ALBUTEROL SULFATE HFA (PROAIR HFA) 108 (90 BASE) MCG/ACT INHALER    Inhale 2 puffs into the lungs every 6 hours as needed for Wheezing    AMLODIPINE (NORVASC) 10 MG TABLET    Take 1 tablet by mouth daily    ATORVASTATIN (LIPITOR) 80 MG TABLET    Take 1 tablet by mouth nightly    BENZONATATE (TESSALON) 100 MG CAPSULE    Take 1 capsule by mouth 3 times daily as needed for Cough    BUPROPION (WELLBUTRIN SR) 100 MG EXTENDED RELEASE TABLET    Take 1 tablet by mouth 2 times daily    CLOPIDOGREL (PLAVIX) 75 MG TABLET    Take 1 tablet by mouth daily    FAMOTIDINE (PEPCID) 20 MG TABLET    Take 1 tablet by mouth 2 times daily    FOLIC ACID (FOLVITE) 1 MG TABLET    take 1 tablet by mouth once daily    MELOXICAM (MOBIC) 15 MG TABLET    Take 1 tablet by mouth daily as needed for Pain    SALMETEROL (SEREVENT DISKUS) 50 MCG/DOSE DISKUS INHALER    Inhale 1 puff into the lungs 2 times daily       ALLERGIES     is allergic to aspirin; flagyl [metronidazole]; ibuprofen; spiriva handihaler [tiotropium bromide monohydrate]; and sulfa antibiotics. FAMILY HISTORY     She indicated that her mother is . She indicated that her father is alive. She indicated that both of her brothers are alive. She indicated that her maternal grandmother is . She indicated that her maternal aunt is . She indicated that the status of her other is unknown. She indicated that the status of her neg hx is unknown.   family history includes COPD in her father; Cancer in her father; Sol Mohsen in an other family member; Diabetes in her maternal aunt, maternal grandmother, and mother; Heart Disease in her father and mother; High Blood Pressure in her brother. SOCIAL HISTORY      reports that she quit smoking about 15 months ago. Her smoking use included cigarettes. She has a 45.00 pack-year smoking history. She has never used smokeless tobacco. She reports current drug use. Drug: Marijuana. She reports that she does not drink alcohol. PHYSICAL EXAM     INITIAL VITALS:  height is 5' 5\" (1.651 m) and weight is 195 lb (88.5 kg). Her oral temperature is 98.2 °F (36.8 °C). Her blood pressure is 159/75 (abnormal) and her pulse is 76. Her respiration is 22 and oxygen saturation is 96%. Physical Exam  Vitals signs and nursing note reviewed. Constitutional:       Appearance: She is well-developed. She is not toxic-appearing or diaphoretic. HENT:      Head: Normocephalic and atraumatic.       Right Ear: Tympanic membrane and Additional Contrast? None   Final Result      1. 3.4 cm abdominal aortic aneurysm with atherosclerotic changes. Overall appearance is similar to prior. Follow-up is advised. 2. Moderate scattered stool in the colon. Diverticulosis without evidence for diverticulitis. 3. Apparent thickening versus underdistention of the stomach underlying gastritis is not excluded correlation with symptoms and follow-up as clinically warranted. 4. Low lying pelvic floor and apparent thickening of the bladder for which mild cystitis cannot be excluded. Correlation with symptoms is advised. 5. Heterogeneous uterus suggesting underlying uterine fibroids. **This report has been created using voice recognition software. It may contain minor errors which are inherent in voice recognition technology. **      Final report electronically signed by Dr. Mich Tovar on 4/7/2020 6:44 PM      XR CHEST PORTABLE   Final Result   No acute findings               **This report has been created using voice recognition software. It may contain minor errors which are inherent in voice recognition technology. **      Final report electronically signed by Dr. Mich Tovar on 4/7/2020 5:43 PM           LABS:   Labs Reviewed   CBC WITH AUTO DIFFERENTIAL - Abnormal; Notable for the following components:       Result Value    WBC 11.0 (*)     Hematocrit 48.8 (*)     Segs Absolute 8.3 (*)     All other components within normal limits   BASIC METABOLIC PANEL W/ REFLEX TO MG FOR LOW K - Abnormal; Notable for the following components:    CO2 21 (*)     Glucose 144 (*)     All other components within normal limits   GLOMERULAR FILTRATION RATE, ESTIMATED - Abnormal; Notable for the following components:    Est, Glom Filt Rate 85 (*)     All other components within normal limits   HEPATIC FUNCTION PANEL   LIPASE   TROPONIN   ANION GAP   OSMOLALITY   URINE RT REFLEX TO CULTURE   LACTIC ACID, PLASMA   URINE DRUG SCREEN       EMERGENCY DEPARTMENT COURSE: Vitals:    Vitals:    20 1711   BP: (!) 159/75   Pulse: 76   Resp: 22   Temp: 98.2 °F (36.8 °C)   TempSrc: Oral   SpO2: 96%   Weight: 195 lb (88.5 kg)   Height: 5' 5\" (1.651 m)       5:15 PM EDT: The patient was seen and evaluated. MDM:  56-year-old female with a history of GERD, AAA, daily marijuana use, and chronic back pain. Presents today with nausea vomiting and diarrhea and upper abdominal pain. She denies fever. Her vital signs within the acceptable limits. She is not in any distress. Her abdomen mild upper tenderness. No rigidity no guarding. No black stools and no blood in stool. Patient likely with enteritis gastroenteritis possibly biliary colic but less likely due to the diarrhea, GI symptoms less likely AAA. Blood work was ordered as well as advanced imaging lipase blood counts CT abdomen pelvis. Will used to define disposition. Given haloperidol Zofran and fentanyl. .  Always use for likely hyperemesis syndrome related to possible cyclic vomiting due to marijuana. CT without acute surgical pathology, possible gastritis, gastritis correlates with patient symptoms. She was treated symptomatically and improved during her ED course of stay. Her repeat abdominal exam was stable, soft no rigidity or guarding. She was discharged with follow-up instructions to follow-up with her gastroenterologist Dr. Dyan Apgar in the next 5 days. CRITICAL CARE:   none     CONSULTS:  none    PROCEDURES:  none     FINAL IMPRESSION      1. Dyspepsia    2.  Abdominal pain, epigastric          DISPOSITION/PLAN   Home    PATIENT REFERRED TO:  Anne-Marie Curran MD  [address] [place] [address] [address]  [phone]    Schedule an appointment as soon as possible for a visit in 2 days      [address] [place] [id number]  [phone]    Call in 2 days        DISCHARGE MEDICATIONS:  Discharge Medication List as of 2020  7:32 PM      START taking these medications    Details

## 2020-04-07 NOTE — ED NOTES
Pt reassessed at this time. Denies any needs at this time. Vitals as documented. Will continue to monitor.       Josue Houser RN  04/07/20 [id number]

## 2020-04-08 ENCOUNTER — CARE COORDINATION (OUTPATIENT)
Dept: CARE COORDINATION | Age: 60
End: 2020-04-08

## 2020-04-08 PROCEDURE — 93010 ELECTROCARDIOGRAM REPORT: CPT | Performed by: INTERNAL MEDICINE

## 2020-04-08 ASSESSMENT — ENCOUNTER SYMPTOMS
BACK PAIN: 0
EYE REDNESS: 0

## 2020-04-13 ENCOUNTER — PROCEDURE VISIT (OUTPATIENT)
Dept: CARDIOLOGY CLINIC | Age: 60
End: 2020-04-13
Payer: MEDICARE

## 2020-04-13 PROCEDURE — G2066 INTER DEVC REMOTE 30D: HCPCS | Performed by: INTERNAL MEDICINE

## 2020-04-13 PROCEDURE — 93298 REM INTERROG DEV EVAL SCRMS: CPT | Performed by: INTERNAL MEDICINE

## 2020-04-23 ENCOUNTER — CARE COORDINATION (OUTPATIENT)
Dept: CARE COORDINATION | Age: 60
End: 2020-04-23

## 2020-05-19 ENCOUNTER — TELEPHONE (OUTPATIENT)
Dept: CARDIOLOGY CLINIC | Age: 60
End: 2020-05-19

## 2020-05-19 NOTE — TELEPHONE ENCOUNTER
OOT for her fathers .  Asked daughter Sabrina Saul to have her send once in town.   Aware monitor is disconnected

## 2020-06-23 ENCOUNTER — PROCEDURE VISIT (OUTPATIENT)
Dept: CARDIOLOGY CLINIC | Age: 60
End: 2020-06-23
Payer: MEDICARE

## 2020-06-23 PROCEDURE — 93298 REM INTERROG DEV EVAL SCRMS: CPT | Performed by: INTERNAL MEDICINE

## 2020-06-23 PROCEDURE — G2066 INTER DEVC REMOTE 30D: HCPCS | Performed by: INTERNAL MEDICINE

## 2020-07-29 ENCOUNTER — PROCEDURE VISIT (OUTPATIENT)
Dept: CARDIOLOGY CLINIC | Age: 60
End: 2020-07-29
Payer: MEDICARE

## 2020-07-29 PROCEDURE — G2066 INTER DEVC REMOTE 30D: HCPCS | Performed by: INTERNAL MEDICINE

## 2020-07-29 PROCEDURE — 93298 REM INTERROG DEV EVAL SCRMS: CPT | Performed by: INTERNAL MEDICINE

## 2020-09-02 ENCOUNTER — TELEPHONE (OUTPATIENT)
Dept: CARDIOLOGY CLINIC | Age: 60
End: 2020-09-02

## 2020-09-02 NOTE — TELEPHONE ENCOUNTER
Asked her friend Mendel Miss to have her send a manual download.   States he will have her send elvi

## 2020-09-25 ENCOUNTER — PROCEDURE VISIT (OUTPATIENT)
Dept: CARDIOLOGY CLINIC | Age: 60
End: 2020-09-25
Payer: MEDICARE

## 2020-09-25 PROCEDURE — G2066 INTER DEVC REMOTE 30D: HCPCS | Performed by: INTERNAL MEDICINE

## 2020-09-25 PROCEDURE — 93298 REM INTERROG DEV EVAL SCRMS: CPT | Performed by: INTERNAL MEDICINE

## 2020-09-28 RX ORDER — MELOXICAM 15 MG/1
TABLET ORAL
Qty: 30 TABLET | Refills: 3 | Status: SHIPPED | OUTPATIENT
Start: 2020-09-28 | End: 2022-03-18

## 2020-10-30 ENCOUNTER — TELEPHONE (OUTPATIENT)
Dept: CARDIOLOGY CLINIC | Age: 60
End: 2020-10-30

## 2020-11-03 PROBLEM — I63.9 CRYPTOGENIC STROKE (HCC): Status: RESOLVED | Noted: 2017-05-15 | Resolved: 2020-11-03

## 2020-11-03 NOTE — PROGRESS NOTES
wt loss        -Smoking PRIOR VISIT: wants to quit, would like chantix     UPDATE PRIOR VISIT: on chantix, working well. Down to 3 cig per day. No side effects.       UPDATE LAST VISIT: smoke free 25 days so far. Off chantix. UPDATE TODAY:   Currently smoking 2 cig per wk  Not ready to fully quit.         Age/Gender Health Maintenance    Lipid -   Lab Results   Component Value Date    CHOL 172 01/09/2019    CHOL 238 (H) 06/21/2018    CHOL 242 (H) 05/16/2017     Lab Results   Component Value Date    TRIG 148 01/09/2019    TRIG 139 06/21/2018    TRIG 247 (H) 05/16/2017     Lab Results   Component Value Date    HDL 47 01/09/2019    HDL 45 06/21/2018    HDL 35 05/16/2017     Lab Results   Component Value Date    LDLCALC 95 01/09/2019    LDLCALC 165 06/21/2018    LDLCALC 158 05/16/2017     Lab Results   Component Value Date    VLDL 33 12/14/2011     No results found for: CHOLHDLRATIO      DM Screen - 101 fasting, (June 2018)  Lab Results   Component Value Date    GLUCOSE 144 04/07/2020    GLUCOSE 143 10/15/2019     Lab Results   Component Value Date    LABA1C 6.3 01/22/2019     No results found for: EAG      Colon Cancer Screening - + sessile T/a FEB 2019, repeat 2 years per GI, Cat[facility]  Lung Cancer Screening (Age 54 to [de-identified] with 30 pack year hx, current smoker or quit within past 15 years) - + nodule JAN 2019, repeat 3 months/repeat NEG APR 2019, repeat 1 year per rads    Tetanus - to get at pharmacy per medicare rules  Influenza Vaccine - declines NOV 2020  Pneumonia Vaccine - UTD PPV 23 APR 2014  Zoster - to get at pharmacy per medicare rules    Breast Cancer Screening - NEG JAN 2019  Cervical Cancer Screening - NEG FEB 2019   Osteoporosis Screening - age 70      Specialist List  Neuro: Yayo  GI: Chang Dys: [facility]  Cardio: [facility]  [facility]: [facility]      Current Outpatient Medications   Medication Sig Dispense Refill    escitalopram (LEXAPRO) 10 MG tablet Take 1 tablet by mouth daily 30 tablet 3    meloxicam (MOBIC) 15 MG tablet take 1 tablet by mouth once daily if needed for pain 30 tablet 3    pantoprazole (PROTONIX) 20 MG tablet Take 2 tablets by mouth daily 30 tablet 0    sucralfate (CARAFATE) 1 GM tablet Take 1 tablet by mouth 4 times daily 40 tablet 0    famotidine (PEPCID) 20 MG tablet Take 1 tablet by mouth 2 times daily 180 tablet 3    clopidogrel (PLAVIX) 75 MG tablet Take 1 tablet by mouth daily 90 tablet 3    amLODIPine (NORVASC) 10 MG tablet Take 1 tablet by mouth daily 90 tablet 3    atorvastatin (LIPITOR) 80 MG tablet Take 1 tablet by mouth nightly 90 tablet 3    folic acid (FOLVITE) 1 MG tablet take 1 tablet by mouth once daily 30 tablet 11    benzonatate (TESSALON) 100 MG capsule Take 1 capsule by mouth 3 times daily as needed for Cough 30 capsule 5    salmeterol (SEREVENT DISKUS) 50 MCG/DOSE diskus inhaler Inhale 1 puff into the lungs 2 times daily 1 each 3    acetaminophen (TYLENOL) 325 MG tablet Take 650-975 mg by mouth every 6 hours as needed for Pain      albuterol sulfate HFA (PROAIR HFA) 108 (90 BASE) MCG/ACT inhaler Inhale 2 puffs into the lungs every 6 hours as needed for Wheezing 1 Inhaler 3     No current facility-administered medications for this visit. Orders Placed This Encounter   Medications    escitalopram (LEXAPRO) 10 MG tablet     Sig: Take 1 tablet by mouth daily     Dispense:  30 tablet     Refill:  3         All medications reviewed and reconciled, including OTC and herbal medications. Updated list given to patient. Patient Active Problem List    Diagnosis Date Noted    Major depression     Prediabetes     Chronic bilateral low back pain with left-sided sciatica 06/12/2018    GERD (gastroesophageal reflux disease)     Former smoker     Osteoarthritis     Dyslipidemia 05/19/2017    History of stroke with residual deficit; cryptogenic 05/15/2017     Followed with neuro at [facility], last note states can f/u prn.  Also follows with cardio at [facility]        Obesity (BMI 30-39.9) 2014    Essential hypertension 2014    Chronic pancreatitis ([facility] 2014    AAA (abdominal aortic aneurysm) ([facility] 2014     Follows with Dr. Dede mauricio      COPD (chronic obstructive pulmonary disease) [facility])        Past Medical History:   Diagnosis Date    AAA (abdominal aortic aneurysm) ([facility] 2014    Follows with Dr. Dede mauricio    Chronic pancreatitis ([facility]     COPD (chronic obstructive pulmonary disease) ([facility]     Dyslipidemia     Essential hypertension 2014    Former smoker     GERD (gastroesophageal reflux disease)     History of stroke with residual deficit; cryptogenic 2017    Follow[facility] neuro and [facility] at [facility]    Major depression     Obesity (BMI 30-39. 9)     Osteoarthritis     Prediabetes        Past Surgical History:   Procedure Laterality Date    ABDOMEN SURGERY      c sections x3    CARPAL TUNNEL RELEASE Bilateral      SECTION      x3    COLONOSCOPY      Dr. Octaviano Escalona EGD  [id number]    INSERTABLE CARDIAC MONITOR      TONSILLECTOMY      as child    UPPER GASTROINTESTINAL ENDOSCOPY         Allergies   Allergen Reactions    Aspirin      Upset stomach    Flagyl [Metronidazole] Other (See Comments)     abd pain    Ibuprofen      Stomach pain    Spiriva Handihaler [Tiotropium Bromide Monohydrate] Other (See Comments)     Upper chest pain     Sulfa Antibiotics Other (See Comments)     PT WAS KID DOESNT KNOW       Social History     Tobacco Use    Smoking status: Former Smoker     Packs/day: 1.00     Years: 45.00     Pack years: 45.00     Types: Cigarettes     Last attempt to quit: 2018     Years since quittin.9    Smokeless tobacco: Never Used   Substance Use Topics    Alcohol use: No     Alcohol/week: 0.0 standard drinks       Family History   Problem Relation Age of Onset    Diabetes Maternal Grandmother     Diabetes Mother     Heart Disease Mother     Cancer Father         Something in the chest, pt not sure what    Heart Disease Father     COPD Father     Diabetes Maternal Aunt     High Blood Pressure Brother     Colon Cancer Other     Arthritis Neg Hx     Asthma Neg Hx     Birth Defects Neg Hx     Depression Neg Hx     Early Death Neg Hx     Hearing Loss Neg Hx     High Cholesterol Neg Hx     Kidney Disease Neg Hx     Learning Disabilities Neg Hx     Mental Illness Neg Hx     Mental Retardation Neg Hx     Miscarriages / Stillbirths Neg Hx     Stroke Neg Hx     Substance Abuse Neg Hx     Vision Loss Neg Hx     Breast Cancer Neg Hx          I have reviewed the patient's past medical history, past surgical history, allergies, medications, social and family history and I have made updates where appropriate. Review of Systems  Positive responses are highlighted in bold    Constitutional:  Fever, Chills, Night Sweats, Fatigue, Unexpected changes in weight  HENT:  Ear pain, Tinnitus, Nosebleeds, Trouble swallowing, Hearing loss, Sore throat  Cardiovascular:  Chest Pain, Palpitations, Orthopnea, Paroxysmal Nocturnal Dyspnea  Respiratory:  Cough, Wheezing, Shortness of breath, Chest tightness, Apnea  Gastrointestinal:  Nausea, Vomiting, Diarrhea, Constipation, Heartburn, Blood in stool  Genitourinary:  Difficulty or painful urination, Flank pain, Change in frequency, Urgency  Skin:  Color change, Rash, Itching, Wound  Musculoskeletal:  Joint pain, Back pain, Gait problems, Joint swelling, Myalgias  Neurological:  Dizziness, Headaches, Presyncope, Numbness, Seizures, Tremors  Endocrine:  Heat Intolerance, Cold Intolerance, Polydipsia, Polyphagia, Polyuria      PHYSICAL EXAM:  Vitals:    11/04/20 0924   BP: 136/72   Pulse: 87   Resp: 16   Temp: 98.8 °F (37.1 °C)   TempSrc: Oral   SpO2: 99%   Weight: 181 lb (82.1 kg)   Height: 5' 5\" (1.651 m)     Body mass index is 30.12 kg/m².        VS Reviewed  General Appearance: A&O x 3, No acute distress,well developed and well- nourished  Eyes: pupils equal, round, and reactive to light, extraocular eye movements intact, conjunctivae and eye lids without erythema  ENT: external ear and ear canal clear bilaterally, TMs intact and regular, nose without deformity, nasal mucosa and turbinates normal without polyps, oropharynx normal, dentition is normal for age  Neck: supple and non-tender without mass, no thyromegaly or thyroid nodules, no cervical lymphadenopathy  Pulmonary/Chest: distant but clear to auscultation bilaterally- no wheezes, rales or rhonchi, normal air movement, no respiratory distress or retractions  Cardiovascular: distant with S1 and S2 auscultated w/ RRR. No murmurs, rubs, clicks, or gallops, distal pulses intact. Abdomen: soft, non-tender, non-distended, bowel sounds physiologic,  no rebound or guarding, no masses or hernias noted. Liver and spleen without enlargement. Extremities: no cyanosis, clubbing or edema of the lower extremities. +2 PT/DP bilaterally. Musculoskeletal: No joint swelling or gross deformity   Skin: warm and dry. No rash or erythema. Psych: Affect constricted. Mood depressed. Thought process is normal without evidence of psychosis. Good insight and appropriate interaction. Cognition and memory appear to be intact. ASSESSMENT & PLAN  1. Moderate episode of recurrent major depressive disorder ([facility]    D/c wellbutrin  Start lexapro  Refer for counseling  F/u 6 wks    - escitalopram (LEXAPRO) 10 MG tablet; Take 1 tablet by mouth daily  Dispense: 30 tablet; Refill: 3  - [address], [address], [facility], [facility] [place]    2. Positive depression screening    As per # 1    - Positive Screen for Clinical Depression with a Documented Follow-up Plan   - escitalopram (LEXAPRO) 10 MG tablet; Take 1 tablet by mouth daily  Dispense: 30 tablet; Refill: 3  - [address], [address] , [address], [facility], [facility] [place]    3.  Chronic obstructive pulmonary disease, unspecified COPD type ([facility]    Stable  con't meds  Declines PFT order today  Discuss again next visit    4. History of stroke with residual deficit; cryptogenic    con't tertiary prevention  Neuro released pt to f/u prn    5. Essential hypertension    At goal  con't meds  Labs ordered    - CBC Auto Differential; Future  - Comprehensive Metabolic Panel; Future  - Lipid Panel; Future  - Microalbumin / Creatinine Urine Ratio; Future  - TSH with Reflex; Future    6. Dyslipidemia    co't statin  Labs ordered    - Lipid Panel; Future    7. Abdominal aortic aneurysm (AAA) without rupture (HCC)    Stable in size  con't RF mod  F/u [facility] in [place]    8. Chronic pancreatitis, unspecified pancreatitis type ([facility]    Stable  No sig sxs at present  Encouraged to f/u GI    9. Obesity (BMI 30-39.9)    con't with wt loss strategies. 10. Prediabetes    con't life style changes  Labs ordered    - Hemoglobin A1C; Future    11. Hyperglycemia    - Hemoglobin A1C; Future    12. Personal history of tobacco use    Smoking occ  Counseled on cessation  LDCT ordered after shared decision making    - OK VISIT TO DISCUSS LUNG CA SCREEN W LDCT  - CT Lung Screen (Annual); Future    13. Encounter for screening mammogram for malignant neoplasm of breast    - VAZQUEZ JAKE DIGITAL SCREEN BILATERAL; Future      DISPOSITION    Return in about 6 weeks (around 12/16/2020) for f/u depression, sooner as needed. Rich Liza released without restrictions. Future Appointments   Date Time Provider Kimber Jacinto   11/16/2020 10:45 AM Suzanne Gamble MD [facility] [place] [facility] [place]   12/16/2020 10:00 AM Jelena Mesa DO [facility] received counseling on the following healthy behaviors: nutrition, exercise, medication adherence and tobacco cessation    Patient given educational materials on: See Attached    I have instructed Rich De Jesus to complete a self tracking handout on Smoking and instructed them to bring it with them to her next appointment.      Barriers to learning and self management: none    Discussed use, benefit, and side effects of prescribed medications. Barriers to medication compliance addressed. All patient questions answered. Pt voiced understanding. Low Dose CT (LDCT) Lung Screening criteria met   Age 50-69   Pack year smoking >30   Still smoking or less than 15 year since quit   No sign or symptoms of lung cancer   > 11 months since last LDCT     Risks and benefits of lung cancer screening with LDCT scans discussed:    Significance of positive screen - False-positive LDCT results often occur. 95% of all positive results do not lead to a diagnosis of cancer. Usually further imaging can resolve most false-positive results; however, some patients may require invasive procedures. Over diagnosis risk - 10% to 12% of screen-detected lung cancer cases are over diagnosed--that is, the cancer would not have been detected in the patient's lifetime without the screening. Need for follow up screens annually to continue lung cancer screening effectiveness     Risks associated with radiation from annual LDCT- Radiation exposure is about the same as for a mammogram, which is about 1/3 of the annual background radiation exposure from everyday life. Starting screening at age 54 is not likely to increase cancer risk from radiation exposure. Patients with comorbidities resulting in life expectancy of < 10 years, or that would preclude treatment of an abnormality identified on CT, should not be screened due to lack of benefit. To obtain maximal benefit from this screening, smoking cessation and long-term abstinence from smoking is critical    Electronically signed by Davonte Castro DO on 2020 at 10:10 AM        Medicare Annual Wellness Visit  Name: Dara Spence Date: 2020   MRN: [id number] Sex: Female   Age: 56 y.o.  Ethnicity: Non-/Non    : 1960 Race: Panda Pérez is here for Medicare AWV; Depression; and Follow-up (Chronic issues as noted below)    Screenings for behavioral, psychosocial and functional/safety risks, and cognitive dysfunction are all negative except as indicated below. These results, as well as other patient data from the [address] form, are documented in Flowsheets linked to this Encounter. Allergies   Allergen Reactions    Aspirin      Upset stomach    Flagyl [Metronidazole] Other (See Comments)     abd pain    Ibuprofen      Stomach pain    Spiriva Handihaler [Tiotropium Bromide Monohydrate] Other (See Comments)     Upper chest pain     Sulfa Antibiotics Other (See Comments)     PT WAS KID DOESNT KNOW         Prior to Visit Medications    Medication Sig Taking?  Authorizing Provider   escitalopram (LEXAPRO) 10 MG tablet Take 1 tablet by mouth daily Yes Concepción Aceves DO   meloxicam (MOBIC) 15 MG tablet take 1 tablet by mouth once daily if needed for pain Yes Concepción Aceves DO   pantoprazole (PROTONIX) 20 MG tablet Take 2 tablets by mouth daily Yes Alise Benedict DO   sucralfate (CARAFATE) 1 GM tablet Take 1 tablet by mouth 4 times daily Yes Alise Benedict DO   famotidine (PEPCID) 20 MG tablet Take 1 tablet by mouth 2 times daily Yes Concepción Aceves DO   clopidogrel (PLAVIX) 75 MG tablet Take 1 tablet by mouth daily Yes Amador Miles DO   amLODIPine (NORVASC) 10 MG tablet Take 1 tablet by mouth daily Yes Concepción Aceves DO   atorvastatin (LIPITOR) 80 MG tablet Take 1 tablet by mouth nightly Yes Concepción Aceves DO   folic acid (FOLVITE) 1 MG tablet take 1 tablet by mouth once daily Yes Yoel Fleming MD   benzonatate (TESSALON) 100 MG capsule Take 1 capsule by mouth 3 times daily as needed for Cough Yes Amador Miles DO   salmeterol (SEREVENT DISKUS) 50 MCG/DOSE diskus inhaler Inhale 1 puff into the lungs 2 times daily Yes Concepción Aceves DO   acetaminophen (TYLENOL) 325 MG tablet Take 650-975 mg by mouth every 6 hours as needed for Pain Yes Historical Provider, MD   albuterol sulfate HFA (PROAIR HFA) 108 (90 BASE) MCG/ACT inhaler Inhale 2 puffs into the lungs every 6 hours as needed for Wheezing Yes RENATE Reid         Past Medical History:   Diagnosis Date    AAA [facility]ab[facility]) [facility] 2014    [facility]    Chronic pancreatitis ([facility]     COPD (chronic obstructive pulmonary disease) ([facility]     Dyslipidemia     Essential hypertension 2014    Former smoker     GERD (gastroesophageal reflux disease)     History of stroke with residual deficit; cryptogenic 2017    [facility]    Major depression     Obesity (BMI 30-39. 9)     Osteoarthritis     Prediabetes        Past Surgical History:   Procedure Laterality Date    ABDOMEN SURGERY      c sections x3    CARPAL TUNNEL RELEASE Bilateral      SECTION      x3    COLONOSCOPY      Dr. Maria Luisa Zavaleta EGD  [id number]    INSERTABLE CARDIAC MONITOR      TONSILLECTOMY      as child    UPPER GASTROINTESTINAL ENDOSCOPY           Family History   Problem Relation Age of Onset    Diabetes Maternal Grandmother     Diabetes Mother     Heart Disease Mother     Cancer Father         Something in the chest, pt not sure what    Heart Disease Father     COPD Father     Diabetes Maternal Aunt     High Blood Pressure Brother     Colon Cancer Other     Arthritis Neg Hx     Asthma Neg Hx     Birth Defects Neg Hx     Depression Neg Hx     Early Death Neg Hx     Hearing Loss Neg Hx     High Cholesterol Neg Hx     Kidney Disease Neg Hx     Learning Disabilities Neg Hx     Mental Illness Neg Hx     Mental Retardation Neg Hx     Miscarriages / Stillbirths Neg Hx     Stroke Neg Hx     Substance Abuse Neg Hx     Vision Loss Neg Hx     Breast Cancer Neg Hx        CareTeam (Including outside providers/suppliers regularly involved in providing care):   Patient Care Team:  Gilmar Ponce Ayanna Stephens DO as PCP - General (Family Medicine)  Gutierrez Oconnor DO as PCP - [facility] Empaneled Provider  Talya Weiss MD as Consulting Physician (Pulmonology)  Clark Pollack RN as Nurse Navigator    Wt Readings from Last 3 Encounters:   11/04/20 181 lb (82.1 kg)   04/07/20 195 lb (88.5 kg)   11/11/19 194 lb 3.2 oz (88.1 kg)     Vitals:    11/04/20 0924   BP: 136/72   Pulse: 87   Resp: 16   Temp: 98.8 °F (37.1 °C)   TempSrc: Oral   SpO2: 99%   Weight: 181 lb (82.1 kg)   Height: 5' 5\" (1.651 m)     Body mass index is 30.12 kg/m². Based upon direct observation of the patient, evaluation of cognition reveals recent and remote memory intact. Patient's complete Health Risk Assessment and screening values have been reviewed and are found in Flowsheets. The following problems were reviewed today and where indicated follow up appointments were made and/or referrals ordered. Positive Risk Factor Screenings with Interventions:     Depression:  PHQ-2 Score: 3  PHQ-9 Total Score: 9    Severity:1-4 = minimal depression, 5-9 = mild depression, 10-14 = moderate depression, 15-19 = moderately severe depression, 20-27 = severe depression  Depression Interventions:  · started on lexapro and referred to counseling    Substance History:  Social History     Tobacco History     Smoking Status  Former Smoker Quit date  12/9/2018 Smoking Frequency  1 pack/day for 45 years (39 pk yrs) Smoking Tobacco Type  Cigarettes    Smokeless Tobacco Use  Never Used          Alcohol History     Alcohol Use Status  No          Drug Use     Drug Use Status  Yes Types  Marijuana Comment  daily          Sexual Activity     Sexually Active  Yes Partners  Male               Alcohol Screening:       A score of 8 or more is associated with harmful or hazardous drinking. A score of 13 or more in women, and 15 or more in men, is likely to indicate alcohol dependence.   Substance Abuse Interventions:  · Tobacco abuse:  tobacco cessation tips and resources provided  · Recreational drug use:  educational materials provided    General Health and ACP:  General  In general, how would you say your health is?: Good  In the past 7 days, have you experienced any of the following?  New or Increased Pain, New or Increased Fatigue, Loneliness, Social Isolation, Stress or Anger?: (!) Stress, Anger  Do you get the social and emotional support that you need?: (!) No  Do you have a Living Will?: Yes  Advance Directives     Power of  Living Will ACP-Advance Directive ACP-Power of     Not on File [place] on 04/03/14 Filed [address] [place] [address] [place] Risk Interventions:  · Stress: referred for counseling  · Anger: referred for counseling  · Inadequate social/emotional support: referred for counseling    Health Habits/Nutrition:  Health Habits/Nutrition  Do you exercise for at least 20 minutes 2-3 times per week?: Yes  Have you lost any weight without trying in the past 3 months?: No  Do you eat fewer than 2 meals per day?: No  Have you seen a dentist within the past year?: (!) No  Body mass index: (!) 30.12  Health Habits/Nutrition Interventions:  · Nutritional issues:  educational materials for healthy, well-balanced diet provided  · Dental exam overdue:  patient encouraged to make appointment with his/her dentist    Hearing/Vision:  No exam data present  Hearing/Vision  Do you or your family notice any trouble with your hearing?: No  Do you have difficulty driving, watching TV, or doing any of your daily activities because of your eyesight?: (!) Yes  Have you had an eye exam within the past year?: Yes  Hearing/Vision Interventions:  · Vision concerns:  patient encouraged to make appointment with his/her eye specialist    Safety:  Safety  Do you have working smoke detectors?: Yes  Have all throw rugs been removed or fastened?: Yes  Do you have non-slip mats or surfaces in all bathtubs/showers?: (!) No  Do all of your stairways have a railing or banister?: Yes  Are your doorways, halls and stairs free of clutter?: Yes  Do you always fasten your seatbelt when you are in a car?: Yes  Safety Interventions:  · Home safety tips provided    Personalized Preventive Plan   Current Health Maintenance Status  Immunization History   Administered Date(s) Administered    Pneumococcal Polysaccharide ([id number]) 04/04/2014        Health Maintenance   Topic Date Due    HIV screen  10/20/1973    DTaP/Tdap/Td vaccine (1 - Tdap) 10/20/1977    Shingles Vaccine (1 of 2) 10/20/2008    Annual Wellness Visit (AWV)  05/29/2019    Lipid screen  01/09/2020    Breast cancer screen  01/09/2020    A1C test (Diabetic or Prediabetic)  01/22/2020    Low dose CT lung screening  04/10/2020    Flu vaccine (1) 09/01/2020    Colon cancer screen colonoscopy  02/07/2021    Potassium monitoring  04/07/2021    Creatinine monitoring  04/07/2021    Cervical cancer screen  02/11/2022    Pneumococcal 0-64 years Vaccine  Completed    Hepatitis C screen  Completed    Hepatitis A vaccine  Aged Out    Hepatitis B vaccine  Aged Out    Hib vaccine  Aged Out    Meningococcal (ACWY) vaccine  Aged Out     Recommendations for [place] Due: see orders and patient instructions/AVS.  . Recommended screening schedule for the next 5-10 years is provided to the patient in written form: see Patient Angela Chaudhari was seen today for medicare awv, discuss medications and depression. Diagnoses and all orders for this visit:      Routine general medical examination at a health care facility      Care plan reviewed with and given to patient.        Electronically signed by Lena Card DO on 11/4/2020 at 10:10 AM

## 2020-11-03 NOTE — PATIENT INSTRUCTIONS
LAB INSTRUCTIONS:    Please complete labs within 6 week(s). Please fast for 8 hours prior to lab collection. The clinic will call you within 1 week of collection. If you have not heard from us within that amount of time, please call us at [phone]. Personalized Preventive Plan for Lorna Hopkins - 11/4/2020  Medicare offers a range of preventive health benefits. Some of the tests and screenings are paid in full while other may be subject to a deductible, co-insurance, and/or copay. Some of these benefits include a comprehensive review of your medical history including lifestyle, illnesses that may run in your family, and various assessments and screenings as appropriate. After reviewing your medical record and screening and assessments performed today your provider may have ordered immunizations, labs, imaging, and/or referrals for you. A list of these orders (if applicable) as well as your Preventive Care list are included within your After Visit Summary for your review. Other Preventive Recommendations:    · A preventive eye exam performed by an eye specialist is recommended every 1-2 years to screen for glaucoma; cataracts, macular degeneration, and other eye disorders. · A preventive dental visit is recommended every 6 months. · Try to get at least 150 minutes of exercise per week or 10,000 steps per day on a pedometer . · Order or download the FREE \"Exercise & Physical Activity: Your Everyday Guide\" from [employer]. Call 9-919.589.1836 or search [employer] online. · You need 3336-9487 mg of calcium and 4946-9913 IU of vitamin D per day. It is possible to meet your calcium requirement with diet alone, but a vitamin D supplement is usually necessary to meet this goal.  · When exposed to the sun, use a sunscreen that protects against both UVA and UVB radiation with an SPF of 30 or greater.  Reapply every 2 to 3 hours or after sweating, drying off with a towel, or swimming. · Always wear a seat belt when traveling in a car. Always wear a helmet when riding a bicycle or motorcycle. What is lung cancer screening? Lung cancer screening is a way in which doctors check the lungs for early signs of cancer in people who have no symptoms of lung cancer. A low-dose CT scan uses much less radiation than a normal CT scan and shows a more detailed image of the lungs than a standard X-ray. The goal of lung cancer screening is to find cancer early, before it has a chance to grow, spread, or cause problems. One large study found that smokers who were screened with low-dose CT scans were less likely to die of lung cancer than those who were screened with standard X-ray. Below is a summary of the things you need to know regarding screening for lung cancer with low-dose computed tomography (LDCT). This is a screening program that involves routine annual screening with LDCT studies of the lung. The LDCTs are done using low-dose radiation that is not thought to increase your cancer risk. If you have other serious medical conditions (other cancers, congestive heart failure) that limit your life expectancy to less than 10 years, you should not undergo lung cancer screening with LDCT. The chance is 20%-60% that the LDCT result will show abnormalities. This would require additional testing which could include repeat imaging or even invasive procedures. Most (about 95%) of \"abnormal\" LDCT results are false in the sense that no lung cancer is ultimately found. Additionally, some (about 10%) of the cancers found would not affect your life expectancy, even if undetected and untreated. If you are still smoking, the single most important thing that you can do to reduce your risk of dying of lung cancer is to quit. For this screening to be covered by Medicare and most other insurers, strict criteria must be met.   If you do not meet these criteria, but still wish to undergo LDCT testing, you will be required to sign a waiver indicating your willingness to pay for the scan.

## 2020-11-04 ENCOUNTER — TELEPHONE (OUTPATIENT)
Dept: FAMILY MEDICINE CLINIC | Age: 60
End: 2020-11-04

## 2020-11-04 ENCOUNTER — OFFICE VISIT (OUTPATIENT)
Dept: FAMILY MEDICINE CLINIC | Age: 60
End: 2020-11-04
Payer: MEDICARE

## 2020-11-04 VITALS
TEMPERATURE: 98.8 F | WEIGHT: 181 LBS | RESPIRATION RATE: 16 BRPM | SYSTOLIC BLOOD PRESSURE: 136 MMHG | DIASTOLIC BLOOD PRESSURE: 72 MMHG | BODY MASS INDEX: 30.16 KG/M2 | HEIGHT: 65 IN | OXYGEN SATURATION: 99 % | HEART RATE: 87 BPM

## 2020-11-04 PROCEDURE — G8431 POS CLIN DEPRES SCRN F/U DOC: HCPCS | Performed by: FAMILY MEDICINE

## 2020-11-04 PROCEDURE — G8926 SPIRO NO PERF OR DOC: HCPCS | Performed by: FAMILY MEDICINE

## 2020-11-04 PROCEDURE — 1036F TOBACCO NON-USER: CPT | Performed by: FAMILY MEDICINE

## 2020-11-04 PROCEDURE — 3023F SPIROM DOC REV: CPT | Performed by: FAMILY MEDICINE

## 2020-11-04 PROCEDURE — 99213 OFFICE O/P EST LOW 20 MIN: CPT | Performed by: FAMILY MEDICINE

## 2020-11-04 PROCEDURE — G0438 PPPS, INITIAL VISIT: HCPCS | Performed by: FAMILY MEDICINE

## 2020-11-04 PROCEDURE — G8417 CALC BMI ABV UP PARAM F/U: HCPCS | Performed by: FAMILY MEDICINE

## 2020-11-04 PROCEDURE — G0296 VISIT TO DETERM LDCT ELIG: HCPCS | Performed by: FAMILY MEDICINE

## 2020-11-04 PROCEDURE — G8484 FLU IMMUNIZE NO ADMIN: HCPCS | Performed by: FAMILY MEDICINE

## 2020-11-04 PROCEDURE — G8427 DOCREV CUR MEDS BY ELIG CLIN: HCPCS | Performed by: FAMILY MEDICINE

## 2020-11-04 PROCEDURE — 3017F COLORECTAL CA SCREEN DOC REV: CPT | Performed by: FAMILY MEDICINE

## 2020-11-04 RX ORDER — BUPROPION HYDROCHLORIDE 100 MG/1
100 TABLET, EXTENDED RELEASE ORAL 2 TIMES DAILY
Qty: 60 TABLET | Refills: 3 | Status: CANCELLED | OUTPATIENT
Start: 2020-11-04

## 2020-11-04 RX ORDER — ESCITALOPRAM OXALATE 10 MG/1
10 TABLET ORAL DAILY
Qty: 30 TABLET | Refills: 3 | Status: SHIPPED | OUTPATIENT
Start: 2020-11-04 | End: 2020-12-22 | Stop reason: SDUPTHER

## 2020-11-04 RX ORDER — PANTOPRAZOLE SODIUM 40 MG/1
40 TABLET, DELAYED RELEASE ORAL
Qty: 90 TABLET | Refills: 3 | Status: SHIPPED | OUTPATIENT
Start: 2020-11-04

## 2020-11-04 RX ORDER — ALBUTEROL SULFATE 90 UG/1
2 AEROSOL, METERED RESPIRATORY (INHALATION) EVERY 6 HOURS PRN
Qty: 3 INHALER | Refills: 3 | Status: SHIPPED | OUTPATIENT
Start: 2020-11-04 | End: 2022-08-03

## 2020-11-04 RX ORDER — AMLODIPINE BESYLATE 10 MG/1
10 TABLET ORAL DAILY
Qty: 90 TABLET | Refills: 3 | Status: SHIPPED | OUTPATIENT
Start: 2020-11-04 | End: 2021-12-14

## 2020-11-04 RX ORDER — FOLIC ACID 1 MG/1
TABLET ORAL
Qty: 90 TABLET | Refills: 3 | Status: SHIPPED | OUTPATIENT
Start: 2020-11-04 | End: 2021-11-15

## 2020-11-04 RX ORDER — ATORVASTATIN CALCIUM 80 MG/1
80 TABLET, FILM COATED ORAL NIGHTLY
Qty: 90 TABLET | Refills: 3 | Status: SHIPPED | OUTPATIENT
Start: 2020-11-04 | End: 2021-11-15

## 2020-11-04 ASSESSMENT — PATIENT HEALTH QUESTIONNAIRE - PHQ9
SUM OF ALL RESPONSES TO PHQ QUESTIONS 1-9: 9
4. FEELING TIRED OR HAVING LITTLE ENERGY: 0
7. TROUBLE CONCENTRATING ON THINGS, SUCH AS READING THE NEWSPAPER OR WATCHING TELEVISION: 3
8. MOVING OR SPEAKING SO SLOWLY THAT OTHER PEOPLE COULD HAVE NOTICED. OR THE OPPOSITE, BEING SO FIGETY OR RESTLESS THAT YOU HAVE BEEN MOVING AROUND A LOT MORE THAN USUAL: 0
1. LITTLE INTEREST OR PLEASURE IN DOING THINGS: 2
3. TROUBLE FALLING OR STAYING ASLEEP: 3
2. FEELING DOWN, DEPRESSED OR HOPELESS: 1
SUM OF ALL RESPONSES TO PHQ QUESTIONS 1-9: 9
SUM OF ALL RESPONSES TO PHQ QUESTIONS 1-9: 9
9. THOUGHTS THAT YOU WOULD BE BETTER OFF DEAD, OR OF HURTING YOURSELF: 0
SUM OF ALL RESPONSES TO PHQ9 QUESTIONS 1 & 2: 3
10. IF YOU CHECKED OFF ANY PROBLEMS, HOW DIFFICULT HAVE THESE PROBLEMS MADE IT FOR YOU TO DO YOUR WORK, TAKE CARE OF THINGS AT HOME, OR GET ALONG WITH OTHER PEOPLE: 0
5. POOR APPETITE OR OVEREATING: 0
6. FEELING BAD ABOUT YOURSELF - OR THAT YOU ARE A FAILURE OR HAVE LET YOURSELF OR YOUR FAMILY DOWN: 0

## 2020-11-04 ASSESSMENT — LIFESTYLE VARIABLES: HOW OFTEN DO YOU HAVE A DRINK CONTAINING ALCOHOL: 0

## 2020-11-05 ENCOUNTER — TELEPHONE (OUTPATIENT)
Dept: FAMILY MEDICINE CLINIC | Age: 60
End: 2020-11-05

## 2020-11-05 ENCOUNTER — HOSPITAL ENCOUNTER (OUTPATIENT)
Age: 60
Discharge: HOME | End: 2020-11-05
Payer: MEDICARE

## 2020-11-05 LAB
ALBUMIN SERPL-MCNC: 4 G/DL (ref 3.5–5.1)
ALP BLD-CCNC: 90 U/L (ref 38–126)
ALT SERPL-CCNC: 17 U/L (ref 11–66)
ANION GAP SERPL CALCULATED.3IONS-SCNC: 10 MEQ/L (ref 8–16)
AST SERPL-CCNC: 18 U/L (ref 5–40)
AVERAGE GLUCOSE: 123 MG/DL (ref 70–126)
BASOPHILS # BLD: 0.5 %
BASOPHILS ABSOLUTE: 0 THOU/MM3 (ref 0–0.1)
BILIRUB SERPL-MCNC: 0.4 MG/DL (ref 0.3–1.2)
BUN BLDV-MCNC: 8 MG/DL (ref 7–22)
CALCIUM SERPL-MCNC: 9.5 MG/DL (ref 8.5–10.5)
CHLORIDE BLD-SCNC: 107 MEQ/L (ref 98–111)
CHOLESTEROL, TOTAL: 161 MG/DL (ref 100–199)
CO2: 29 MEQ/L (ref 23–33)
CREAT SERPL-MCNC: 0.8 MG/DL (ref 0.4–1.2)
CREATININE, URINE: 197.7 MG/DL
EOSINOPHIL # BLD: 0.6 %
EOSINOPHILS ABSOLUTE: 0 THOU/MM3 (ref 0–0.4)
ERYTHROCYTE [DISTWIDTH] IN BLOOD BY AUTOMATED COUNT: 13.5 % (ref 11.5–14.5)
ERYTHROCYTE [DISTWIDTH] IN BLOOD BY AUTOMATED COUNT: 46.1 FL (ref 35–45)
GFR SERPL CREATININE-BSD FRML MDRD: 73 ML/MIN/1.73M2
GLUCOSE BLD-MCNC: 100 MG/DL (ref 70–108)
HBA1C MFR BLD: 6.1 % (ref 4.4–6.4)
HCT VFR BLD CALC: 47.1 % (ref 37–47)
HDLC SERPL-MCNC: 54 MG/DL
HEMOGLOBIN: 15.2 GM/DL (ref 12–16)
IMMATURE GRANS (ABS): 0.01 THOU/MM3 (ref 0–0.07)
IMMATURE GRANULOCYTES: 0.2 %
LDL CHOLESTEROL CALCULATED: 76 MG/DL
LYMPHOCYTES # BLD: 32.6 %
LYMPHOCYTES ABSOLUTE: 2.1 THOU/MM3 (ref 1–4.8)
MCH RBC QN AUTO: 30.2 PG (ref 26–33)
MCHC RBC AUTO-ENTMCNC: 32.3 GM/DL (ref 32.2–35.5)
MCV RBC AUTO: 93.6 FL (ref 81–99)
MICROALBUMIN UR-MCNC: < 1.2 MG/DL
MICROALBUMIN/CREAT UR-RTO: 6 MG/G (ref 0–30)
MONOCYTES # BLD: 9.2 %
MONOCYTES ABSOLUTE: 0.6 THOU/MM3 (ref 0.4–1.3)
NUCLEATED RED BLOOD CELLS: 0 /100 WBC
PLATELET # BLD: 207 THOU/MM3 (ref 130–400)
PMV BLD AUTO: 10.4 FL (ref 9.4–12.4)
POTASSIUM SERPL-SCNC: 4 MEQ/L (ref 3.5–5.2)
RBC # BLD: 5.03 MILL/MM3 (ref 4.2–5.4)
SEG NEUTROPHILS: 56.9 %
SEGMENTED NEUTROPHILS ABSOLUTE COUNT: 3.6 THOU/MM3 (ref 1.8–7.7)
SODIUM BLD-SCNC: 146 MEQ/L (ref 135–145)
TOTAL PROTEIN: 6.5 G/DL (ref 6.1–8)
TRIGL SERPL-MCNC: 154 MG/DL (ref 0–199)
TSH SERPL DL<=0.05 MIU/L-ACNC: 2.29 UIU/ML (ref 0.4–4.2)
WBC # BLD: 6.4 THOU/MM3 (ref 4.8–10.8)

## 2020-11-05 PROCEDURE — 82043 UR ALBUMIN QUANTITATIVE: CPT

## 2020-11-05 PROCEDURE — 36415 COLL VENOUS BLD VENIPUNCTURE: CPT

## 2020-11-05 PROCEDURE — 84443 ASSAY THYROID STIM HORMONE: CPT

## 2020-11-05 PROCEDURE — 80053 COMPREHEN METABOLIC PANEL: CPT

## 2020-11-05 PROCEDURE — 83036 HEMOGLOBIN GLYCOSYLATED A1C: CPT

## 2020-11-05 PROCEDURE — 85025 COMPLETE CBC W/AUTO DIFF WBC: CPT

## 2020-11-05 PROCEDURE — 80061 LIPID PANEL: CPT

## 2020-11-05 NOTE — TELEPHONE ENCOUNTER
----- Message from Tara Murrayutant, DO sent at 11/5/2020  3:14 PM EST -----  Please let pt know that labs are stable and appropriate. Sugar still in prediabetic range, but better than last check  Will review at f/u visit  Let me know if questions, thanks!

## 2020-11-16 ENCOUNTER — TELEPHONE (OUTPATIENT)
Dept: CARDIOLOGY CLINIC | Age: 60
End: 2020-11-16

## 2020-11-16 NOTE — LETTER
[address] [facility]  [facility].  [address]  [facility] [address]  Phone: [phone]  Fax: [phone]      November 16, 2020     Adryan Schulz  [address] [facility]      Dear Aram Herrera:    I am writing you because I have been informed of your missed appointment(s). We care about you and the management of your healthcare and want to make sure that you follow up as recommended. We're sorry you were unable to keep your appointment and hope that you are doing well. We would like to continue treating your healthcare needs. Please call the office to let us know your plans for treatment and to reschedule your appointment.      Sincerely,        Mina Rutledge MD

## 2020-12-08 ENCOUNTER — PROCEDURE VISIT (OUTPATIENT)
Dept: CARDIOLOGY CLINIC | Age: 60
End: 2020-12-08
Payer: MEDICARE

## 2020-12-08 PROCEDURE — 93298 REM INTERROG DEV EVAL SCRMS: CPT | Performed by: INTERNAL MEDICINE

## 2020-12-08 PROCEDURE — G2066 INTER DEVC REMOTE 30D: HCPCS | Performed by: INTERNAL MEDICINE

## 2020-12-12 ENCOUNTER — TELEPHONE (OUTPATIENT)
Dept: CARDIOLOGY CLINIC | Age: 60
End: 2020-12-12

## 2020-12-14 NOTE — TELEPHONE ENCOUNTER
Pt aware of INES. Pt prefers to leave it in.
She will bring the box with her to Dr Finley's appt as I am unclear if she needs to send back.
carelink medtronic linq     At [facility], pt needs notified  Does she want it removed?   If yes, schedule with Sam, no need for office appt
Licha Dodge), Obstetrics and Gynecology  [address] [place] [address]  Phone: [phone]  Fax: [phone]

## 2020-12-21 NOTE — PROGRESS NOTES
Chief Complaint   Patient presents with    Follow-up     issues as ntoed below       History obtained from the patient. SUBJECTIVE:  Stephanie Diana is a 56 y.o. female that presents today for       -Depression LAST VISIT:  Inc depression  On wellbutrin  Not helping  Not sleeping well  +ahedonia  Inc feelings of guilt. No SI/HI    UPDATE TODAY:   Moods much better  lexapro working well  Still having trouble falling and staying asleep  No SI/HI       -PreDM:  Lost to f/u previously  Labs completed, here to review  wts about the same  Working on wt loss        -Smoking PRIOR VISIT: wants to quit, would like chantix     UPDATE PRIOR VISIT: on chantix, working well. Down to 3 cig per day. No side effects.       UPDATE PRIOR VISIT: smoke free 25 days so far. Off chantix. UPDATE LAST VISIT:   Currently smoking 2 cig per wk  Not ready to fully quit.       UPDATE TODAY:   Pt states quit since last visit  Not even the 2 cig per wk       Age/Gender Health Maintenance    Lipid -   Lab Results   Component Value Date    CHOL 161 11/05/2020    CHOL 172 01/09/2019    CHOL 238 (H) 06/21/2018     Lab Results   Component Value Date    TRIG 154 11/05/2020    TRIG 148 01/09/2019    TRIG 139 06/21/2018     Lab Results   Component Value Date    HDL 54 11/05/2020    HDL 47 01/09/2019    HDL 45 06/21/2018     Lab Results   Component Value Date    LDLCALC 76 11/05/2020    [address] 01/09/2019    LDLCALC 165 06/21/2018     Lab Results   Component Value Date    VLDL 33 12/14/2011     No results found for: CHOLHDLRATIO      DM Screen - 101 fasting, (June 2018)  Lab Results   Component Value Date    GLUCOSE 100 11/05/2020    GLUCOSE 143 10/15/2019     Lab Results   Component Value Date    LABA1C 6.1 11/05/2020     No results found for: EAG      Colon Cancer Screening - + sessile T/a FEB 2019, repeat 2 years per GI, Catia  Lung Cancer Screening (Age 54 to [de-identified] with 30 pack year hx, current smoker or quit within past 15 years) - + nodule JAN 2019, repeat 3 months/repeat NEG APR 2019, repeat 1 year per rads    Tetanus - to get at pharmacy per medicare rules  Influenza Vaccine - declines NOV 2020  Pneumonia Vaccine - UTD PPV 23 APR 2014  Zoster - to get at pharmacy per medicare rules    Breast Cancer Screening - NEG JAN 2019  Cervical Cancer Screening - NEG FEB 2019   Osteoporosis Screening - age 70      Specialist List  Neuro: Yayo  GI: Xuanna Yvan: [address]  Cardio: [address]  [facility]: [facility]      Current Outpatient Medications   Medication Sig Dispense Refill    melatonin 3 MG TABS tablet Take 6 mg by mouth nightly      traZODone (DESYREL) 50 MG tablet Take 1 tablet by mouth nightly 90 tablet 1    escitalopram (LEXAPRO) 10 MG tablet Take 1 tablet by mouth daily 90 tablet 3    Handicap Placard MISC by Does not apply route Expires 22 DEC 2025 1 each 0    pantoprazole (PROTONIX) 40 MG tablet Take 1 tablet by mouth every morning (before breakfast) 90 tablet 3    amLODIPine (NORVASC) 10 MG tablet Take 1 tablet by mouth daily 90 tablet 3    atorvastatin (LIPITOR) 80 MG tablet Take 1 tablet by mouth nightly 90 tablet 3    folic acid (FOLVITE) 1 MG tablet take 1 tablet by mouth once daily 90 tablet 3    albuterol sulfate HFA (PROAIR HFA) 108 (90 Base) MCG/ACT inhaler Inhale 2 puffs into the lungs every 6 hours as needed for Wheezing or Shortness of Breath 3 Inhaler 3    meloxicam (MOBIC) 15 MG tablet take 1 tablet by mouth once daily if needed for pain 30 tablet 3    sucralfate (CARAFATE) 1 GM tablet Take 1 tablet by mouth 4 times daily 40 tablet 0    famotidine (PEPCID) 20 MG tablet Take 1 tablet by mouth 2 times daily 180 tablet 3    clopidogrel (PLAVIX) 75 MG tablet Take 1 tablet by mouth daily 90 tablet 3    benzonatate (TESSALON) 100 MG capsule Take 1 capsule by mouth 3 times daily as needed for Cough 30 capsule 5    salmeterol (SEREVENT DISKUS) 50 MCG/DOSE diskus inhaler Inhale 1 puff into the lungs 2 times daily 1 each 3    acetaminophen (TYLENOL) 325 MG tablet Take 650-975 mg by mouth every 6 hours as needed for Pain      ondansetron (ZOFRAN-ODT) 4 MG disintegrating tablet Take by mouth       No current facility-administered medications for this visit. Orders Placed This Encounter   Medications    traZODone (DESYREL) 50 MG tablet     Sig: Take 1 tablet by mouth nightly     Dispense:  90 tablet     Refill:  1    escitalopram (LEXAPRO) 10 MG tablet     Sig: Take 1 tablet by mouth daily     Dispense:  90 tablet     Refill:  3    Handicap Placard MISC     Sig: by Does not apply route Expires 22 DEC 2025     Dispense:  1 each     Refill:  0         All medications reviewed and reconciled, including OTC and herbal medications. Updated list given to patient. Patient Active Problem List    Diagnosis Date Noted    Major depression     Prediabetes     Chronic bilateral low back pain with left-sided sciatica 06/12/2018    GERD (gastroesophageal reflux disease)     Former smoker     Osteoarthritis     Dyslipidemia 05/19/2017    History of stroke with residual deficit; cryptogenic 05/15/2017     Followed with neuro at [facility], last note states can f/u prn.  Also follows with cardio at [facility]        Obesity (BMI 30-39.9) 05/08/2014    Essential hypertension 05/08/2014    Chronic pancreatitis ([address] 05/08/2014    AAA (abdominal aortic aneurysm) ([address] 04/18/2014     Follows with Dr. Claudio mauricio      COPD (chronic obstructive pulmonary disease) [facility])        Past Medical History:   Diagnosis Date    AAA (abdominal aortic aneurysm) ([address] 04/18/2014    Follows with Dr. Claudio mauricio    Chronic pancreatitis ([address]     COPD (chronic obstructive pulmonary disease) ([address]     Dyslipidemia     Essential hypertension 05/08/2014    Former smoker     GERD (gastroesophageal reflux disease)     History of stroke with residual deficit; cryptogenic 05/2017    Follows with neuro and cardio at [facility]    Major depression     Obesity (BMI 30-39. 9)     Osteoarthritis     Prediabetes        Past Surgical History:   Procedure Laterality Date    ABDOMEN SURGERY      c sections x3    CARPAL TUNNEL RELEASE Bilateral      SECTION      x3    COLONOSCOPY      Dr. Maria Luisa Zavaleta EGD  [id number]    INSERTABLE CARDIAC MONITOR      TONSILLECTOMY      as child    UPPER GASTROINTESTINAL ENDOSCOPY         Allergies   Allergen Reactions    Aspirin      Upset stomach    Flagyl [Metronidazole] Other (See Comments)     abd pain    Ibuprofen      Stomach pain    Spiriva Handihaler [Tiotropium Bromide Monohydrate] Other (See Comments)     Upper chest pain     Sulfa Antibiotics Other (See Comments)     PT WAS KID DOESNT KNOW       Social History     Tobacco Use    Smoking status: Former Smoker     Packs/day: 1.00     Years: 45.00     Pack years: 45.00     Types: Cigarettes     Quit date: 2018     Years since quittin.0    Smokeless tobacco: Never Used   Substance Use Topics    Alcohol use: No     Alcohol/week: 0.0 standard drinks       Family History   Problem Relation Age of Onset    Diabetes Maternal Grandmother     Diabetes Mother     Heart Disease Mother    [facility] Cancer Father         Something in the chest, pt not sure what    Heart Disease Father     COPD Father     Diabetes Maternal Aunt     High Blood Pressure Brother     Colon Cancer Other     Arthritis Neg Hx     Asthma Neg Hx     Birth Defects Neg Hx     Depression Neg Hx     Early Death Neg Hx     Hearing Loss Neg Hx     High Cholesterol Neg Hx     Kidney Disease Neg Hx     Learning Disabilities Neg Hx     Mental Illness Neg Hx     Mental Retardation Neg Hx     Miscarriages / Stillbirths Neg Hx     Stroke Neg Hx     Substance Abuse Neg Hx     Vision Loss Neg Hx     Breast Cancer Neg Hx          I have reviewed the patient's past medical history, past surgical history, allergies, medications, social and family history and I have made updates where appropriate. Review of Systems  Positive responses are highlighted in bold    Constitutional:  Fever, Chills, Night Sweats, Fatigue, Unexpected changes in weight  HENT:  Ear pain, Tinnitus, Nosebleeds, Trouble swallowing, Hearing loss, Sore throat  Cardiovascular:  Chest Pain, Palpitations, Orthopnea, Paroxysmal Nocturnal Dyspnea  Respiratory:  Cough, Wheezing, Shortness of breath, Chest tightness, Apnea  Gastrointestinal:  Nausea, Vomiting, Diarrhea, Constipation, Heartburn, Blood in stool  Genitourinary:  Difficulty or painful urination, Flank pain, Change in frequency, Urgency  Skin:  Color change, Rash, Itching, Wound  Musculoskeletal:  Joint pain, Back pain, Gait problems, Joint swelling, Myalgias  Neurological:  Dizziness, Headaches, Presyncope, Numbness, Seizures, Tremors  Endocrine:  Heat Intolerance, Cold Intolerance, Polydipsia, Polyphagia, Polyuria      PHYSICAL EXAM:  Vitals:    12/22/20 1021   BP: 132/73   Pulse: 74   Resp: 14   Temp: 97.6 °F (36.4 °C)   TempSrc: Temporal   SpO2: 95%   Weight: 181 lb 3.2 oz (82.2 kg)   Height: 5' 5\" (1.651 m)     Body mass index is 30.15 kg/m². Pain Score:   0 - No pain    VS Reviewed  General Appearance: A&O x 3, No acute distress,well developed and well- nourished  Eyes: pupils equal, round, and reactive to light, extraocular eye movements intact, conjunctivae and eye lids without erythema  ENT: external ear and ear canal clear bilaterally, TMs intact and regular, nose without deformity, nasal mucosa and turbinates normal without polyps, oropharynx normal, dentition is normal for age  Neck: supple and non-tender without mass, no thyromegaly or thyroid nodules, no cervical lymphadenopathy  Pulmonary/Chest: distant but clear to auscultation bilaterally- no wheezes, rales or rhonchi, normal air movement, no respiratory distress or retractions  Cardiovascular: distant with S1 and S2 auscultated w/ RRR.  No murmurs, rubs, clicks, or gallops, distal pulses intact. Abdomen: soft, non-tender, non-distended, bowel sounds physiologic,  no rebound or guarding, no masses or hernias noted. Liver and spleen without enlargement. Extremities: no cyanosis, clubbing or edema of the lower extremities. +2 PT/DP bilaterally. Musculoskeletal: No joint swelling or gross deformity   Skin: warm and dry. No rash or erythema. Psych: Affect appropriate. Mood euthymic. Thought process is normal without evidence of psychosis. Good insight and appropriate interaction. Cognition and memory appear to be intact. ASSESSMENT & PLAN  1. Recurrent major depressive disorder, in full remission ([facility]    Good improvement  con't lexapro at present dose  Add trazodone for sleep. Call in 4 wks if sleep not improving  Otherwise f/u 6 months    - escitalopram (LEXAPRO) 10 MG tablet; Take 1 tablet by mouth daily  Dispense: 90 tablet; Refill: 3  - Handicap Placard MISC; by Does not apply route Expires 22 DEC 2025  Dispense: 1 each; Refill: 0    2. Insomnia, unspecified type    As per # 1    - traZODone (DESYREL) 50 MG tablet; Take 1 tablet by mouth nightly  Dispense: 90 tablet; Refill: 1  - Handicap Placard MISC; by Does not apply route Expires 22 DEC 2025  Dispense: 1 each; Refill: 0    3. Prediabetes    Discussed life style changes  Labs in 6 mnths  In call back cue    - Handicap Placard MISC; by Does not apply route Expires 22 DEC 2025  Dispense: 1 each; Refill: 0    4. Cigarette nicotine dependence in remission    In early remission  Congratulated and encouragement given  F/u if starts smoking again    - Handicap Placard MISC; by Does not apply route Expires 22 DEC 2025  Dispense: 1 each; Refill: 0      DISPOSITION    Return in about 6 months (around 6/22/2021) for follow-up on chronic medical conditions, sooner as needed. Mundo Fritz released without restrictions.     Future Appointments   Date Time Provider Kimber Jacinto   1/7/2021  9:00 AM Jose Jameson MD [facility] [facility] 6/22/2021 10:00 AM Felicita Desai DO [facility] Stella received counseling on the following healthy behaviors: nutrition, exercise, medication adherence and tobacco cessation    Patient given educational materials on: See Attached    I have instructed Gracie Villalba to complete a self tracking handout on Smoking and instructed them to bring it with them to her next appointment. Barriers to learning and self management: none    Discussed use, benefit, and side effects of prescribed medications. Barriers to medication compliance addressed. All patient questions answered. Pt voiced understanding.        Electronically signed by Felicita Desai DO on 12/22/2020 at 12:40 PM

## 2020-12-22 ENCOUNTER — OFFICE VISIT (OUTPATIENT)
Dept: FAMILY MEDICINE CLINIC | Age: 60
End: 2020-12-22
Payer: MEDICARE

## 2020-12-22 VITALS
HEART RATE: 74 BPM | SYSTOLIC BLOOD PRESSURE: 132 MMHG | WEIGHT: 181.2 LBS | OXYGEN SATURATION: 95 % | DIASTOLIC BLOOD PRESSURE: 73 MMHG | TEMPERATURE: 97.6 F | HEIGHT: 65 IN | BODY MASS INDEX: 30.19 KG/M2 | RESPIRATION RATE: 14 BRPM

## 2020-12-22 PROCEDURE — 1036F TOBACCO NON-USER: CPT | Performed by: FAMILY MEDICINE

## 2020-12-22 PROCEDURE — G8417 CALC BMI ABV UP PARAM F/U: HCPCS | Performed by: FAMILY MEDICINE

## 2020-12-22 PROCEDURE — 99214 OFFICE O/P EST MOD 30 MIN: CPT | Performed by: FAMILY MEDICINE

## 2020-12-22 PROCEDURE — 3017F COLORECTAL CA SCREEN DOC REV: CPT | Performed by: FAMILY MEDICINE

## 2020-12-22 PROCEDURE — G8484 FLU IMMUNIZE NO ADMIN: HCPCS | Performed by: FAMILY MEDICINE

## 2020-12-22 PROCEDURE — G8427 DOCREV CUR MEDS BY ELIG CLIN: HCPCS | Performed by: FAMILY MEDICINE

## 2020-12-22 RX ORDER — ESCITALOPRAM OXALATE 10 MG/1
10 TABLET ORAL DAILY
Qty: 90 TABLET | Refills: 3 | Status: SHIPPED | OUTPATIENT
Start: 2020-12-22 | End: 2022-02-10

## 2020-12-22 RX ORDER — ONDANSETRON 4 MG/1
TABLET, ORALLY DISINTEGRATING ORAL
COMMUNITY
End: 2022-10-30 | Stop reason: SDUPTHER

## 2020-12-22 RX ORDER — LANOLIN ALCOHOL/MO/W.PET/CERES
6 CREAM (GRAM) TOPICAL NIGHTLY
COMMUNITY
End: 2021-06-22

## 2020-12-22 RX ORDER — TRAZODONE HYDROCHLORIDE 50 MG/1
50 TABLET ORAL NIGHTLY
Qty: 90 TABLET | Refills: 1 | Status: SHIPPED | OUTPATIENT
Start: 2020-12-22 | End: 2021-06-16 | Stop reason: SDUPTHER

## 2020-12-22 NOTE — PATIENT INSTRUCTIONS
Patient Education        Depression and Chronic Disease: Care Instructions  Your Care Instructions     A chronic disease is one that you have for a long time. Some chronic diseases can be controlled, but they usually cannot be cured. Depression is common in people with chronic diseases, but it often goes unnoticed. Many people have concerns about seeking treatment for a mental health problem. You may think it's a sign of weakness, or you don't want people to know about it. It's important to overcome these reasons for not seeking treatment. Treating depression or anxiety is good for your health. Follow-up care is a key part of your treatment and safety. Be sure to make and go to all appointments, and call your doctor if you are having problems. It's also a good idea to know your test results and keep a list of the medicines you take. How can you care for yourself at home? Watch for symptoms of depression  The symptoms of depression are often subtle at first. You may think they are caused by your disease rather than depression. Or you may think it is normal to be depressed when you have a chronic disease. If you are depressed you may:  · Feel sad or hopeless. · Feel guilty or worthless. · Not enjoy the things you used to enjoy. · Feel hopeless, as though life is not worth living. · Have trouble thinking or remembering. · Have low energy, and you may not eat or sleep well. · Pull away from others. · Think often about death or killing yourself. (Keep the numbers for these national suicide hotlines: 9-121-919-TALK [1-480.548.5815] and 4-839-KHOUYPC [1-786.871.9392]. )  Get treatment  By treating your depression, you can feel more hopeful and have more energy. If you feel better, you may take better care of yourself, so your health may improve. · Talk to your doctor if you have any changes in mood during treatment for your disease. · Ask your doctor for help.  Counseling, antidepressant medicine, or a

## 2021-01-11 ENCOUNTER — TELEPHONE (OUTPATIENT)
Dept: CARDIOLOGY CLINIC | Age: 61
End: 2021-01-11

## 2021-01-11 NOTE — TELEPHONE ENCOUNTER
ARACELI:      PT HAS A LINQ RECORDER AND SHE HIT INES ON 12/9/20  I CALLED THE PT AND SHE SAID SHE IS JUST GOING TO LEAVE THE LINQ RECORDER IN HER CHEST. SAID SHE DOES NOT WANT TO GO THROUGH TAKING IT OUT RIGHT NOW AND IF SHE CHANGES HER MIND SHE WILL LET US KNOW.  [address]

## 2021-01-12 ENCOUNTER — TELEPHONE (OUTPATIENT)
Dept: FAMILY MEDICINE CLINIC | Age: 61
End: 2021-01-12

## 2021-01-12 ENCOUNTER — HOSPITAL ENCOUNTER (OUTPATIENT)
Dept: WOMENS IMAGING | Age: 61
Discharge: HOME | End: 2021-01-12
Payer: MEDICARE

## 2021-01-12 ENCOUNTER — HOSPITAL ENCOUNTER (OUTPATIENT)
Dept: CT IMAGING | Age: 61
Discharge: HOME | End: 2021-01-12
Payer: MEDICARE

## 2021-01-12 DIAGNOSIS — Z12.31 ENCOUNTER FOR SCREENING MAMMOGRAM FOR MALIGNANT NEOPLASM OF BREAST: ICD-10-CM

## 2021-01-12 DIAGNOSIS — Z87.891 PERSONAL HISTORY OF TOBACCO USE: ICD-10-CM

## 2021-01-12 PROCEDURE — 77067 SCR MAMMO BI INCL CAD: CPT

## 2021-01-12 PROCEDURE — 71271 CT THORAX LUNG CANCER SCR C-: CPT

## 2021-01-12 NOTE — TELEPHONE ENCOUNTER
----- Message from Lauren Vann DO sent at 1/12/2021 12:18 PM EST -----  Please let pt know that LDCT of chest is negative for cancer. Repeat 1 year per current guidelines. Let me know if questions, thanks! Please let pt know that mammogram is normal. Let me know if questions, thanks!

## 2021-02-15 ENCOUNTER — TELEPHONE (OUTPATIENT)
Dept: CARDIOLOGY CLINIC | Age: 61
End: 2021-02-15

## 2021-02-15 NOTE — LETTER
[address] [facility]  [facility]  [address]  [facility] [address]  Phone: [phone]  Fax: [phone]      February 15, 2021     C[facility]  [address] [facility]      Dear Spike Barroso:    I am writing you because I have been informed of your missed appointment(s). We care about you and the management of your healthcare and want to make sure that you follow up as recommended. We're sorry you were unable to keep your appointment and hope that you are doing well. We would like to continue treating your healthcare needs. Please call the office to let us know your plans for treatment and to reschedule your appointment.      Sincerely,        Sanford Rg MD

## 2021-05-18 ENCOUNTER — HOSPITAL ENCOUNTER (EMERGENCY)
Age: 61
Discharge: HOME | End: 2021-05-18
Payer: MEDICARE

## 2021-05-18 VITALS
TEMPERATURE: 97.3 F | SYSTOLIC BLOOD PRESSURE: 141 MMHG | DIASTOLIC BLOOD PRESSURE: 64 MMHG | HEART RATE: 63 BPM | WEIGHT: 180 LBS | OXYGEN SATURATION: 96 % | BODY MASS INDEX: 29.95 KG/M2 | RESPIRATION RATE: 16 BRPM

## 2021-05-18 DIAGNOSIS — H60.391 INFECTIVE OTITIS EXTERNA OF RIGHT EAR: Primary | ICD-10-CM

## 2021-05-18 DIAGNOSIS — J02.9 ACUTE VIRAL PHARYNGITIS: ICD-10-CM

## 2021-05-18 PROCEDURE — 99213 OFFICE O/P EST LOW 20 MIN: CPT

## 2021-05-18 PROCEDURE — 99213 OFFICE O/P EST LOW 20 MIN: CPT | Performed by: NURSE PRACTITIONER

## 2021-05-18 RX ORDER — NEOMYCIN SULFATE, POLYMYXIN B SULFATE AND HYDROCORTISONE 10; 3.5; 1 MG/ML; MG/ML; [USP'U]/ML
3 SUSPENSION/ DROPS AURICULAR (OTIC) 4 TIMES DAILY
Qty: 1 BOTTLE | Refills: 0 | Status: SHIPPED | OUTPATIENT
Start: 2021-05-18 | End: 2021-05-25

## 2021-05-18 ASSESSMENT — ENCOUNTER SYMPTOMS
SORE THROAT: 1
RHINORRHEA: 0
DIARRHEA: 0
NAUSEA: 0
VOMITING: 0
TROUBLE SWALLOWING: 0
EYE DISCHARGE: 0
COUGH: 0
SHORTNESS OF BREATH: 0
EYE REDNESS: 0

## 2021-05-18 ASSESSMENT — PAIN DESCRIPTION - PAIN TYPE: TYPE: ACUTE PAIN

## 2021-05-18 ASSESSMENT — PAIN DESCRIPTION - LOCATION: LOCATION: EAR

## 2021-05-18 ASSESSMENT — PAIN DESCRIPTION - ORIENTATION: ORIENTATION: RIGHT

## 2021-05-18 ASSESSMENT — PAIN - FUNCTIONAL ASSESSMENT: PAIN_FUNCTIONAL_ASSESSMENT: PREVENTS OR INTERFERES SOME ACTIVE ACTIVITIES AND ADLS

## 2021-05-18 NOTE — ED TRIAGE NOTES
Patient c/o rt. Ear pain, slight sore throat. No OTC pain med taken. Used Sweet oil drops in rt. Ear.

## 2021-05-18 NOTE — ED PROVIDER NOTES
[phone]       Chief Complaint   Patient presents with    Otalgia     rt. ear pain    Pharyngitis     slight       Nurses Notes reviewed and I agree except as noted in the HPI. HISTORY OF PRESENT ILLNESS   Carrillo Coelho is a 56 y.o. female who presents with complaints of ear pain and sore throat. Onset of symptoms today, unchanged. Ear pain is aching/throbbing, continuous. Rates 10/10. Associated slight sore throat. No fever or trouble swallowing. No loss of taste or smell. No recent URI. No cough. No travel or ill exposure. Current tobacco user. No treatment prior to arrival.    REVIEW OF SYSTEMS     Review of Systems   Constitutional: Negative for chills, diaphoresis, fatigue and fever. HENT: Positive for ear pain and sore throat. Negative for congestion, rhinorrhea and trouble swallowing. Eyes: Negative for discharge and redness. Respiratory: Negative for cough and shortness of breath. Cardiovascular: Negative for chest pain. Gastrointestinal: Negative for diarrhea, nausea and vomiting. Genitourinary: Negative for decreased urine volume. Musculoskeletal: Negative for neck pain and neck stiffness. Skin: Negative for rash. Neurological: Negative for headaches. Hematological: Negative for adenopathy. Psychiatric/Behavioral: Negative for sleep disturbance. PAST MEDICAL HISTORY         Diagnosis Date    AAA [facility]abdominal aortic aneurysm) ([facility] 04/18/2014    Follows with Dr. Julio C Nolan group    Chronic pancreatitis ([facility]     COPD (chronic obstructive pulmonary disease) ([facility]     Dyslipidemia     Essential hypertension 05/08/2014    Former smoker     GERD (gastroesophageal reflux disease)     History of stroke with residual deficit; cryptogenic 05/2017    [facility]    Major depression     Obesity (BMI 30-39. 9)     Osteoarthritis     Prediabetes        SURGICAL HISTORY Breath, Disp-3 Inhaler,R-3Normal      benzonatate (TESSALON) 100 MG capsule Take 1 capsule by mouth 3 times daily as needed for Cough, Disp-30 capsule, R-5Normal      salmeterol (SEREVENT DISKUS) 50 MCG/DOSE diskus inhaler Inhale 1 puff into the lungs 2 times daily, Disp-1 each, R-3Normal             ALLERGIES     Patient is is allergic to aspirin, flagyl [metronidazole], ibuprofen, spiriva handihaler [tiotropium bromide monohydrate], and sulfa antibiotics. FAMILY HISTORY     Patient'sfamily history includes COPD in her father; Cancer in her father; Colon Cancer in an other family member; Diabetes in her maternal aunt, maternal grandmother, and mother; Heart Disease in her father and mother; High Blood Pressure in her brother. SOCIAL HISTORY     Patient  reports that she has been smoking cigarettes. She has been smoking about 0.00 packs per day for the past 45.00 years. She has never used smokeless tobacco. She reports current drug use. Drug: Marijuana. She reports that she does not drink alcohol. PHYSICAL EXAM     ED TRIAGE VITALS  BP: (!) 141/64, Temp: 97.3 °F (36.3 °C), Pulse: 63, Resp: 16, SpO2: 96 %  Physical Exam  Vitals and nursing note reviewed. Constitutional:       General: She is not in acute distress. Appearance: Normal appearance. She is well-developed. She is not ill-appearing, toxic-appearing or diaphoretic. HENT:      Head: Normocephalic and atraumatic. Right Ear: Hearing, ear canal and external ear normal. Tenderness present. No drainage or swelling. A middle ear effusion is present. No mastoid tenderness. No hemotympanum. Tympanic membrane is not perforated, erythematous or bulging. Left Ear: Hearing, ear canal and external ear normal. No swelling or tenderness. A middle ear effusion is present. No mastoid tenderness. No hemotympanum. Tympanic membrane is not perforated, erythematous or bulging.       Nose: Nose normal.      Mouth/Throat:      Mouth: Mucous membranes are moist.      Pharynx: Oropharynx is clear. Uvula midline. Tonsils: 0 on the right. Eyes:      General: No scleral icterus. Conjunctiva/sclera: Conjunctivae normal.      Right eye: Right conjunctiva is not injected. No hemorrhage. Left eye: Left conjunctiva is not injected. No hemorrhage. Neck:      Thyroid: No thyromegaly. Trachea: Trachea normal.   Cardiovascular:      Rate and Rhythm: Normal rate and regular rhythm. No extrasystoles are present. Chest Wall: PMI is not displaced. Heart sounds: Normal heart sounds. No murmur heard. No friction rub. No gallop. Pulmonary:      Effort: Pulmonary effort is normal. No respiratory distress. Breath sounds: Normal breath sounds. Musculoskeletal:      Cervical back: Normal range of motion and neck supple. Lymphadenopathy:      Head:      Right side of head: No submental, submandibular, tonsillar or occipital adenopathy. Left side of head: No submental, submandibular, tonsillar or occipital adenopathy. Cervical: No cervical adenopathy. Upper Body:      Right upper body: No supraclavicular adenopathy. Left upper body: No supraclavicular adenopathy. Skin:     General: Skin is warm and dry. Capillary Refill: Capillary refill takes less than 2 seconds. Coloration: Skin is not pale. Findings: No rash. Comments: Skin warm and dry to touch, no rashes noted on exposed surfaces. Neurological:      Mental Status: She is alert and oriented to person, place, and time. She is not disoriented. Psychiatric:         Mood and Affect: Mood normal.         Behavior: Behavior is cooperative. DIAGNOSTIC RESULTS   Labs: No results found for this visit on 05/18/21.     IMAGING:  No orders to display     URGENT CARE COURSE:     Vitals:    05/18/21 1647   BP: (!) 141/64   Pulse: 63   Resp: 16   Temp: 97.3 °F (36.3 °C)   TempSrc: Temporal   SpO2: 96%   Weight: 180 lb (81.6 kg)       Medications - No data to display  PROCEDURES:  None  FINALIMPRESSION      1. Infective otitis externa of right ear    2. Acute viral pharyngitis        DISPOSITION/PLAN   DISPOSITION Decision To Discharge 05/18/2021 04:56:41 PM  Nontoxic, no distress. Exam consistent with right otitis externa, no otitis media. Sore throat secondary to postnasal drainage. Medication as prescribed, avoid water in ear. Continue current medicine. Over-the-counter medicine as needed for pain. If worsening return or go to ER. PATIENT REFERRED TO:  Ilah Blacksmith Dr. Cherisse Atta [address]  [phone]      Follow up as needed. Medication as prescribed.   If worse return or go to ER>    DISCHARGE MEDICATIONS:  Discharge Medication List as of 5/18/2021  4:57 PM      START taking these medications    Details   neomycin-polymyxin-hydrocortisone (CORTISPORIN) 3.5-85410-3 otic suspension Place 3 drops into the right ear 4 times daily for 7 days, Disp-1 Bottle, R-0Normal           Discharge Medication List as of 5/18/2021  4:57 PM          Verkeagann Chilo, APRN - CNP         Verlon Chilo, APRN - CNP  05/18/21 2678

## 2021-05-21 ENCOUNTER — TELEPHONE (OUTPATIENT)
Dept: FAMILY MEDICINE CLINIC | Age: 61
End: 2021-05-21

## 2021-05-21 DIAGNOSIS — R73.9 HYPERGLYCEMIA: ICD-10-CM

## 2021-05-21 DIAGNOSIS — R73.03 PREDIABETES: Primary | ICD-10-CM

## 2021-05-21 NOTE — TELEPHONE ENCOUNTER
Pt due for fasting labs prior to next apt on 6/22/2021. Please call to have pt complete this. Thanks! ASSESSMENT & PLAN   Diagnosis Orders   1. Prediabetes  Glucose, random    Hemoglobin A1C   2.  Hyperglycemia  Glucose, random    Hemoglobin A1C     Future Appointments   Date Time Provider Kimber Jacinto   6/22/2021 10:00 AM [address] [facility]

## 2021-06-15 ENCOUNTER — HOSPITAL ENCOUNTER (OUTPATIENT)
Age: 61
Discharge: HOME | End: 2021-06-15
Payer: MEDICARE

## 2021-06-15 ENCOUNTER — TELEPHONE (OUTPATIENT)
Dept: FAMILY MEDICINE CLINIC | Age: 61
End: 2021-06-15

## 2021-06-15 DIAGNOSIS — R73.9 HYPERGLYCEMIA: ICD-10-CM

## 2021-06-15 DIAGNOSIS — R73.03 PREDIABETES: ICD-10-CM

## 2021-06-15 LAB
AVERAGE GLUCOSE: 108 MG/DL (ref 70–126)
GLUCOSE BLD-MCNC: 99 MG/DL (ref 70–108)
HBA1C MFR BLD: 5.6 % (ref 4.4–6.4)

## 2021-06-15 PROCEDURE — 83036 HEMOGLOBIN GLYCOSYLATED A1C: CPT

## 2021-06-15 PROCEDURE — 36415 COLL VENOUS BLD VENIPUNCTURE: CPT

## 2021-06-15 PROCEDURE — 82947 ASSAY GLUCOSE BLOOD QUANT: CPT

## 2021-06-15 NOTE — TELEPHONE ENCOUNTER
----- Message from Italia White DO sent at 6/15/2021 12:17 PM EDT -----  Please let pt know that blood sugar has good improvement. FBS down to 99 and a1c down to 5.6  Con't with lifestyle/diet changes  F/u as scheduled. Let me know if questions, thanks!

## 2021-06-16 DIAGNOSIS — G47.00 INSOMNIA, UNSPECIFIED TYPE: ICD-10-CM

## 2021-06-16 RX ORDER — TRAZODONE HYDROCHLORIDE 50 MG/1
50 TABLET ORAL NIGHTLY
Qty: 90 TABLET | Refills: 3 | Status: SHIPPED | OUTPATIENT
Start: 2021-06-16 | End: 2022-05-27

## 2021-06-21 NOTE — PROGRESS NOTES
DEC 2019. No abd pain      -Chronic pancreatitis: used to follow with Catia KOVACS. Not seen in a while. However, denies abd pain or diarrhea. -GERD:    HPI:    Taking meds as prescribed ?: yes  Tolerating well ?: yes  Side Effects ?: denies  Dysphagia ?: denies  Odynophagia ?: denies  Melena ?: denies  Working on TLCS ?: yes      -Obesity: working on wt loss, wts stable. Diet ok. Not exercising      -PreDM:  Stable  Labs reviewed today  Working on lifestyle changes      -Smoking PRIOR VISIT: wants to quit, would like chantix     UPDATE PRIOR VISIT: on chantix, working well. Down to 3 cig per day. No side effects.       UPDATE PRIOR VISIT: smoke free 25 days so far. Off chantix.        UPDATE PRIOR VISIT:   Currently smoking 2 cig per wk  Not ready to fully quit.       UPDATE LAST VISIT:   Pt states quit since last visit  Not even the 2 cig per wk     UPDATE TODAY:   Back to smoking a few cig per day  Want to use chantix again, worked well before         -URI type sxs:   Going on a wk  Runny nose, congestion  No fever  No body aches  No loss of taste/smell  No SOB  Vaccinated    Fever - No  Runny nose or congestion -  Yes   Cough -  Yes  Sore throat -  Yes  Headache, fatigue, joint pains, muscle aches -  No  Double Sickening - Yes  Shortness of breath/Wheezing? -  No  Nausea/Vomiting/Diarrhea?   No  Sick contacts - No  Maxillary Tooth Pain -  Yes  Treatment tried and response - OTC meds, no better      Age/Gender Health Maintenance    Lipid -   Lab Results   Component Value Date    CHOL 161 11/05/2020    CHOL 172 01/09/2019    CHOL 238 (H) 06/21/2018     Lab Results   Component Value Date    TRIG 154 11/05/2020    TRIG 148 01/09/2019    TRIG 139 06/21/2018     Lab Results   Component Value Date    HDL 54 11/05/2020    HDL 47 01/09/2019    HDL 45 06/21/2018     Lab Results   Component Value Date    LDLCALC 76 11/05/2020    [address] 01/09/2019    LDLCALC 165 06/21/2018     Lab Results   Component Value Date    VLDL 33 12/14/2011     No results found for: CHOLHDLRATIO      DM Screen - 101 fasting, (June 2018)  Lab Results   Component Value Date    GLUCOSE 99 06/15/2021    GLUCOSE 143 10/15/2019     Lab Results   Component Value Date    LABA1C 5.6 06/15/2021     No results found for: EAG      Colon Cancer Screening - + sessile T/a FEB 2019, repeat 2 years per GI, Catia  Lung Cancer Screening (Age 54 to [de-identified] with 30 pack year hx, current smoker or quit within past 15 years) - NEG JAN 2021    Tetanus - to get at pharmacy per medicare rules  Influenza Vaccine - declines NOV 2020  Pneumonia Vaccine - UTD PPV 23 APR 2014  Zoster - to get at pharmacy per medicare rules    Breast Cancer Screening - NEG JAN 2021  Cervical Cancer Screening - NEG FEB 2019   Osteoporosis Screening - age 70      Specialist List  Neuro: Yayo  GI: Tamiko Bathe: [facility]  Cardio: [facility]  [facility]: [facility]      Current Outpatient Medications   Medication Sig Dispense Refill    varenicline (CHANTIX CONTINUING MONTH MARISOL) 1 MG tablet Take 1 tablet by mouth 2 times daily 60 tablet 1    varenicline (CHANTIX STARTING MONTH PAK) 0.5 MG X 11 & 1 MG X 42 tablet Take by mouth as directed on box 1 box 0    doxycycline hyclate (VIBRAMYCIN) 100 MG capsule Take 1 capsule by mouth 2 times daily for 10 days 20 capsule 0    traZODone (DESYREL) 50 MG tablet Take 1 tablet by mouth nightly 90 tablet 3    ondansetron (ZOFRAN-ODT) 4 MG disintegrating tablet Take by mouth      escitalopram (LEXAPRO) 10 MG tablet Take 1 tablet by mouth daily 90 tablet 3    Handicap Placard MISC by Does not apply route Expires 22 DEC 2025 1 each 0    pantoprazole (PROTONIX) 40 MG tablet Take 1 tablet by mouth every morning (before breakfast) 90 tablet 3    amLODIPine (NORVASC) 10 MG tablet Take 1 tablet by mouth daily 90 tablet 3    atorvastatin (LIPITOR) 80 MG tablet Take 1 tablet by mouth nightly 90 tablet 3    folic acid (FOLVITE) 1 MG tablet take 1 tablet by mouth once daily 90 tablet 3    albuterol sulfate HFA (PROAIR HFA) 108 (90 Base) MCG/ACT inhaler Inhale 2 puffs into the lungs every 6 hours as needed for Wheezing or Shortness of Breath 3 Inhaler 3    meloxicam (MOBIC) 15 MG tablet take 1 tablet by mouth once daily if needed for pain 30 tablet 3    famotidine (PEPCID) 20 MG tablet Take 1 tablet by mouth 2 times daily 180 tablet 3    clopidogrel (PLAVIX) 75 MG tablet Take 1 tablet by mouth daily 90 tablet 3    benzonatate (TESSALON) 100 MG capsule Take 1 capsule by mouth 3 times daily as needed for Cough 30 capsule 5    salmeterol (SEREVENT DISKUS) 50 MCG/DOSE diskus inhaler Inhale 1 puff into the lungs 2 times daily 1 each 3    acetaminophen (TYLENOL) 325 MG tablet Take 650-975 mg by mouth every 6 hours as needed for Pain       No current facility-administered medications for this visit. Orders Placed This Encounter   Medications    varenicline (CHANTIX CONTINUING MONTH MARISOL) 1 MG tablet     Sig: Take 1 tablet by mouth 2 times daily     Dispense:  60 tablet     Refill:  1    varenicline (CHANTIX STARTING MONTH MARISOL) 0.5 MG X 11 & 1 MG X 42 tablet     Sig: Take by mouth as directed on box     Dispense:  1 box     Refill:  0    doxycycline hyclate (VIBRAMYCIN) 100 MG capsule     Sig: Take 1 capsule by mouth 2 times daily for 10 days     Dispense:  20 capsule     Refill:  0         All medications reviewed and reconciled, including OTC and herbal medications. Updated list given to patient. Patient Active Problem List    Diagnosis Date Noted    Major depression     Prediabetes     Chronic bilateral low back pain with left-sided sciatica 06/12/2018    GERD (gastroesophageal reflux disease)     Former smoker     Osteoarthritis     Dyslipidemia 05/19/2017    History of stroke with residual deficit; cryptogenic 05/15/2017     Followed with neuro at [facility], last note states can f/u prn.  Also follows with cardio at [facility]        Obesity (BMI 30-39.9) 05/08/2014    Essential hypertension 2014    [email or web address] 2014    AAA (abdominal aortic aneurysm) ([email or web address] 2014     Follows with Dr. Anna mauricio      COPD (ch[facility] obstructive pulmonary disease) [facility])        Past Medical History:   Diagnosis Date    [facility]    Follows with Dr. Anna mauricio    Chronic pancreatitis ([email or web address]     COPD (chronic obstructive pulmonary disease) ([email or web address]     Dyslipidemia     Essential hypertension 2014    Former smoker     GERD (gastroesophageal reflux disease)     History of stroke with residual deficit; cryptogenic 2017    [facility]    Major depression     Obesity (BMI 30-39. 9)     Osteoarthritis     Prediabetes        Past Surgical History:   Procedure Laterality Date    ABDOMEN SURGERY      c sections x3    CARPAL TUNNEL RELEASE Bilateral      SECTION      x3    COLONOSCOPY      Dr. Leigh Mercado EGD  [id number]    INSERTABLE CARDIAC MONITOR      TONSILLECTOMY      as child    UPPER GASTROINTESTINAL ENDOSCOPY         Allergies   Allergen Reactions    Aspirin      Upset stomach    Flagyl [Metronidazole] Other (See Comments)     abd pain    Ibuprofen      Stomach pain    Spiriva Handihaler [Tiotropium Bromide Monohydrate] Other (See Comments)     Upper chest pain     Sulfa Antibiotics Other (See Comments)     PT WAS KID DOESNT KNOW       Social History     Tobacco Use    Smoking status: Current Some Day Smoker     Packs/day: 0.00     Years: 45.00     Pack years: 0.00     Types: Cigarettes     Last attempt to quit: 2018     Years since quittin.5    Smokeless tobacco: Never Used   Substance Use Topics    Alcohol use: No     Alcohol/week: 0.0 standard drinks       Family History   Problem Relation Age of Onset    Diabetes Maternal Grandmother     Diabetes Mother     Heart Disease Mother     Cancer Father         Something in the chest, pt not sure what    Heart Disease Father     COPD Father     Diabetes Maternal Aunt     High Blood Pressure Brother     Colon Cancer Other     Arthritis Neg Hx     Asthma Neg Hx     Birth Defects Neg Hx     Depression Neg Hx     Early Death Neg Hx     Hearing Loss Neg Hx     High Cholesterol Neg Hx     Kidney Disease Neg Hx     Learning Disabilities Neg Hx     Mental Illness Neg Hx     Mental Retardation Neg Hx     Miscarriages / Stillbirths Neg Hx     Stroke Neg Hx     Substance Abuse Neg Hx     Vision Loss Neg Hx     Breast Cancer Neg Hx          I have reviewed the patient's past medical history, past surgical history, allergies, medications, social and family history and I have made updates where appropriate. Review of Systems  Positive responses are highlighted in bold    Constitutional:  Fever, Chills, Night Sweats, Fatigue, Unexpected changes in weight  HENT:  Ear pain, Tinnitus, Nosebleeds, Trouble swallowing, Hearing loss, Sore throat  Cardiovascular:  Chest Pain, Palpitations, Orthopnea, Paroxysmal Nocturnal Dyspnea  Respiratory:  Cough, Wheezing, Shortness of breath, Chest tightness, Apnea  Gastrointestinal:  Nausea, Vomiting, Diarrhea, Constipation, Heartburn, Blood in stool  Genitourinary:  Difficulty or painful urination, Flank pain, Change in frequency, Urgency  Skin:  Color change, Rash, Itching, Wound  Musculoskeletal:  Joint pain, Back pain, Gait problems, Joint swelling, Myalgias  Neurological:  Dizziness, Headaches, Presyncope, Numbness, Seizures, Tremors  Endocrine:  Heat Intolerance, Cold Intolerance, Polydipsia, Polyphagia, Polyuria      PHYSICAL EXAM:  Vitals:    06/22/21 1001   BP: 132/84   Pulse: 63   Temp: 98.5 °F (36.9 °C)   TempSrc: Oral   SpO2: 99%   Weight: 182 lb (82.6 kg)   Height: 5' 5\" (1.651 m)     Body mass index is 30.29 kg/m².   Pain Score:   0 - No pain    VS Reviewed  General Appearance: A&O x 3, No acute distress,well developed and well- nourished  Eyes: pupils equal, mod  Overdue for f/u with vascular  Pt encouraged to make f/u with Dr. Lynsey Johnson soon    8. Chronic pancreatitis, unspecified pancreatitis type ([facility]    Stable  No sig sxs at present  Encouraged to f/u GI    9. Gastroesophageal reflux disease without esophagitis    Stable  con't pantorpazole    10. Obesity (BMI 30-39. 9)    Discussed wt loss stragegies    11. Prediabetes    Stable  con't TLC's  Labs stable  Again in 6 months    12. Cigarette nicotine dependence without complication    Resume chantix  F/u if unable to quit    - varenicline (CHANTIX CONTINUING MONTH PAK) 1 MG tablet; Take 1 tablet by mouth 2 times daily  Dispense: 60 tablet; Refill: 1  - varenicline (CHANTIX STARTING MONTH PAK) 0.5 MG X 11 & 1 MG X 42 tablet; Take by mouth as directed on box  Dispense: 1 box; Refill: 0    13. Acute rhinosinusitis    VSS  Exam reassuring  Doxy  F/u if no better  Reviewed ER precautions, pt understands. - doxycycline hyclate (VIBRAMYCIN) 100 MG capsule; Take 1 capsule by mouth 2 times daily for 10 days  Dispense: 20 capsule; Refill: 0      DISPOSITION    Return in about 5 months (around 11/6/2021) for AWV, follow-up on chronic medical conditions, sooner as needed. Sangeetha Santacruz released without restrictions. Future Appointments   Date Time Provider Kimber Jacinto   11/22/2021 10:00 AM Kira Saxena DO [facility] [address] [facility]     PATIENT COUNSELING    Discussed use, benefit, and side effects of prescribed medications. Barriers to medication compliance addressed. All patient questions answered. Pt voiced understanding.        Electronically signed by Kira Saxena DO on 6/22/2021 at 10:19 AM

## 2021-06-22 ENCOUNTER — OFFICE VISIT (OUTPATIENT)
Dept: FAMILY MEDICINE CLINIC | Age: 61
End: 2021-06-22
Payer: MEDICARE

## 2021-06-22 VITALS
HEIGHT: 65 IN | OXYGEN SATURATION: 99 % | SYSTOLIC BLOOD PRESSURE: 132 MMHG | DIASTOLIC BLOOD PRESSURE: 84 MMHG | HEART RATE: 63 BPM | WEIGHT: 182 LBS | TEMPERATURE: 98.5 F | BODY MASS INDEX: 30.32 KG/M2

## 2021-06-22 DIAGNOSIS — I69.30 HISTORY OF STROKE WITH RESIDUAL DEFICIT: ICD-10-CM

## 2021-06-22 DIAGNOSIS — K86.1 CHRONIC PANCREATITIS, UNSPECIFIED PANCREATITIS TYPE (HCC): ICD-10-CM

## 2021-06-22 DIAGNOSIS — K21.9 GASTROESOPHAGEAL REFLUX DISEASE WITHOUT ESOPHAGITIS: Chronic | ICD-10-CM

## 2021-06-22 DIAGNOSIS — I69.30 HISTORY OF STROKE WITH RESIDUAL DEFICIT: Chronic | ICD-10-CM

## 2021-06-22 DIAGNOSIS — I10 ESSENTIAL HYPERTENSION: ICD-10-CM

## 2021-06-22 DIAGNOSIS — G47.00 INSOMNIA, UNSPECIFIED TYPE: ICD-10-CM

## 2021-06-22 DIAGNOSIS — J01.90 ACUTE RHINOSINUSITIS: ICD-10-CM

## 2021-06-22 DIAGNOSIS — E66.9 OBESITY (BMI 30-39.9): ICD-10-CM

## 2021-06-22 DIAGNOSIS — R73.03 PREDIABETES: ICD-10-CM

## 2021-06-22 DIAGNOSIS — E78.5 DYSLIPIDEMIA: ICD-10-CM

## 2021-06-22 DIAGNOSIS — F17.210 CIGARETTE NICOTINE DEPENDENCE WITHOUT COMPLICATION: ICD-10-CM

## 2021-06-22 DIAGNOSIS — J44.9 CHRONIC OBSTRUCTIVE PULMONARY DISEASE, UNSPECIFIED COPD TYPE (HCC): ICD-10-CM

## 2021-06-22 DIAGNOSIS — I71.40 ABDOMINAL AORTIC ANEURYSM (AAA) WITHOUT RUPTURE: ICD-10-CM

## 2021-06-22 DIAGNOSIS — F33.42 RECURRENT MAJOR DEPRESSIVE DISORDER, IN FULL REMISSION (HCC): Primary | ICD-10-CM

## 2021-06-22 PROCEDURE — G8417 CALC BMI ABV UP PARAM F/U: HCPCS | Performed by: FAMILY MEDICINE

## 2021-06-22 PROCEDURE — 99214 OFFICE O/P EST MOD 30 MIN: CPT | Performed by: FAMILY MEDICINE

## 2021-06-22 PROCEDURE — 4004F PT TOBACCO SCREEN RCVD TLK: CPT | Performed by: FAMILY MEDICINE

## 2021-06-22 PROCEDURE — 3017F COLORECTAL CA SCREEN DOC REV: CPT | Performed by: FAMILY MEDICINE

## 2021-06-22 PROCEDURE — G8427 DOCREV CUR MEDS BY ELIG CLIN: HCPCS | Performed by: FAMILY MEDICINE

## 2021-06-22 PROCEDURE — 3023F SPIROM DOC REV: CPT | Performed by: FAMILY MEDICINE

## 2021-06-22 PROCEDURE — G8926 SPIRO NO PERF OR DOC: HCPCS | Performed by: FAMILY MEDICINE

## 2021-06-22 RX ORDER — VARENICLINE TARTRATE
KIT
Qty: 1 BOX | Refills: 0 | Status: SHIPPED | OUTPATIENT
Start: 2021-06-22 | End: 2021-12-08

## 2021-06-22 RX ORDER — VARENICLINE TARTRATE 1 MG/1
1 TABLET, FILM COATED ORAL 2 TIMES DAILY
Qty: 60 TABLET | Refills: 1 | Status: SHIPPED | OUTPATIENT
Start: 2021-06-22 | End: 2021-12-08

## 2021-06-22 RX ORDER — CLOPIDOGREL BISULFATE 75 MG/1
TABLET ORAL
Qty: 90 TABLET | Refills: 3 | Status: SHIPPED | OUTPATIENT
Start: 2021-06-22 | End: 2022-09-09

## 2021-06-22 RX ORDER — FAMOTIDINE 20 MG/1
TABLET, FILM COATED ORAL
Qty: 180 TABLET | Refills: 3 | Status: SHIPPED | OUTPATIENT
Start: 2021-06-22 | End: 2022-07-20

## 2021-06-22 RX ORDER — DOXYCYCLINE HYCLATE 100 MG/1
100 CAPSULE ORAL 2 TIMES DAILY
Qty: 20 CAPSULE | Refills: 0 | Status: SHIPPED | OUTPATIENT
Start: 2021-06-22 | End: 2021-07-02

## 2021-06-22 NOTE — PATIENT INSTRUCTIONS
quitting if you have help and support. · Join a support group, such as Nicotine Anonymous, for people who are trying to quit smoking. · Consider signing up for a smoking cessation program, such as the American Lung Association's Freedom from Smoking program.  · Get text messaging support. Go to the website at www.smokefree. gov to sign up for the [facility] program.  · Set a quit date. Pick your date carefully so that it is not right in the middle of a big deadline or stressful time. Once you quit, do not even take a puff. Get rid of all ashtrays and lighters after your last cigarette. Clean your house and your clothes so that they do not smell of smoke. · Learn how to be a nonsmoker. Think about ways you can avoid those things that make you reach for a cigarette. ? Avoid situations that put you at greatest risk for smoking. For some people, it is hard to have a drink with friends without smoking. For others, they might skip a coffee break with coworkers who smoke. ? Change your daily routine. Take a different route to work or eat a meal in a different place. · Cut down on stress. Calm yourself or release tension by doing an activity you enjoy, such as reading a book, taking a hot bath, or gardening. · Talk to your doctor or pharmacist about nicotine replacement therapy, which replaces the nicotine in your body. You still get nicotine but you do not use tobacco. Nicotine replacement products help you slowly reduce the amount of nicotine you need. These products come in several forms, many of them available over-the-counter:  ? Nicotine patches  ? Nicotine gum and lozenges  ? Nicotine inhaler  · Ask your doctor about bupropion (Wellbutrin) or varenicline (Chantix), which are prescription medicines. They do not contain nicotine. They help you by reducing withdrawal symptoms, such as stress and anxiety. · Some people find hypnosis, acupuncture, and massage helpful for ending the smoking habit.   · Eat a healthy diet and get regular exercise. Having healthy habits will help your body move past its craving for nicotine. · Be prepared to keep trying. Most people are not successful the first few times they try to quit. Do not get mad at yourself if you smoke again. Make a list of things you learned and think about when you want to try again, such as next week, next month, or next year. Where can you learn more? Go to https://[employer] Grav[employer]marielenaSpoonfedeb.[employer]. org and sign in to your [employer] account. Enter C194 in the [employer] box to learn more about \"Stopping Smoking: Care Instructions. \"     If you do not have an account, please click on the \"Sign Up Now\" link. Current as of: February 11, 2021               Content Version: 12.9  © 6691-0182 Healthwise, Incorporated. Care instructions adapted under license by [facility] ([facility]). If you have questions about a medical condition or this instruction, always ask your healthcare professional. [place] [address] any warranty or liability for your use of this information.

## 2021-10-10 ENCOUNTER — HOSPITAL ENCOUNTER (EMERGENCY)
Age: 61
Discharge: HOME | End: 2021-10-10
Payer: MEDICARE

## 2021-10-10 ENCOUNTER — APPOINTMENT (OUTPATIENT)
Dept: GENERAL RADIOLOGY | Age: 61
End: 2021-10-10
Payer: MEDICARE

## 2021-10-10 VITALS
HEART RATE: 77 BPM | WEIGHT: 184 LBS | DIASTOLIC BLOOD PRESSURE: 63 MMHG | RESPIRATION RATE: 16 BRPM | BODY MASS INDEX: 30.66 KG/M2 | SYSTOLIC BLOOD PRESSURE: 136 MMHG | OXYGEN SATURATION: 94 % | HEIGHT: 65 IN | TEMPERATURE: 97.1 F

## 2021-10-10 DIAGNOSIS — M79.641 RIGHT HAND PAIN: Primary | ICD-10-CM

## 2021-10-10 PROCEDURE — 99213 OFFICE O/P EST LOW 20 MIN: CPT | Performed by: NURSE PRACTITIONER

## 2021-10-10 PROCEDURE — 99213 OFFICE O/P EST LOW 20 MIN: CPT

## 2021-10-10 PROCEDURE — 73130 X-RAY EXAM OF HAND: CPT

## 2021-10-10 ASSESSMENT — PAIN SCALES - GENERAL: PAINLEVEL_OUTOF10: 10

## 2021-10-10 ASSESSMENT — PAIN DESCRIPTION - LOCATION: LOCATION: HAND

## 2021-10-10 NOTE — ED NOTES
Pt not in room to give discharge instructions, called pt on phone and pt states she left because she was cold and  Was in pain. Pt given discharge instructions per phone and voiced understanding.      Makeda Mccrary RN  10/10/21 0773

## 2021-10-10 NOTE — ED PROVIDER NOTES
[id number]       Chief Complaint   Patient presents with    Hand Pain     wrist and thumb, right       Nurses Notes reviewed and I agree except as noted in the HPI. HISTORY OF PRESENT ILLNESS   Amy Lino is a 56 y.o. female who presents evalaution of right hand pain and swelling that developed after playing with her grandson today. She denies any known injury or trauma. Thumb has been hurting for a while. REVIEW OF SYSTEMS     Review of Systems   Constitutional: Negative for chills and fever. Respiratory: Negative for cough. Cardiovascular: Negative for chest pain. Musculoskeletal: Positive for arthralgias. Skin: Negative for rash. Allergic/Immunologic: Negative for environmental allergies and food allergies. Neurological: Negative for headaches. PAST MEDICAL HISTORY         Diagnosis Date    AAA (abdominal aortic aneurysm) ([facility] 2014    Follows with Dr. Christiana Espinosa group    Chronic pancreatitis ([facility]     COPD (chronic obstructive pulmonary disease) ([facility]     Dyslipidemia     Essential hypertension 2014    Former smoker     GERD (gastroesophageal reflux disease)     History of stroke with residual deficit; cryptogenic 2017    Follows with neuro and cardio at [facility]    Major depression     Obesity (BMI 30-39. 9)     Osteoarthritis     Prediabetes        SURGICAL HISTORY     Patient  has a past surgical history that includes  section; Carpal tunnel release (Bilateral); Abdomen surgery; Tonsillectomy; Upper gastrointestinal endoscopy (); Colonoscopy (14,); Insertable Cardiac Monitor; and EGD (6813,6341).     CURRENT MEDICATIONS       Discharge Medication List as of 10/10/2021  3:19 PM      CONTINUE these medications which have NOT CHANGED    Details   clopidogrel (PLAVIX) 75 MG tablet take 1 tablet by mouth once daily, Disp-90 tablet, R-3Normal      famotidine (PEPCID) 20 MG tablet take 1 tablet by mouth twice a day, Disp-180 tablet, R-3Normal      varenicline (CHANTIX CONTINUING MONTH PAK) 1 MG tablet Take 1 tablet by mouth 2 times daily, Disp-60 tablet, R-1Normal      varenicline (CHANTIX STARTING MONTH PAK) 0.5 MG X 11 & 1 MG X 42 tablet Take by mouth as directed on box, Disp-1 box, R-0Normal      traZODone (DESYREL) 50 MG tablet Take 1 tablet by mouth nightly, Disp-90 tablet, R-3Normal      ondansetron (ZOFRAN-ODT) 4 MG disintegrating tablet Take by mouthHistorical Med      escitalopram (LEXAPRO) 10 MG tablet Take 1 tablet by mouth daily, Disp-90 tablet, R-3Normal      Handicap Placard MISC Starting Tue 12/22/2020, Disp-1 each, R-0, PrintExpires 22 DEC 2025      pantoprazole (PROTONIX) 40 MG tablet Take 1 tablet by mouth every morning (before breakfast), Disp-90 tablet,R-3Normal      amLODIPine (NORVASC) 10 MG tablet Take 1 tablet by mouth daily, Disp-90 tablet,R-3Normal      atorvastatin (LIPITOR) 80 MG tablet Take 1 tablet by mouth nightly, Disp-90 UCNTFS,[id number]      folic acid (FOLVITE) 1 MG tablet take 1 tablet by mouth once daily, Disp-90 tablet,R-3Normal      albuterol sulfate HFA (PROAIR HFA) 108 (90 Base) MCG/ACT inhaler Inhale 2 puffs into the lungs every 6 hours as needed for Wheezing or Shortness of Breath, Disp-3 Inhaler,R-3Normal      meloxicam (MOBIC) 15 MG tablet take 1 tablet by mouth once daily if needed for pain, Disp-30 tablet,R-3Normal      benzonatate (TESSALON) 100 MG capsule Take 1 capsule by mouth 3 times daily as needed for Cough, Disp-30 capsule, R-5Normal      salmeterol (SEREVENT DISKUS) 50 MCG/DOSE diskus inhaler Inhale 1 puff into the lungs 2 times daily, Disp-1 each, R-3Normal      acetaminophen (TYLENOL) 325 MG tablet Take 650-975 mg by mouth every 6 hours as needed for PainHistorical Med             ALLERGIES     Patient is is allergic to aspirin, flagyl [metronidazole], ibuprofen, spiriva handihaler [tiotropium bromide monohydrate], and sulfa antibiotics. FAMILY HISTORY     Patient'sfamily history includes COPD in her father; Cancer in her father; Colon Cancer in an other family member; Diabetes in her maternal aunt, maternal grandmother, and mother; Heart Disease in her father and mother; High Blood Pressure in her brother. SOCIAL HISTORY     Patient  reports that she has been smoking cigarettes. She has been smoking about 0.00 packs per day for the past 45.00 years. She has never used smokeless tobacco. She reports current drug use. Drug: Marijuana. She reports that she does not drink alcohol. PHYSICAL EXAM     ED TRIAGE VITALS  BP: 136/63, Temp: 97.1 °F (36.2 °C), Pulse: 77, Resp: 16, SpO2: 94 %  Physical Exam  Vitals and nursing note reviewed. Constitutional:       General: She is not in acute distress. Appearance: Normal appearance. She is well-developed and well-groomed. HENT:      Head: Normocephalic and atraumatic. Right Ear: External ear normal.      Left Ear: External ear normal.      Mouth/Throat:      Lips: Pink. Mouth: Mucous membranes are moist.   Eyes:      Conjunctiva/sclera: Conjunctivae normal.      Right eye: Right conjunctiva is not injected. Left eye: Left conjunctiva is not injected. Pupils: Pupils are equal.   Cardiovascular:      Rate and Rhythm: Normal rate. Pulmonary:      Effort: Pulmonary effort is normal. No respiratory distress. Musculoskeletal:      Cervical back: Normal range of motion. Comments: Right hand with minimal swelling on the dorsal side, no evidence of injury or trauma, no evidence of ecchymosis. No neurovascular compromise. Patient able to perform range of motion although she states painful when doing so. Skin:     General: Skin is warm and dry. Findings: Rash present. Neurological:      Mental Status: She is alert and oriented to person, place, and time.    Psychiatric:         Mood and Affect: Mood normal.         Speech: Speech normal.         Behavior: Behavior is cooperative. DIAGNOSTIC RESULTS   Labs:  Abnormal Labs Reviewed - No data to display     IMAGING:  XR HAND RIGHT (MIN 3 VIEWS)   Final Result         1. No acute findings. 2. Mild degenerative changes in the 1st IP joint, MCP joint and CMC joint. 3. Mild degenerative changes in the 2nd and 3rd DIP joints. **This report has been created using voice recognition software. It may contain minor errors which are inherent in voice recognition technology. **      Final report electronically signed by Dr. Ravindra Yen on 10/10/2021 3:04 PM        URGENT CARE COURSE:     Vitals:    10/10/21 1333   BP: 136/63   Pulse: 77   Resp: 16   Temp: 97.1 °F (36.2 °C)   TempSrc: Temporal   SpO2: 94%   Weight: 184 lb (83.5 kg)   Height: 5' 5\" (1.651 m)       Medications - No data to display  PROCEDURES:  FINALIMPRESSION      1. Right hand pain        DISPOSITION/PLAN   DISPOSITION Decision To Discharge 10/10/2021 03:16:34 PM    X-ray showing degenerative changes. Went to room to discuss test and discharge instructions, patient was not in room. Physical assessment findings, diagnostic testing(s) if applicable, and vital signs reviewed with patient/patient representative. If applicable, patient/patient representative will be contacted upon receipt of final culture and sensitivity or other testing results when available. Any additions or changes to medications or changes the plan of care will be made at that time. Differential diagnosis(s) discussed with patient/patient representative. Patient is to follow-up with family care provider in 2-3 days if no improvement. Patient is to go to the emergency department if symptoms change/worsen. Patient/patient representative is aware of care plan, questions answered, verbalizes understanding and is in agreement. Printed instructions attached to after visit summary.          Problem List Items Addressed This Visit     None      Visit Diagnoses     Right hand pain    -  Primary          PATIENT REFERRED TO:  Gaylin Lesches Dr. Osa Browning  [phone]    Schedule an appointment as soon as possible for a visit in 3 days  For further evaluation. , If symptoms change/worsen, go to the [address] [place] [address] Urgent Care  [id number] [facility]  [phone]    as needed, If symptoms change/worsen, go to the [id number] [facility], [address] Neeraj Gonzalez, APRN - CNP  10/12/21 0880

## 2021-10-12 ASSESSMENT — ENCOUNTER SYMPTOMS: COUGH: 0

## 2021-10-15 ENCOUNTER — TELEPHONE (OUTPATIENT)
Dept: FAMILY MEDICINE CLINIC | Age: 61
End: 2021-10-15

## 2021-10-15 DIAGNOSIS — I10 ESSENTIAL HYPERTENSION: Primary | ICD-10-CM

## 2021-10-15 DIAGNOSIS — R73.03 PREDIABETES: ICD-10-CM

## 2021-10-15 DIAGNOSIS — R73.9 HYPERGLYCEMIA: ICD-10-CM

## 2021-10-15 NOTE — TELEPHONE ENCOUNTER
Pt due for fasting labs prior to next apt on 11/22/2021. Please call to have pt complete this. Thanks! ASSESSMENT & PLAN   Diagnosis Orders   1. Essential hypertension  CBC Auto Differential    Comprehensive Metabolic Panel    Hemoglobin A1C    Lipid Panel    Microalbumin / Creatinine Urine Ratio    TSH with Reflex   2. Prediabetes  CBC Auto Differential    Comprehensive Metabolic Panel    Hemoglobin A1C    Lipid Panel    Microalbumin / Creatinine Urine Ratio    TSH with Reflex   3.  Hyperglycemia  CBC Auto Differential    Comprehensive Metabolic Panel    Hemoglobin A1C    Lipid Panel    Microalbumin / Creatinine Urine Ratio    TSH with Reflex     Future Appointments   Date Time Provider Kimber Jacinto   11/22/2021 10:00 AM [id number] [facility]

## 2021-11-15 DIAGNOSIS — I69.30 HISTORY OF STROKE WITH RESIDUAL DEFICIT: ICD-10-CM

## 2021-11-15 DIAGNOSIS — E78.5 DYSLIPIDEMIA: ICD-10-CM

## 2021-11-15 RX ORDER — FOLIC ACID 1 MG/1
TABLET ORAL
Qty: 90 TABLET | Refills: 3 | Status: SHIPPED | OUTPATIENT
Start: 2021-11-15

## 2021-11-15 RX ORDER — ATORVASTATIN CALCIUM 80 MG/1
TABLET, FILM COATED ORAL
Qty: 90 TABLET | Refills: 3 | Status: SHIPPED | OUTPATIENT
Start: 2021-11-15

## 2021-11-15 NOTE — TELEPHONE ENCOUNTER
Recent Visits  Date Type Provider Dept   06/22/21 Office Visit Kenny Farr, DO [facility]   12/22/20 Office Visit Kenny Farr, DO [facility]   11/04/20 Office Visit Kenny Farr, DO [facility]   Showing recent visits within past 540 days with a meds authorizing provider and meeting all other requirements  Future Appointments  Date Type Provider Dept   11/22/21 Appointment Kenny Farr, [address] [place] [address]   Showing future appointments within next 150 days with a meds authorizing provider and meeting all other requirements        Future Appointments   Date Time Provider Kimber Jacinto   11/22/2021 10:00 AM [address] [facility]

## 2021-11-23 ENCOUNTER — TELEPHONE (OUTPATIENT)
Dept: FAMILY MEDICINE CLINIC | Age: 61
End: 2021-11-23

## 2021-11-23 NOTE — TELEPHONE ENCOUNTER
----- Message from Anjelica Tyler DO sent at 11/23/2021  7:58 AM EST -----  Pt missed her apt yesterday  Please help her r/s for the next few wks, if able  Thanks!

## 2021-11-24 NOTE — TELEPHONE ENCOUNTER
Future Appointments   Date Time Provider Kimber Jacinto   12/8/2021 10:20 AM Jazmin Cruz, [address] [place] [address]

## 2021-12-06 ENCOUNTER — TELEPHONE (OUTPATIENT)
Dept: FAMILY MEDICINE CLINIC | Age: 61
End: 2021-12-06

## 2021-12-06 ENCOUNTER — HOSPITAL ENCOUNTER (OUTPATIENT)
Age: 61
Discharge: HOME | End: 2021-12-06
Payer: MEDICARE

## 2021-12-06 DIAGNOSIS — R73.9 HYPERGLYCEMIA: ICD-10-CM

## 2021-12-06 DIAGNOSIS — R73.03 PREDIABETES: ICD-10-CM

## 2021-12-06 DIAGNOSIS — I10 ESSENTIAL HYPERTENSION: ICD-10-CM

## 2021-12-06 LAB
ALBUMIN SERPL-MCNC: 4.3 G/DL (ref 3.5–5.1)
ALP BLD-CCNC: 135 U/L (ref 38–126)
ALT SERPL-CCNC: 11 U/L (ref 11–66)
ANION GAP SERPL CALCULATED.3IONS-SCNC: 11 MEQ/L (ref 8–16)
AST SERPL-CCNC: 14 U/L (ref 5–40)
AVERAGE GLUCOSE: 114 MG/DL (ref 70–126)
BASOPHILS # BLD: 0.4 %
BASOPHILS ABSOLUTE: 0 THOU/MM3 (ref 0–0.1)
BILIRUB SERPL-MCNC: 0.5 MG/DL (ref 0.3–1.2)
BUN BLDV-MCNC: 12 MG/DL (ref 7–22)
CALCIUM SERPL-MCNC: 9.6 MG/DL (ref 8.5–10.5)
CHLORIDE BLD-SCNC: 107 MEQ/L (ref 98–111)
CHOLESTEROL, TOTAL: 137 MG/DL (ref 100–199)
CO2: 26 MEQ/L (ref 23–33)
CREAT SERPL-MCNC: 1 MG/DL (ref 0.4–1.2)
CREATININE, URINE: 408 MG/DL
EOSINOPHIL # BLD: 1.4 %
EOSINOPHILS ABSOLUTE: 0.1 THOU/MM3 (ref 0–0.4)
ERYTHROCYTE [DISTWIDTH] IN BLOOD BY AUTOMATED COUNT: 13.9 % (ref 11.5–14.5)
ERYTHROCYTE [DISTWIDTH] IN BLOOD BY AUTOMATED COUNT: 47.1 FL (ref 35–45)
GFR SERPL CREATININE-BSD FRML MDRD: 56 ML/MIN/1.73M2
GLUCOSE BLD-MCNC: 110 MG/DL (ref 70–108)
HBA1C MFR BLD: 5.8 % (ref 4.4–6.4)
HCT VFR BLD CALC: 45.7 % (ref 37–47)
HDLC SERPL-MCNC: 48 MG/DL
HEMOGLOBIN: 14.8 GM/DL (ref 12–16)
IMMATURE GRANS (ABS): 0.02 THOU/MM3 (ref 0–0.07)
IMMATURE GRANULOCYTES: 0.3 %
LDL CHOLESTEROL CALCULATED: 67 MG/DL
LYMPHOCYTES # BLD: 25.9 %
LYMPHOCYTES ABSOLUTE: 2 THOU/MM3 (ref 1–4.8)
MCH RBC QN AUTO: 30 PG (ref 26–33)
MCHC RBC AUTO-ENTMCNC: 32.4 GM/DL (ref 32.2–35.5)
MCV RBC AUTO: 92.7 FL (ref 81–99)
MICROALBUMIN UR-MCNC: 4.03 MG/DL
MICROALBUMIN/CREAT UR-RTO: 10 MG/G (ref 0–30)
MONOCYTES # BLD: 6.6 %
MONOCYTES ABSOLUTE: 0.5 THOU/MM3 (ref 0.4–1.3)
NUCLEATED RED BLOOD CELLS: 0 /100 WBC
PLATELET # BLD: 224 THOU/MM3 (ref 130–400)
PMV BLD AUTO: 10.4 FL (ref 9.4–12.4)
POTASSIUM SERPL-SCNC: 4.1 MEQ/L (ref 3.5–5.2)
RBC # BLD: 4.93 MILL/MM3 (ref 4.2–5.4)
SEG NEUTROPHILS: 65.4 %
SEGMENTED NEUTROPHILS ABSOLUTE COUNT: 5.1 THOU/MM3 (ref 1.8–7.7)
SODIUM BLD-SCNC: 144 MEQ/L (ref 135–145)
TOTAL PROTEIN: 6.7 G/DL (ref 6.1–8)
TRIGL SERPL-MCNC: 112 MG/DL (ref 0–199)
TSH SERPL DL<=0.05 MIU/L-ACNC: 2.16 UIU/ML (ref 0.4–4.2)
WBC # BLD: 7.8 THOU/MM3 (ref 4.8–10.8)

## 2021-12-06 PROCEDURE — 85025 COMPLETE CBC W/AUTO DIFF WBC: CPT

## 2021-12-06 PROCEDURE — 80053 COMPREHEN METABOLIC PANEL: CPT

## 2021-12-06 PROCEDURE — 82043 UR ALBUMIN QUANTITATIVE: CPT

## 2021-12-06 PROCEDURE — 80061 LIPID PANEL: CPT

## 2021-12-06 PROCEDURE — 84443 ASSAY THYROID STIM HORMONE: CPT

## 2021-12-06 PROCEDURE — 83036 HEMOGLOBIN GLYCOSYLATED A1C: CPT

## 2021-12-06 PROCEDURE — 36415 COLL VENOUS BLD VENIPUNCTURE: CPT

## 2021-12-06 NOTE — TELEPHONE ENCOUNTER
----- Message from Hailey Rizzo, DO sent at 12/6/2021 12:44 PM EST -----  Please let pt know that labs are stable and appropriate. Let me know if questions, thanks!

## 2021-12-07 NOTE — PROGRESS NOTES
follow with Marguerite KOVACS Severe. Not seen in a while. However, denies abd pain or diarrhea. -GERD:    HPI:    Taking meds as prescribed ?: yes  Tolerating well ?: yes  Side Effects ?: denies  Dysphagia ?: denies  Odynophagia ?: denies  Melena ?: denies  Working on TLCS ?: yes      -Obesity: working on wt loss, wts stable. Diet ok. Not exercising      -PreDM:  Stable  Labs reviewed today  Working on lifestyle changes      -R wrist pain:  Going on the last few months  Thinks strained it a few months ago, hyper-extended on her bed  Better at rest  Worse with movement and touch  No falls.       -Smoking: On 5 cig per day  Not quite ready to quit      Age/Gender Health Maintenance    Lipid -   Lab Results   Component Value Date    CHOL 137 12/06/2021    CHOL 161 11/05/2020    CHOL 172 01/09/2019     Lab Results   Component Value Date    TRIG 112 12/06/2021    TRIG 154 11/05/2020    TRIG 148 01/09/2019     Lab Results   Component Value Date    HDL 48 12/06/2021    HDL 54 11/05/2020    HDL 47 01/09/2019     Lab Results   Component Value Date    LDLCALC 67 12/06/2021    LDLCALC 76 11/05/2020    [address] 01/09/2019     Lab Results   Component Value Date    VLDL 33 12/14/2011     No results found for: CHOLHDLRATIO      DM Screen - 101 fasting, (June 2018)  Lab Results   Component Value Date    GLUCOSE 110 12/06/2021    GLUCOSE 143 10/15/2019     Lab Results   Component Value Date    LABA1C 5.8 12/06/2021    LABA1C 5.6 06/15/2021    LABA1C 6.1 11/05/2020    LABA1C 6.3 01/22/2019    LABA1C 5.6 05/15/2017    LABA1C 5.8 04/10/2014       Colon Cancer Screening - + sessile T/a FEB 2019, repeat 2 years per Catia KOVACS.  Pt will call Catia's office to setup a f/u apt ([facility])  Lung Cancer Screening (Age 52 to [de-identified] with 30 pack year hx, current smoker or quit within past 15 years) - NEG JAN 2021    Tetanus - to get at pharmacy per medicare rules  Influenza Vaccine - declines DEC 2021  Pneumonia Vaccine - UTD PPV 23 APR 2014  Zoster - to get at pharmacy per medicare rules    Breast Cancer Screening - NEG JAN 2021  Cervical Cancer Screening - NEG FEB 2019   Osteoporosis Screening - age 70      Specialist List  Neuro: Yayo  GI: Ángela Justin: [facility]  Cardio: [facility]  [facility]: [facility]      Current Outpatient Medications   Medication Sig Dispense Refill    atorvastatin (LIPITOR) 80 MG tablet take 1 tablet by mouth once daily at bedtime 90 tablet 3    folic acid (FOLVITE) 1 MG tablet take 1 tablet by mouth once daily 90 tablet 3    clopidogrel (PLAVIX) 75 MG tablet take 1 tablet by mouth once daily 90 tablet 3    famotidine (PEPCID) 20 MG tablet take 1 tablet by mouth twice a day 180 tablet 3    traZODone (DESYREL) 50 MG tablet Take 1 tablet by mouth nightly 90 tablet 3    ondansetron (ZOFRAN-ODT) 4 MG disintegrating tablet Take by mouth      escitalopram (LEXAPRO) 10 MG tablet Take 1 tablet by mouth daily 90 tablet 3    pantoprazole (PROTONIX) 40 MG tablet Take 1 tablet by mouth every morning (before breakfast) 90 tablet 3    amLODIPine (NORVASC) 10 MG tablet Take 1 tablet by mouth daily 90 tablet 3    albuterol sulfate HFA (PROAIR HFA) 108 (90 Base) MCG/ACT inhaler Inhale 2 puffs into the lungs every 6 hours as needed for Wheezing or Shortness of Breath 3 Inhaler 3    meloxicam (MOBIC) 15 MG tablet take 1 tablet by mouth once daily if needed for pain 30 tablet 3    salmeterol (SEREVENT DISKUS) 50 MCG/DOSE diskus inhaler Inhale 1 puff into the lungs 2 times daily 1 each 3    acetaminophen (TYLENOL) 325 MG tablet Take 650-975 mg by mouth every 6 hours as needed for Pain       No current facility-administered medications for this visit. No orders of the defined types were placed in this encounter. All medications reviewed and reconciled, including OTC and herbal medications. Updated list given to patient.        Patient Active Problem List    Diagnosis Date Noted    Major depression     Prediabetes     Chronic bilateral low back pain with left-sided sciatica 2018    GERD (gastroesophageal reflux disease)     Former smoker     Osteoarthritis     Dyslipidemia 2017    History of stroke with residual deficit; cryptogenic 05/15/2017     Followed with neuro at [facility], last note states can f/u prn. Also follows with cardio at [facility]        Obesity (BMI 30-39.9) 2014    Essential hypertension 2014    Chronic pancreatitis ([facility] 2014    AAA (abdominal aortic aneurysm) ([facility] 2014     Follows with Dr. Salima mauricio      COPD (chronic obstructive pulmonary disease) [facility])        Past Medical History:   Diagnosis Date    [facility]    Follows with Dr. Salima mauricio    [facility]     COPD (chronic obstructive pulmonary disease) ([facility]     Dyslipidemia     Essential hypertension 2014    Former smoker     GERD (gastroesophageal reflux disease)     History of stroke with residual deficit; cryptogenic 2017    Follows with neuro and cardio at [facility]    Major depression     Obesity (BMI 30-39. 9)     Osteoarthritis     Prediabetes        Past Surgical History:   Procedure Laterality Date    ABDOMEN SURGERY      c sections x3    CARPAL TUNNEL RELEASE Bilateral      SECTION      x3    COLONOSCOPY      Dr. Alonso Means EGD  [phone]    INSERTABLE CARDIAC MONITOR      TONSILLECTOMY      as child    UPPER GASTROINTESTINAL ENDOSCOPY  2014       Allergies   Allergen Reactions    Aspirin      Upset stomach    Flagyl [Metronidazole] Other (See Comments)     abd pain    Ibuprofen      Stomach pain    Spiriva Handihaler [Tiotropium Bromide Monohydrate] Other (See Comments)     Upper chest pain     Sulfa Antibiotics Other (See Comments)     PT WAS KID DOESNT KNOW       Social History     Tobacco Use    Smoking status: Current Every Day Smoker     Packs/day: 0.00     Years: 45.00     Pack years: 0.00     Types: Cigarettes     Last attempt to quit: 12/9/2018     Years since quitting: 3.0    Smokeless tobacco: Never Used   Substance Use Topics    Alcohol use: No     Alcohol/week: 0.0 standard drinks       Family History   Problem Relation Age of Onset    Diabetes Maternal Grandmother     Diabetes Mother     Heart Disease Mother    Haily Aldana Cancer Father         Something in the chest, pt not sure what    Heart Disease Father     COPD Father     Diabetes Maternal Aunt     High Blood Pressure Brother     Colon Cancer Other     Arthritis Neg Hx     Asthma Neg Hx     Birth Defects Neg Hx     Depression Neg Hx     Early Death Neg Hx     Hearing Loss Neg Hx     High Cholesterol Neg Hx     Kidney Disease Neg Hx     Learning Disabilities Neg Hx     Mental Illness Neg Hx     Mental Retardation Neg Hx     Miscarriages / Stillbirths Neg Hx     Stroke Neg Hx     Substance Abuse Neg Hx     Vision Loss Neg Hx     Breast Cancer Neg Hx          I have reviewed the patient's past medical history, past surgical history, allergies, medications, social and family history and I have made updates where appropriate.       Review of Systems  Positive responses are highlighted in bold    Constitutional:  Fever, Chills, Night Sweats, Fatigue, Unexpected changes in weight  HENT:  Ear pain, Tinnitus, Nosebleeds, Trouble swallowing, Hearing loss, Sore throat  Cardiovascular:  Chest Pain, Palpitations, Orthopnea, Paroxysmal Nocturnal Dyspnea  Respiratory:  Cough, Wheezing, Shortness of breath, Chest tightness, Apnea  Gastrointestinal:  Nausea, Vomiting, Diarrhea, Constipation, Heartburn, Blood in stool  Genitourinary:  Difficulty or painful urination, Flank pain, Change in frequency, Urgency  Skin:  Color change, Rash, Itching, Wound  Musculoskeletal:  Joint pain, Back pain, Gait problems, Joint swelling, Myalgias  Neurological:  Dizziness, Headaches, Presyncope, Numbness, Seizures, Tremors  Endocrine:  Heat Intolerance, Cold Intolerance, Polydipsia, Polyphagia, is intact to the thumb. Phalen's test negative. Tinnel's test negative. Finkelstein's test negative. Skin color, texture, turgor is normal.  No rashes or lesions found of the injured extremity. Vascular exam shows normal  And good capillary refill bilaterally. Sensation is subjectively normal in the whole hand. Digits are normally sensate. ASSESSMENT & PLAN  1. Recurrent major depressive disorder, in full remission ([facility]    Stable  con't lexapro at present dose  con't trazodone for insomnia, working well    2. Insomnia, unspecified type    As above    3. Chronic obstructive pulmonary disease, unspecified COPD type ([facility]    Stable  con't meds, prn alb and serevent  Declines PFT order today  Discuss again next visit    4. History of stroke with residual deficit    con't tertiary prevention  Neuro released pt to f/u prn    5. Essential hypertension    At goal  con't norvasc  Labs UTD    6. Dyslipidemia    Stable  con't lipitor    7. Abdominal aortic aneurysm (AAA) without rupture (HCC)    Con't RF mod  Due for f/u US  If stable, con't to monitor  If changing, get set back up with vascular    - [address]; Future    8. Chronic pancreatitis, unspecified [facility]itis [facility] ([facility]    Stable  No sig sxs at present  Encouraged to f/u GI    9. Gastroesophageal reflux disease without esophagitis    Stable  con't pantorpazole    10. Obesity (BMI 30-39. 9)    Discussed wt loss stragegies    11. Prediabetes    Stable  con't lifestyle changes  Labs doing well    12. Chronic pain of right wrist    Unclear etiology  Get xray  If neg, refer to [facility]  con't prn tylenol    - XR WRIST RIGHT (MIN 3 VIEWS); Future    13. Cigarette nicotine dependence without complication    In precontemplation stage and not ready to quit. Declines cessation, aware of risks of continued smoking as well as resources available to help quit. These include tobacco cessation classes as well as 1-800-QUIT NOW.  No barriers other than lack resulting in life expectancy of < 10 years, or that would preclude treatment of an abnormality identified on CT, should not be screened due to lack of benefit. To obtain maximal benefit from this screening, smoking cessation and long-term abstinence from smoking is critical      Electronically signed by Ana Ash DO on 2021 at 10:44 AM        Medicare Annual Wellness Visit  Name: Ervin Nunes Date: 2021   MRN: [id number] Sex: Female   Age: 59 y.o. Ethnicity: Non- / Non    : 1960 Race: White (non-)      Vernon Shetty is here for Medicare AWV, Joint Pain (R wrist), and Follow-up (Chronic issues as noted below)    Screenings for behavioral, psychosocial and functional/safety risks, and cognitive dysfunction are all negative except as indicated below. These results, as well as other patient data from the [address] form, are documented in Flowsheets linked to this Encounter. Allergies   Allergen Reactions    Aspirin      Upset stomach    Flagyl [Metronidazole] Other (See Comments)     abd pain    Ibuprofen      Stomach pain    Spiriva Handihaler [Tiotropium Bromide Monohydrate] Other (See Comments)     Upper chest pain     Sulfa Antibiotics Other (See Comments)     PT WAS KID DOESNT KNOW         Prior to Visit Medications    Medication Sig Taking?  Authorizing Provider   atorvastatin (LIPITOR) 80 MG tablet take 1 tablet by mouth once daily at bedtime Yes Ana Ash, DO   folic acid (FOLVITE) 1 MG tablet take 1 tablet by mouth once daily Yes Ana Ash DO   clopidogrel (PLAVIX) 75 MG tablet take 1 tablet by mouth once daily Yes Ana Ash, DO   famotidine (PEPCID) 20 MG tablet take 1 tablet by mouth twice a day Yes Ana Ash DO   traZODone (DESYREL) 50 MG tablet Take 1 tablet by mouth nightly Yes Amador Miles, DO   ondansetron (ZOFRAN-ODT) 4 MG disintegrating tablet Take by mouth Yes Historical Provider, MD   escitalopram (LEXAPRO) 10 MG tablet Take 1 tablet by mouth daily Yes Amador Miles DO   pantoprazole (PROTONIX) 40 MG tablet Take 1 tablet by mouth every morning (before breakfast) Yes Amador Miles DO   amLODIPine (NORVASC) 10 MG tablet Take 1 tablet by mouth daily Yes Amador Miles, DO   albuterol sulfate HFA (PROAIR HFA) 108 (90 Base) MCG/ACT inhaler Inhale 2 puffs into the lungs every 6 hours as needed for Wheezing or Shortness of Breath Yes Reginaldo Manley, DO   meloxicam (MOBIC) 15 MG tablet take 1 tablet by mouth once daily if needed for pain Yes Amador Miles DO   salmeterol (SEREVENT DISKUS) 50 MCG/DOSE diskus inhaler Inhale 1 puff into the lungs 2 times daily Yes Reginaldo Manley DO   acetaminophen (TYLENOL) 325 MG tablet Take 650-975 mg by mouth every 6 hours as needed for Pain Yes Historical Provider, MD         Past Medical History:   Diagnosis Date    AAA [facility]abdominal aortic [facility]) ([facility] 2014    Follow[facility] with Dr. [facility]    Chronic [facility]itis ([facility]     COPD (chronic obstructive pulmonary disease) ([facility]     Dyslipidemia     Essential hypertension 2014    Former smoker     GERD (gastroesophageal reflux disease)     History of stroke with residual deficit; cryptogenic 2017    Follows with neuro and [facility] at [facility]    Major depression     Obesity (BMI 30-39. 9)     Osteoarthritis     Prediabetes        Past Surgical History:   Procedure Laterality Date    ABDOMEN SURGERY      c sections x3    CARPAL TUNNEL RELEASE Bilateral      SECTION      x3    COLONOSCOPY      Dr. Karen Montanez EGD  [phone]    INSERTABLE CARDIAC MONITOR      TONSILLECTOMY      as child    UPPER GASTROINTESTINAL ENDOSCOPY           Family History   Problem Relation Age of Onset    Diabetes Maternal Grandmother     Diabetes Mother     Heart Disease Mother     Cancer Father         Something in the chest, pt not sure what  Heart Disease Father     COPD Father     Diabetes Maternal Aunt     High Blood Pressure Brother     Colon Cancer Other     Arthritis Neg Hx     Asthma Neg Hx     Birth Defects Neg Hx     Depression Neg Hx     Early Death Neg Hx     Hearing Loss Neg Hx     High Cholesterol Neg Hx     Kidney Disease Neg Hx     Learning Disabilities Neg Hx     Mental Illness Neg Hx     Mental Retardation Neg Hx     Miscarriages / Stillbirths Neg Hx     Stroke Neg Hx     Substance Abuse Neg Hx     Vision Loss Neg Hx     Breast Cancer Neg Hx        CareTeam (Including outside providers/suppliers regularly involved in providing care):   Patient Care Team:  Khloe Wilson DO as PCP - General (Family Medicine)  Khloe Wilson DO as PCP - [facility] Empaneled Provider  Araseli Jewell MD as Consulting Physician (Pulmonology)  Chuy Goldman RN as Nurse Navigator    Wt Readings from Last 3 Encounters:   12/08/21 178 lb 3.2 oz (80.8 kg)   10/10/21 184 lb (83.5 kg)   06/22/21 182 lb (82.6 kg)     Vitals:    12/08/21 1020   BP: 136/66   Pulse: 88   Resp: 14   Temp: 98.5 °F (36.9 °C)   TempSrc: Oral   SpO2: 95%   Weight: 178 lb 3.2 oz (80.8 kg)   Height: 5' 4.5\" (1.638 m)     Body mass index is 30.12 kg/m². Based upon direct observation of the patient, evaluation of cognition reveals recent and remote memory intact. Patient's complete Health Risk Assessment and screening values have been reviewed and are found in Flowsheets. The following problems were reviewed today and where indicated follow up appointments were made and/or referrals ordered.     Positive Risk Factor Screenings with Interventions:         Substance History:  Social History     Tobacco History     Smoking Status  Current Every Day Smoker Last attempt to quit  12/9/2018 Smoking Frequency  0 packs/day for 45 years (0 pk yrs) Smoking Tobacco Type  Cigarettes    Smokeless Tobacco Use  Never Used          Alcohol History     Alcohol Use Status  No          Drug Use     Drug Use Status  Yes Types  Marijuana (Weed) Comment  daily          Sexual Activity     Sexually Active  Yes Partners  Male               Alcohol Screening:       A score of 8 or more is associated with harmful or hazardous drinking. A score of 13 or more in women, and 15 or more in men, is likely to indicate alcohol dependence. Substance Abuse Interventions:  · Tobacco abuse:  tobacco cessation tips and resources provided    General Health and ACP:  General  In general, how would you say your health is?: Fair  In the past 7 days, have you experienced any of the following?  New or Increased Pain, New or Increased Fatigue, Loneliness, Social Isolation, Stress or Anger?: (!) Stress  Do you get the social and emotional support that you need?: Yes  Do you have a Living Will?: Yes  Advance Directives     Power of  Living Will ACP-Advance Directive ACP-Power of     Not on File [place] on 04/03/14 Filed [address] [place] Risk Interventions:  · Stress: patient declines any further evaluation/treatment for this issue    Health Habits/Nutrition:  Health Habits/Nutrition  Do you exercise for at least 20 minutes 2-3 times per week?: Yes  Have you lost any weight without trying in the past 3 months?: No  Do you eat only one meal per day?: No  Have you seen the dentist within the past year?: (!) No  Body mass index: (!) 30.11  Health Habits/Nutrition Interventions:  · Nutritional issues:  educational materials for healthy, well-balanced diet provided  · Dental exam overdue:  patient encouraged to make appointment with his/her dentist    Hearing/Vision:  No exam data present  Hearing/Vision  Do you or your family notice any trouble with your hearing that hasn't been managed with hearing aids?: No  Do you have difficulty driving, watching TV, or doing any of your daily activities because of your eyesight?: No  Have you had an eye exam within the past year?: (!) No  Hearing/Vision Interventions:  · Vision concerns:  patient encouraged to make appointment with his/her eye specialist      Personalized Preventive Plan   Current Health Maintenance Status  Immunization History   Administered Date(s) Administered    COVID-19, J&J, PF, 0.5 mL 03/16/2021    Pneumococcal Polysaccharide ([id number]) 04/04/2014        Health Maintenance   Topic Date Due    HIV screen  Never done    DTaP/Tdap/Td vaccine (1 - Tdap) Never done    Shingles Vaccine (1 of 2) Never done    Colon cancer screen colonoscopy  02/07/2021    COVID-19 Vaccine (2 - Booster for NASOFORM series) 05/11/2021    Flu vaccine (1) Never done   [employer] Visit (AWV)  11/05/2021    Breast cancer screen  01/12/2022    Low dose CT lung screening  01/12/2022    Cervical cancer screen  02/11/2022    A1C test (Diabetic or Prediabetic)  12/06/2022    Lipid screen  12/06/2022    Potassium monitoring  12/06/2022    Creatinine monitoring  12/06/2022    Pneumococcal 0-64 years Vaccine (2 of 2 - PPSV23) 10/20/2023    Hepatitis C screen  Completed    Hepatitis A vaccine  Aged Out    Hepatitis B vaccine  Aged Out    Hib vaccine  Aged Out    Meningococcal (ACWY) vaccine  Aged Out     Recommendations for [place] Due: see orders and patient instructions/AVS.  . Recommended screening schedule for the next 5-10 years is provided to the patient in written form: see Patient Juanito Navarro was seen today for medicare awv, joint pain and health maintenance. Diagnoses and all orders for this visit:      Routine general medical examination at a health care facility      Care plan reviewed with and given to patient.        Electronically signed by Ana Ash DO on 12/8/2021 at 10:44 AM

## 2021-12-08 ENCOUNTER — OFFICE VISIT (OUTPATIENT)
Dept: FAMILY MEDICINE CLINIC | Age: 61
End: 2021-12-08
Payer: MEDICARE

## 2021-12-08 VITALS
OXYGEN SATURATION: 95 % | HEART RATE: 88 BPM | BODY MASS INDEX: 29.69 KG/M2 | SYSTOLIC BLOOD PRESSURE: 136 MMHG | RESPIRATION RATE: 14 BRPM | TEMPERATURE: 98.5 F | DIASTOLIC BLOOD PRESSURE: 66 MMHG | HEIGHT: 65 IN | WEIGHT: 178.2 LBS

## 2021-12-08 DIAGNOSIS — R73.03 PREDIABETES: ICD-10-CM

## 2021-12-08 DIAGNOSIS — F17.210 CIGARETTE NICOTINE DEPENDENCE WITHOUT COMPLICATION: ICD-10-CM

## 2021-12-08 DIAGNOSIS — I10 ESSENTIAL HYPERTENSION: ICD-10-CM

## 2021-12-08 DIAGNOSIS — J44.9 CHRONIC OBSTRUCTIVE PULMONARY DISEASE, UNSPECIFIED COPD TYPE (HCC): ICD-10-CM

## 2021-12-08 DIAGNOSIS — M25.531 CHRONIC PAIN OF RIGHT WRIST: ICD-10-CM

## 2021-12-08 DIAGNOSIS — K21.9 GASTROESOPHAGEAL REFLUX DISEASE WITHOUT ESOPHAGITIS: ICD-10-CM

## 2021-12-08 DIAGNOSIS — F33.42 RECURRENT MAJOR DEPRESSIVE DISORDER, IN FULL REMISSION (HCC): ICD-10-CM

## 2021-12-08 DIAGNOSIS — Z00.00 ROUTINE GENERAL MEDICAL EXAMINATION AT A HEALTH CARE FACILITY: Primary | ICD-10-CM

## 2021-12-08 DIAGNOSIS — G89.29 CHRONIC PAIN OF RIGHT WRIST: ICD-10-CM

## 2021-12-08 DIAGNOSIS — G47.00 INSOMNIA, UNSPECIFIED TYPE: ICD-10-CM

## 2021-12-08 DIAGNOSIS — K86.1 CHRONIC PANCREATITIS, UNSPECIFIED PANCREATITIS TYPE (HCC): ICD-10-CM

## 2021-12-08 DIAGNOSIS — I71.40 ABDOMINAL AORTIC ANEURYSM (AAA) WITHOUT RUPTURE: ICD-10-CM

## 2021-12-08 DIAGNOSIS — E78.5 DYSLIPIDEMIA: ICD-10-CM

## 2021-12-08 DIAGNOSIS — E66.9 OBESITY (BMI 30-39.9): ICD-10-CM

## 2021-12-08 DIAGNOSIS — I69.30 HISTORY OF STROKE WITH RESIDUAL DEFICIT: ICD-10-CM

## 2021-12-08 PROCEDURE — G0439 PPPS, SUBSEQ VISIT: HCPCS | Performed by: FAMILY MEDICINE

## 2021-12-08 PROCEDURE — G8484 FLU IMMUNIZE NO ADMIN: HCPCS | Performed by: FAMILY MEDICINE

## 2021-12-08 PROCEDURE — G8427 DOCREV CUR MEDS BY ELIG CLIN: HCPCS | Performed by: FAMILY MEDICINE

## 2021-12-08 PROCEDURE — 99213 OFFICE O/P EST LOW 20 MIN: CPT | Performed by: FAMILY MEDICINE

## 2021-12-08 PROCEDURE — 4004F PT TOBACCO SCREEN RCVD TLK: CPT | Performed by: FAMILY MEDICINE

## 2021-12-08 PROCEDURE — 3017F COLORECTAL CA SCREEN DOC REV: CPT | Performed by: FAMILY MEDICINE

## 2021-12-08 PROCEDURE — 3023F SPIROM DOC REV: CPT | Performed by: FAMILY MEDICINE

## 2021-12-08 PROCEDURE — G8926 SPIRO NO PERF OR DOC: HCPCS | Performed by: FAMILY MEDICINE

## 2021-12-08 PROCEDURE — G0296 VISIT TO DETERM LDCT ELIG: HCPCS | Performed by: FAMILY MEDICINE

## 2021-12-08 PROCEDURE — G8417 CALC BMI ABV UP PARAM F/U: HCPCS | Performed by: FAMILY MEDICINE

## 2021-12-08 ASSESSMENT — PATIENT HEALTH QUESTIONNAIRE - PHQ9
SUM OF ALL RESPONSES TO PHQ9 QUESTIONS 1 & 2: 0
1. LITTLE INTEREST OR PLEASURE IN DOING THINGS: 0
2. FEELING DOWN, DEPRESSED OR HOPELESS: 0
SUM OF ALL RESPONSES TO PHQ QUESTIONS 1-9: 0

## 2021-12-08 ASSESSMENT — LIFESTYLE VARIABLES: HOW OFTEN DO YOU HAVE A DRINK CONTAINING ALCOHOL: 0

## 2021-12-08 NOTE — PATIENT INSTRUCTIONS
cancer in people who have no symptoms of lung cancer. A low-dose CT scan uses much less radiation than a normal CT scan and shows a more detailed image of the lungs than a standard X-ray. The goal of lung cancer screening is to find cancer early, before it has a chance to grow, spread, or cause problems. One large study found that smokers who were screened with low-dose CT scans were less likely to die of lung cancer than those who were screened with standard X-ray. Below is a summary of the things you need to know regarding screening for lung cancer with low-dose computed tomography (LDCT). This is a screening program that involves routine annual screening with LDCT studies of the lung. The LDCTs are done using low-dose radiation that is not thought to increase your cancer risk. If you have other serious medical conditions (other cancers, congestive heart failure) that limit your life expectancy to less than 10 years, you should not undergo lung cancer screening with LDCT. The chance is 20%-60% that the LDCT result will show abnormalities. This would require additional testing which could include repeat imaging or even invasive procedures. Most (about 95%) of \"abnormal\" LDCT results are false in the sense that no lung cancer is ultimately found. Additionally, some (about 10%) of the cancers found would not affect your life expectancy, even if undetected and untreated. If you are still smoking, the single most important thing that you can do to reduce your risk of dying of lung cancer is to quit. For this screening to be covered by Medicare and most other insurers, strict criteria must be met. If you do not meet these criteria, but still wish to undergo LDCT testing, you will be required to sign a waiver indicating your willingness to pay for the scan.

## 2021-12-09 ENCOUNTER — TELEPHONE (OUTPATIENT)
Dept: FAMILY MEDICINE CLINIC | Age: 61
End: 2021-12-09

## 2021-12-14 DIAGNOSIS — I10 ESSENTIAL HYPERTENSION: ICD-10-CM

## 2021-12-14 RX ORDER — AMLODIPINE BESYLATE 10 MG/1
TABLET ORAL
Qty: 90 TABLET | Refills: 3 | Status: SHIPPED | OUTPATIENT
Start: 2021-12-14

## 2021-12-14 NOTE — TELEPHONE ENCOUNTER
Recent Visits  Date Type Provider Dept   12/08/21 Office Visit Oswald Gu, DO [facility]   06/22/21 Office Visit Oswald Gu, DO [facility]   12/22/20 Office Visit Oswald Gu, DO [facility]   11/04/20 Office Visit Oswald Gu, DO [facility]   Showing recent visits within past 540 days with a meds authorizing provider and meeting all other requirements  Future Appointments  No visits were found meeting these conditions.   Showing future appointments within next 150 days with a meds authorizing provider and meeting all other requirements        Future Appointments   Date Time Provider Kimber Jacinto   1/14/2022  9:00 AM STR ULTRASOUND RM 2 [facility] US STR Radiolog   1/14/2022  9:40 AM STR CT IMAGING RM1  [facility] OUT EXP STR Radiolog

## 2022-01-14 ENCOUNTER — APPOINTMENT (OUTPATIENT)
Dept: CT IMAGING | Age: 62
End: 2022-01-14
Payer: MEDICARE

## 2022-01-14 ENCOUNTER — HOSPITAL ENCOUNTER (OUTPATIENT)
Dept: CT IMAGING | Age: 62
Discharge: HOME | End: 2022-01-14
Payer: MEDICARE

## 2022-01-14 ENCOUNTER — HOSPITAL ENCOUNTER (OUTPATIENT)
Dept: ULTRASOUND IMAGING | Age: 62
Discharge: HOME | End: 2022-01-14
Payer: MEDICARE

## 2022-01-14 DIAGNOSIS — I71.40 ABDOMINAL AORTIC ANEURYSM (AAA) WITHOUT RUPTURE: ICD-10-CM

## 2022-01-14 DIAGNOSIS — F17.210 CIGARETTE NICOTINE DEPENDENCE WITHOUT COMPLICATION: ICD-10-CM

## 2022-01-14 PROCEDURE — 71271 CT THORAX LUNG CANCER SCR C-: CPT

## 2022-01-14 PROCEDURE — 76775 US EXAM ABDO BACK WALL LIM: CPT

## 2022-01-17 ENCOUNTER — TELEPHONE (OUTPATIENT)
Dept: FAMILY MEDICINE CLINIC | Age: 62
End: 2022-01-17

## 2022-01-17 NOTE — TELEPHONE ENCOUNTER
----- Message from Joan Vang, DO sent at 1/17/2022  6:26 AM EST -----  Please let pt know that abdominal US shows stable small AAA, will plan to repeat this in 2 years, will call her to get done as time gets closer. Also, Please let pt know that LDCT of chest is negative for cancer. Repeat 1 year per current guidelines. Let me know if questions, thanks!

## 2022-02-10 ENCOUNTER — TELEPHONE (OUTPATIENT)
Dept: FAMILY MEDICINE CLINIC | Age: 62
End: 2022-02-10

## 2022-02-10 DIAGNOSIS — F33.42 RECURRENT MAJOR DEPRESSIVE DISORDER, IN FULL REMISSION (HCC): ICD-10-CM

## 2022-02-10 RX ORDER — ESCITALOPRAM OXALATE 10 MG/1
10 TABLET ORAL DAILY
Qty: 90 TABLET | Refills: 3 | Status: SHIPPED | OUTPATIENT
Start: 2022-02-10

## 2022-02-10 NOTE — TELEPHONE ENCOUNTER
RF sent  Seen [address] [facility] [address] 2021  Needs 6 month f/u apt scheduled for mid June 2022 for f/u chronic medical conditions. Not scheduled at time of visit for some reason    Thanks!

## 2022-03-18 ENCOUNTER — OFFICE VISIT (OUTPATIENT)
Dept: FAMILY MEDICINE CLINIC | Age: 62
End: 2022-03-18
Payer: MEDICARE

## 2022-03-18 ENCOUNTER — HOSPITAL ENCOUNTER (OUTPATIENT)
Age: 62
Discharge: HOME | End: 2022-03-18
Payer: MEDICARE

## 2022-03-18 VITALS
WEIGHT: 174 LBS | TEMPERATURE: 98.2 F | BODY MASS INDEX: 28.99 KG/M2 | HEART RATE: 79 BPM | HEIGHT: 65 IN | DIASTOLIC BLOOD PRESSURE: 64 MMHG | SYSTOLIC BLOOD PRESSURE: 122 MMHG | OXYGEN SATURATION: 96 % | RESPIRATION RATE: 14 BRPM

## 2022-03-18 DIAGNOSIS — R10.13 EPIGASTRIC PAIN: ICD-10-CM

## 2022-03-18 DIAGNOSIS — R10.13 EPIGASTRIC PAIN: Primary | ICD-10-CM

## 2022-03-18 DIAGNOSIS — F17.210 CIGARETTE NICOTINE DEPENDENCE WITHOUT COMPLICATION: ICD-10-CM

## 2022-03-18 LAB
ALBUMIN SERPL-MCNC: 4.4 G/DL (ref 3.5–5.1)
ALP BLD-CCNC: 124 U/L (ref 38–126)
ALT SERPL-CCNC: 10 U/L (ref 11–66)
AMYLASE: 36 U/L (ref 20–104)
ANION GAP SERPL CALCULATED.3IONS-SCNC: 14 MEQ/L (ref 8–16)
AST SERPL-CCNC: 14 U/L (ref 5–40)
BASOPHILS # BLD: 0.6 %
BASOPHILS ABSOLUTE: 0 THOU/MM3 (ref 0–0.1)
BILIRUB SERPL-MCNC: 0.4 MG/DL (ref 0.3–1.2)
BUN BLDV-MCNC: 12 MG/DL (ref 7–22)
CALCIUM SERPL-MCNC: 9.7 MG/DL (ref 8.5–10.5)
CHLORIDE BLD-SCNC: 106 MEQ/L (ref 98–111)
CO2: 23 MEQ/L (ref 23–33)
CREAT SERPL-MCNC: 0.8 MG/DL (ref 0.4–1.2)
EOSINOPHIL # BLD: 0.9 %
EOSINOPHILS ABSOLUTE: 0.1 THOU/MM3 (ref 0–0.4)
ERYTHROCYTE [DISTWIDTH] IN BLOOD BY AUTOMATED COUNT: 14.1 % (ref 11.5–14.5)
ERYTHROCYTE [DISTWIDTH] IN BLOOD BY AUTOMATED COUNT: 48.7 FL (ref 35–45)
GFR SERPL CREATININE-BSD FRML MDRD: 72 ML/MIN/1.73M2
GLUCOSE BLD-MCNC: 92 MG/DL (ref 70–108)
HCT VFR BLD CALC: 49.4 % (ref 37–47)
HEMOGLOBIN: 15.5 GM/DL (ref 12–16)
IMMATURE GRANS (ABS): 0.03 THOU/MM3 (ref 0–0.07)
IMMATURE GRANULOCYTES: 0.4 %
LIPASE: 25.5 U/L (ref 5.6–51.3)
LYMPHOCYTES # BLD: 34.3 %
LYMPHOCYTES ABSOLUTE: 2.4 THOU/MM3 (ref 1–4.8)
MCH RBC QN AUTO: 29.6 PG (ref 26–33)
MCHC RBC AUTO-ENTMCNC: 31.4 GM/DL (ref 32.2–35.5)
MCV RBC AUTO: 94.5 FL (ref 81–99)
MONOCYTES # BLD: 7.6 %
MONOCYTES ABSOLUTE: 0.5 THOU/MM3 (ref 0.4–1.3)
NUCLEATED RED BLOOD CELLS: 0 /100 WBC
PLATELET # BLD: 221 THOU/MM3 (ref 130–400)
PMV BLD AUTO: 11.2 FL (ref 9.4–12.4)
POTASSIUM SERPL-SCNC: 4.1 MEQ/L (ref 3.5–5.2)
RBC # BLD: 5.23 MILL/MM3 (ref 4.2–5.4)
SEG NEUTROPHILS: 56.2 %
SEGMENTED NEUTROPHILS ABSOLUTE COUNT: 3.9 THOU/MM3 (ref 1.8–7.7)
SODIUM BLD-SCNC: 143 MEQ/L (ref 135–145)
TOTAL PROTEIN: 7.3 G/DL (ref 6.1–8)
WBC # BLD: 7 THOU/MM3 (ref 4.8–10.8)

## 2022-03-18 PROCEDURE — 85025 COMPLETE CBC W/AUTO DIFF WBC: CPT

## 2022-03-18 PROCEDURE — 3017F COLORECTAL CA SCREEN DOC REV: CPT | Performed by: FAMILY MEDICINE

## 2022-03-18 PROCEDURE — G8484 FLU IMMUNIZE NO ADMIN: HCPCS | Performed by: FAMILY MEDICINE

## 2022-03-18 PROCEDURE — G8417 CALC BMI ABV UP PARAM F/U: HCPCS | Performed by: FAMILY MEDICINE

## 2022-03-18 PROCEDURE — 36415 COLL VENOUS BLD VENIPUNCTURE: CPT

## 2022-03-18 PROCEDURE — 99214 OFFICE O/P EST MOD 30 MIN: CPT | Performed by: FAMILY MEDICINE

## 2022-03-18 PROCEDURE — G8427 DOCREV CUR MEDS BY ELIG CLIN: HCPCS | Performed by: FAMILY MEDICINE

## 2022-03-18 PROCEDURE — 83690 ASSAY OF LIPASE: CPT

## 2022-03-18 PROCEDURE — 82150 ASSAY OF AMYLASE: CPT

## 2022-03-18 PROCEDURE — 4004F PT TOBACCO SCREEN RCVD TLK: CPT | Performed by: FAMILY MEDICINE

## 2022-03-18 PROCEDURE — 80053 COMPREHEN METABOLIC PANEL: CPT

## 2022-03-18 RX ORDER — SUCRALFATE 1 G/1
1 TABLET ORAL 4 TIMES DAILY
Qty: 120 TABLET | Refills: 0 | Status: SHIPPED | OUTPATIENT
Start: 2022-03-18

## 2022-03-18 NOTE — PROGRESS NOTES
Chief Complaint   Patient presents with    Abdominal Pain     abd pain  for last week  mid abd. \" feels  like a starving pain but more intense \"        History obtained from the patient. SUBJECTIVE:  Aroldo Berg is a 59 y.o. female that presents today for     -Abdominal Pain    HPI:    Started 1 wk ago  Upper abdomen  Constant pain  Nothing makes it better or worse  Denies sig NSAID use  Is on plavix. On pantoprazole once daily and pepcid bid  Pain feels like an ache and raw  Eating does make pain better or worse  No dysphagia  No melena or hematocheiza  No N/V  No coffee ground emesis  Denies ETOH use  No hx of PUD  Hx of chronic pancreatitis, but had not been causing issues at present  Prior to a wk ago felt fine. Pt rates the pain a 4/10  Stools have been runny    Location:  epigastric    Description: aching and cramping   Provoking Factors - denies  Alleviating Factors - denies  Severity - as above   Radiation: No    Change in pain with eating? No  Change in pain with BM? No  Nausea? no  Vomiting? no  Diarrhea? no  Constipation? no  Blood in Stools? no  Dysuria/Change in Urinary Frequency/Hematuria? no  Back Pain? no  LMP? Vaginal Discharge? no      -Smoking:   On 5 cig per day  Not quite ready to quit      Age/Gender Health Maintenance    Lipid -   Lab Results   Component Value Date    CHOL 137 12/06/2021    CHOL 161 11/05/2020    CHOL 172 01/09/2019     Lab Results   Component Value Date    TRIG 112 12/06/2021    TRIG 154 11/05/2020    TRIG 148 01/09/2019     Lab Results   Component Value Date    HDL 48 12/06/2021    HDL 54 11/05/2020    HDL 47 01/09/2019     Lab Results   Component Value Date    LDLCALC 67 12/06/2021    LDLCALC 76 11/05/2020    [facility] 95 01/09/2019     Lab Results   Component Value Date    VLDL 33 12/14/2011     No results found for: CHOLHDLRATIO      DM Screen - 101 fasting, (June 2018)  Lab Results   Component Value Date    GLUCOSE 110 12/06/2021    GLUCOSE 143 10/15/2019     Lab Results   Component Value Date    LABA1C 5.8 12/06/2021    LABA1C 5.6 06/15/2021    LABA1C 6.1 11/05/2020    LABA1C 6.3 01/22/2019    LABA1C 5.6 05/15/2017    LABA1C 5.8 04/10/2014       Colon Cancer Screening - + sessile T/a FEB 2019, repeat 2 years per Catia KOVACS.  Pt will call Catia's office to setup a f/u apt ([address] [facility])  Lung Cancer Screening (Age 54 to [de-identified] with 30 pack year hx, current smoker or quit within past 15 years) - NEG JAN 2022    Tetanus - to get at pharmacy per medicare rules  Influenza Vaccine - declines DEC 2021  Pneumonia Vaccine - UTD PPV 23 APR 2014  Zoster - to get at pharmacy per medicare rules    Breast Cancer Screening - NEG JAN 2021  Cervical Cancer Screening - NEG FEB 2019   Osteoporosis Screening - age 70      Specialist List  Neuro: [facility]  GI: Lin Norman: [facility]  Cardio: [facility]  [facility]: [facility]      Current Outpatient Medications   Medication Sig Dispense Refill    sucralfate (CARAFATE) 1 GM tablet Take 1 tablet by mouth 4 times daily 120 tablet 0    escitalopram (LEXAPRO) 10 MG tablet take 1 tablet by mouth daily 90 tablet 3    amLODIPine (NORVASC) 10 MG tablet take 1 tablet by mouth once daily 90 tablet 3    atorvastatin (LIPITOR) 80 MG tablet take 1 tablet by mouth once daily at bedtime 90 tablet 3    folic acid (FOLVITE) 1 MG tablet take 1 tablet by mouth once daily 90 tablet 3    clopidogrel (PLAVIX) 75 MG tablet take 1 tablet by mouth once daily 90 tablet 3    famotidine (PEPCID) 20 MG tablet take 1 tablet by mouth twice a day 180 tablet 3    traZODone (DESYREL) 50 MG tablet Take 1 tablet by mouth nightly 90 tablet 3    ondansetron (ZOFRAN-ODT) 4 MG disintegrating tablet Take by mouth      pantoprazole (PROTONIX) 40 MG tablet Take 1 tablet by mouth every morning (before breakfast) 90 tablet 3    albuterol sulfate HFA (PROAIR HFA) 108 (90 Base) MCG/ACT inhaler Inhale 2 puffs into the lungs every 6 hours as needed for Wheezing or Shortness of Breath 3 Inhaler 3    salmeterol (SEREVENT DISKUS) 50 MCG/DOSE diskus inhaler Inhale 1 puff into the lungs 2 times daily 1 each 3    acetaminophen (TYLENOL) 325 MG tablet Take 650-975 mg by mouth every 6 hours as needed for Pain       No current facility-administered medications for this visit. Orders Placed This Encounter   Medications    sucralfate (CARAFATE) 1 GM tablet     Sig: Take 1 tablet by mouth 4 times daily     Dispense:  120 tablet     Refill:  0         All medications reviewed and reconciled, including OTC and herbal medications. Updated list given to patient. Patient Active Problem List    Diagnosis Date Noted    Major depression     Prediabetes     Chronic bilateral low back pain with left-sided sciatica 06/12/2018    GERD (gastroesophageal reflux disease)     Former smoker     Osteoarthritis     Dyslipidemia 05/19/2017    History of stroke with residual deficit; cryptogenic 05/15/2017     Followed with neuro at [facility], last note states can f/u prn. Also follows with cardio at [facility]        Obesity (BMI 30-39.9) 05/08/2014    Essential hypertension 05/08/2014    Chronic pancreatitis ([address] 05/08/2014    AAA (abdominal aortic aneurysm) ([facility] [address] 04/18/2014     Follows with Dr. Taya mauricio      COPD (chronic obstructive pulmonary disease) [facility])        Past Medical History:   Diagnosis Date    AAA (abdominal aortic aneurysm) ([address] 04/18/2014    Follows with Dr. Taya mauricio    Chronic pancreatitis ([address]     COPD (chronic obstructive pulmonary disease) ([facility] [address]     Dyslipidemia     Essential hypertension 05/08/2014    Former smoker     GERD (gastroesophageal reflux disease)     History of stroke with residual deficit; cryptogenic 05/2017    Follows with neuro and cardio at [facility]    Major depression     Obesity (BMI 30-39. 9)     Osteoarthritis     Prediabetes        Past Surgical History:   Procedure Laterality Date    ABDOMEN SURGERY      c sections x3    CARPAL TUNNEL RELEASE Bilateral      SECTION      x3    COLONOSCOPY      Dr. Kj Arnold EGD  [phone]    INSERTABLE CARDIAC MONITOR      TONSILLECTOMY      as child    UPPER GASTROINTESTINAL ENDOSCOPY         Allergies   Allergen Reactions    Aspirin      Upset stomach    Flagyl [Metronidazole] Other (See Comments)     abd pain    Ibuprofen      Stomach pain    Spiriva Handihaler [Tiotropium Bromide Monohydrate] Other (See Comments)     Upper chest pain     Sulfa Antibiotics Other (See Comments)     PT WAS KID DOESNT KNOW       Social History     Tobacco Use    Smoking status: Current Every Day Smoker     Packs/day: 0.00     Years: 45.00     Pack years: 0.00     Types: Cigarettes     Last attempt to quit: 2018     Years since quitting: 3.2    Smokeless tobacco: Never Used   Substance Use Topics    Alcohol use: No     Alcohol/week: 0.0 standard drinks       Family History   Problem Relation Age of Onset    Diabetes Maternal Grandmother     Diabetes Mother     Heart Disease Mother     Cancer Father         Something in the chest, pt not sure what    Heart Disease Father     COPD Father     Diabetes Maternal Aunt     High Blood Pressure Brother     Colon Cancer Other     Arthritis Neg Hx     Asthma Neg Hx     Birth Defects Neg Hx     Depression Neg Hx     Early Death Neg Hx     Hearing Loss Neg Hx     High Cholesterol Neg Hx     Kidney Disease Neg Hx     Learning Disabilities Neg Hx     Mental Illness Neg Hx     Mental Retardation Neg Hx     Miscarriages / Stillbirths Neg Hx     Stroke Neg Hx     Substance Abuse Neg Hx     Vision Loss Neg Hx     Breast Cancer Neg Hx          I have reviewed the patient's past medical history, past surgical history, allergies, medications, social and family history and I have made updates where appropriate.       Review of Systems  Positive responses are highlighted in bold    Constitutional:  Fever, Chills, Night Sweats, Fatigue, Unexpected Liver and spleen without enlargement. Extremities: no cyanosis, clubbing or edema of the lower extremities. +2 PT/DP bilaterally. Musculoskeletal: No joint swelling or gross deformity   Skin: warm and dry. No rash or erythema. ASSESSMENT & PLAN  1. Epigastric pain    Unclear etiology  Ddx includes gastritis, PUD and pancreatic  Less likely gallbladder  VSS  Exam reassuring    Plan:  Labs below today  Inc pantoprazole to bid  con't famotidine  Add carafate  F/u Monday  Reviewed ER precautions, pt understands. - CBC with Auto Differential; Future  - Comprehensive Metabolic Panel; Future  - Amylase; Future  - Lipase; Future  - sucralfate (CARAFATE) 1 GM tablet; Take 1 tablet by mouth 4 times daily  Dispense: 120 tablet; Refill: 0    2. Cigarette nicotine dependence without complication    In precontemplation stage and not ready to quit. Declines cessation, aware of risks of continued smoking as well as resources available to help quit. These include tobacco cessation classes as well as 1-800-QUIT NOW. No barriers other than lack of desire. 3+ min spent counseling. DISPOSITION    Return in 3 days (on 3/21/2022) for f/u upper abdominal pain, sooner as needed. [facility] released with restrictions. Future Appointments   Date Time Provider Kimber Jacinto   3/21/2022 10:00 AM [address]   6/16/2022 10:40 AM Rody Tony DO [facility]     PATIENT COUNSELING    Counseling was provided today regarding the following topics: Healthy eating habits, Regular exercise, substance abuse and healthy sleep habits. Barriers to learning and self management: none    Discussed use, benefit, and side effects of prescribed medications. Barriers to medication compliance addressed. All patient questions answered. Pt voiced understanding.        Electronically signed by Rody Tony DO on 3/18/2022 at 12:45 PM

## 2022-03-18 NOTE — PATIENT INSTRUCTIONS
-Increase your pantoprazole to twice daily (1st thing in the morning and 2nd dose with your evening meal)  -Continue your famotidine twice daily

## 2022-03-20 NOTE — PROGRESS NOTES
Chief Complaint   Patient presents with    Follow-up     abd some better       History obtained from the patient. SUBJECTIVE:  Jessica Worley is a 59 y.o. female that presents today for     -Abdominal Pain LAST VISIT:    HPI:    Started 1 wk ago  Upper abdomen  Constant pain  Nothing makes it better or worse  Denies sig NSAID use  Is on plavix. On pantoprazole once daily and pepcid bid  Pain feels like an ache and raw  Eating does make pain better or worse  No dysphagia  No melena or hematocheiza  No N/V  No coffee ground emesis  Denies ETOH use  No hx of PUD  Hx of chronic pancreatitis, but had not been causing issues at present  Prior to a wk ago felt fine. Pt rates the pain a 4/10  Stools have been runny    Location:  epigastric    Description: aching and cramping   Provoking Factors - denies  Alleviating Factors - denies  Severity - as above   Radiation: No    Change in pain with eating? No  Change in pain with BM? No  Nausea? no  Vomiting? no  Diarrhea? no  Constipation? no  Blood in Stools? no  Dysuria/Change in Urinary Frequency/Hematuria? no  Back Pain? no  LMP? Vaginal Discharge? no    UPDATE TODAY:   Here for f/u from Friday  Labs completed Friday and reassuring  sxs with mild improvement  Pain is less severe  We inc pantoprazole to bid from once daily and added carafate  No dysphagia  No melena or hematocheiza  No N/V  No coffee ground emesis       -Smoking:   On 5 cig per day  Not quite ready to quit      Age/Gender Health Maintenance    Lipid -   Lab Results   Component Value Date    CHOL 137 12/06/2021    CHOL 161 11/05/2020    CHOL 172 01/09/2019     Lab Results   Component Value Date    TRIG 112 12/06/2021    TRIG 154 11/05/2020    TRIG 148 01/09/2019     Lab Results   Component Value Date    HDL 48 12/06/2021    HDL 54 11/05/2020    HDL 47 01/09/2019     Lab Results   Component Value Date    LDLCALC 67 12/06/2021    LDLCALC 76 11/05/2020    [address] 01/09/2019     Lab Results Component Value Date    VLDL 33 12/14/2011     No results found for: DANIEL      DM Screen - 101 fasting, (June 2018)  Lab Results   Component Value Date    GLUCOSE 92 03/18/2022    GLUCOSE 143 10/15/2019     Lab Results   Component Value Date    LABA1C 5.8 12/06/2021    LABA1C 5.6 06/15/2021    LABA1C 6.1 11/05/2020    LABA1C 6.3 01/22/2019    LABA1C 5.6 05/15/2017    LABA1C 5.8 04/10/2014       Colon Cancer Screening - + sessile T/a FEB 2019, repeat 2 years per Catia KOVACS.  Pt will call Catia's office to setup a f/u apt ([address] [facility])  Lung Cancer Screening (Age 54 to [de-identified] with 30 pack year hx, current smoker or quit within past 15 years) - NEG JAN 2022    Tetanus - to get at pharmacy per medicare rules  Influenza Vaccine - declines DEC 2021  Pneumonia Vaccine - UTD PPV 23 APR 2014  Zoster - to get at pharmacy per medicare rules    Breast Cancer Screening - NEG JAN 2021  Cervical Cancer Screening - NEG FEB 2019   Osteoporosis Screening - age 70      Specialist List  Neuro: Yayo  GI: Jas Rout: [facility]  Cardio: [facility]  [facility]: [facility]      Current Outpatient Medications   Medication Sig Dispense Refill    sucralfate (CARAFATE) 1 GM tablet Take 1 tablet by mouth 4 times daily 120 tablet 0    escitalopram (LEXAPRO) 10 MG tablet take 1 tablet by mouth daily 90 tablet 3    amLODIPine (NORVASC) 10 MG tablet take 1 tablet by mouth once daily 90 tablet 3    atorvastatin (LIPITOR) 80 MG tablet take 1 tablet by mouth once daily at bedtime 90 tablet 3    folic acid (FOLVITE) 1 MG tablet take 1 tablet by mouth once daily 90 tablet 3    clopidogrel (PLAVIX) 75 MG tablet take 1 tablet by mouth once daily 90 tablet 3    famotidine (PEPCID) 20 MG tablet take 1 tablet by mouth twice a day 180 tablet 3    traZODone (DESYREL) 50 MG tablet Take 1 tablet by mouth nightly 90 tablet 3    ondansetron (ZOFRAN-ODT) 4 MG disintegrating tablet Take by mouth      pantoprazole (PROTONIX) 40 MG tablet Take 1 tablet by mouth every morning (before breakfast) 90 tablet 3    albuterol sulfate HFA (PROAIR HFA) 108 (90 Base) MCG/ACT inhaler Inhale 2 puffs into the lungs every 6 hours as needed for Wheezing or Shortness of Breath 3 Inhaler 3    salmeterol (SEREVENT DISKUS) 50 MCG/DOSE diskus inhaler Inhale 1 puff into the lungs 2 times daily 1 each 3    acetaminophen (TYLENOL) 325 MG tablet Take 650-975 mg by mouth every 6 hours as needed for Pain       No current facility-administered medications for this visit. No orders of the defined types were placed in this encounter. All medications reviewed and reconciled, including OTC and herbal medications. Updated list given to patient. Patient Active Problem List    Diagnosis Date Noted    Major depression     Prediabetes     Chronic bilateral low back pain with left-sided sciatica 06/12/2018    GERD (gastroesophageal reflux disease)     Former smoker     Osteoarthritis     Dyslipidemia 05/19/2017    History of stroke with residual deficit; cryptogenic 05/15/2017     Followed with neuro at [address] [place] [address], last note states can f/u prn. Also follows with cardio at [address] [place] [address]        Obesity (BMI 30-39.9) 05/08/2014    Essential hypertension 05/08/2014    Chronic pancreatitis ([facility] 05/08/2014    AAA (abdominal aortic aneurysm) ([facility] 04/18/2014     Follows with Dr. Taya mauricio      COPD [facility]chronic obstructive pulmonary disease) [facility])        Past Medical History:   Diagnosis Date    AAA (abdominal aortic aneurysm) ([facility] 04/18/2014    Follows with Dr. Taya mauricio    Chronic pancreatitis ([facility]     COPD (chronic obstructive pulmonary disease) ([facility]     Dyslipidemia     Essential hypertension 05/08/2014    Former smoker     GERD (gastroesophageal reflux disease)     History of stroke with residual deficit; cryptogenic 05/2017    [facility] and [facility]    Major depression     Obesity (BMI 30-39. 9)     Osteoarthritis     Prediabetes        Past Surgical History: Procedure Laterality Date    ABDOMEN SURGERY      c sections x3    CARPAL TUNNEL RELEASE Bilateral      SECTION      x3    COLONOSCOPY      Dr. Lindsey Luu EGD  [id number]    INSERTABLE CARDIAC MONITOR      TONSILLECTOMY      as child    UPPER GASTROINTESTINAL ENDOSCOPY         Allergies   Allergen Reactions    Aspirin      Upset stomach    Flagyl [Metronidazole] Other (See Comments)     abd pain    Ibuprofen      Stomach pain    Spiriva Handihaler [Tiotropium Bromide Monohydrate] Other (See Comments)     Upper chest pain     Sulfa Antibiotics Other (See Comments)     PT WAS KID DOESNT KNOW       Social History     Tobacco Use    Smoking status: Current Every Day Smoker     Packs/day: 0.00     Years: 45.00     Pack years: 0.00     Types: Cigarettes     Last attempt to quit: 2018     Years since quitting: 3.2    Smokeless tobacco: Never Used   Substance Use Topics    Alcohol use: No     Alcohol/week: 0.0 standard drinks       Family History   Problem Relation Age of Onset    Diabetes Maternal Grandmother     Diabetes Mother     Heart Disease Mother     Cancer Father         Something in the chest, pt not sure what    Heart Disease Father     COPD Father     Diabetes Maternal Aunt     High Blood Pressure Brother     Colon Cancer Other     Arthritis Neg Hx     Asthma Neg Hx     Birth Defects Neg Hx     Depression Neg Hx     Early Death Neg Hx     Hearing Loss Neg Hx     High Cholesterol Neg Hx     Kidney Disease Neg Hx     Learning Disabilities Neg Hx     Mental Illness Neg Hx     Mental Retardation Neg Hx     Miscarriages / Stillbirths Neg Hx     Stroke Neg Hx     Substance Abuse Neg Hx     Vision Loss Neg Hx     Breast Cancer Neg Hx          I have reviewed the patient's past medical history, past surgical history, allergies, medications, social and family history and I have made updates where appropriate.       Review of Systems  Positive responses are highlighted in bold    Constitutional:  Fever, Chills, Night Sweats, Fatigue, Unexpected changes in weight  HENT:  Ear pain, Tinnitus, Nosebleeds, Trouble swallowing, Hearing loss, Sore throat  Cardiovascular:  Chest Pain, Palpitations, Orthopnea, Paroxysmal Nocturnal Dyspnea  Respiratory:  Cough, Wheezing, Shortness of breath, Chest tightness, Apnea  Gastrointestinal:  Nausea, Vomiting, Diarrhea, Constipation, Heartburn, Blood in stool  Genitourinary:  Difficulty or painful urination, Flank pain, Change in frequency, Urgency  Skin:  Color change, Rash, Itching, Wound  Musculoskeletal:  Joint pain, Back pain, Gait problems, Joint swelling, Myalgias  Neurological:  Dizziness, Headaches, Presyncope, Numbness, Seizures, Tremors  Endocrine:  Heat Intolerance, Cold Intolerance, Polydipsia, Polyphagia, Polyuria      PHYSICAL EXAM:  Vitals:    03/21/22 1001   BP: 132/64   Pulse: 72   Resp: 14   Temp: 98 °F (36.7 °C)   TempSrc: Oral   SpO2: 96%   Weight: 181 lb (82.1 kg)   Height: 5' 4.5\" (1.638 m)     Body mass index is 30.59 kg/m². Pain Score:   4 (Epigastric)    VS Reviewed  General Appearance: A&O x 3, No acute distress,well developed and well- nourished  Eyes: pupils equal, round, and reactive to light, extraocular eye movements intact, conjunctivae and eye lids without erythema  ENT: external ear and ear canal clear bilaterally, TMs intact and regular, nose without deformity, nasal mucosa and turbinates normal without polyps, oropharynx normal, dentition is normal for age  Neck: supple and non-tender without mass, no thyromegaly or thyroid nodules, no cervical lymphadenopathy  Pulmonary/Chest: distant but clear to auscultation bilaterally- no wheezes, rales or rhonchi, normal air movement, no respiratory distress or retractions  Cardiovascular: distant with S1 and S2 auscultated w/ RRR. No murmurs, rubs, clicks, or gallops, distal pulses intact.   Abdomen: soft, + TTP epigastric region no TTP elsewhere, non-distended, bowel sounds physiologic,  no rebound or guarding, no masses or hernias noted. Liver and spleen without enlargement. Extremities: no cyanosis, clubbing or edema of the lower extremities. +2 PT/DP bilaterally. Musculoskeletal: No joint swelling or gross deformity   Skin: warm and dry. No rash or erythema.        Hospital Outpatient Visit on 03/18/2022   Component Date Value Ref Range Status    Lipase 03/18/2022 25.5  5.6 - 51.3 U/L Final    Amylase 03/18/2022 36  20 - 104 U/L Final    Glucose 03/18/2022 92  70 - 108 mg/dL Final    CREATININE 03/18/2022 0.8  0.4 - 1.2 mg/dL Final    BUN 03/18/2022 12  7 - 22 mg/dL Final    Sodium 03/18/2022 143  135 - 145 meq/L Final    Potassium 03/18/2022 4.1  3.5 - 5.2 meq/L Final    Chloride 03/18/2022 106  98 - 111 meq/L Final    CO2 03/18/2022 23  23 - 33 meq/L Final    Calcium 03/18/2022 9.7  8.5 - 10.5 mg/dL Final    AST 03/18/2022 14  5 - 40 U/L Final    Alkaline Phosphatase 03/18/2022 124  38 - 126 U/L Final    Total Protein 03/18/2022 7.3  6.1 - 8.0 g/dL Final    Albumin 03/18/2022 4.4  3.5 - 5.1 g/dL Final    Total Bilirubin 03/18/2022 0.4  0.3 - 1.2 mg/dL Final    ALT 03/18/2022 10* 11 - 66 U/L Final    WBC 03/18/2022 7.0  4.8 - 10.8 thou/mm3 Final    RBC 03/18/2022 5.23  4.20 - 5.40 mill/mm3 Final    Hemoglobin 03/18/2022 15.5  12.0 - 16.0 gm/dl Final    Hematocrit 03/18/2022 49.4* 37.0 - 47.0 % Final    MCV 03/18/2022 94.5  81.0 - 99.0 fL Final    MCH 03/18/2022 29.6  26.0 - 33.0 pg Final    MCHC 03/18/2022 31.4* 32.2 - 35.5 gm/dl Final    RDW-CV 03/18/2022 14.1  11.5 - 14.5 % Final    RDW-SD 03/18/2022 48.7* 35.0 - 45.0 fL Final    Platelets 59/94/8502 221  130 - 400 thou/mm3 Final    MPV 03/18/2022 11.2  9.4 - 12.4 fL Final    Seg Neutrophils 03/18/2022 56.2  % Final    Lymphocytes 03/18/2022 34.3  % Final    Monocytes 03/18/2022 7.6  % Final    Eosinophils 03/18/2022 0.9  % Final    Basophils 03/18/2022 0.6  % Final    Immature Granulocytes 03/18/2022 0.4  % Final    Segs Absolute 03/18/2022 3.9  1.8 - 7.7 thou/mm3 Final    Lymphocytes Absolute 03/18/2022 2.4  1.0 - 4.8 thou/mm3 Final    Monocytes Absolute 03/18/2022 0.5  0.4 - 1.3 thou/mm3 Final    Eosinophils Absolute 03/18/2022 0.1  0.0 - 0.4 thou/mm3 Final    Basophils Absolute 03/18/2022 0.0  0.0 - 0.1 thou/mm3 Final    Immature Grans (Abs) 03/18/2022 0.03  0.00 - 0.07 thou/mm3 Final    nRBC 03/18/2022 0  /100 wbc Final    Anion Gap 03/18/2022 14.0  8.0 - 16.0 meq/L Final    Est, Glom Filt Rate 03/18/2022 72* ml/min/1.73m2 Final       ASSESSMENT & PLAN  1. Epigastric pain    Unclear etiology  Though seems most likely acid-peptic  Pancreatic less likely with normal amylase/lipase  ddx includes gastritis, PUD or gastric mass    Plan:  con't pantoprazole bid  con't carafate  con't famotine  Avoid NSAIDS, doesn't take often  Get f/u with GI soon, staff able to get her in next wk  F/u here any changes  Reviewed ER precautions, pt understands. 2. Cigarette nicotine dependence without complication    In precontemplation stage and not ready to quit. Declines cessation, aware of risks of continued smoking as well as resources available to help quit. These include tobacco cessation classes as well as 1-800-QUIT NOW. No barriers other than lack of desire. 3+ min spent counseling. DISPOSITION    Return if symptoms worsen or fail to improve. Lennox Elena released with restrictions. Future Appointments   Date Time Provider Kimber Jacinto   3/31/2022  3:30 PM MADAI Barker - CNP [facility]   6/16/2022 10:40 AM [facility] [place]     PATIENT COUNSELING    Counseling was provided today regarding the following topics: Healthy eating habits, Regular exercise, substance abuse and healthy sleep habits. Barriers to learning and self management: none    Discussed use, benefit, and side effects of prescribed medications.   Barriers to medication compliance addressed. All patient questions answered. Pt voiced understanding.        Electronically signed by Agustina Sherwood DO on 3/21/2022 at 10:33 AM

## 2022-03-21 ENCOUNTER — OFFICE VISIT (OUTPATIENT)
Dept: FAMILY MEDICINE CLINIC | Age: 62
End: 2022-03-21
Payer: MEDICARE

## 2022-03-21 VITALS
HEIGHT: 65 IN | SYSTOLIC BLOOD PRESSURE: 132 MMHG | OXYGEN SATURATION: 96 % | BODY MASS INDEX: 30.16 KG/M2 | DIASTOLIC BLOOD PRESSURE: 64 MMHG | HEART RATE: 72 BPM | RESPIRATION RATE: 14 BRPM | WEIGHT: 181 LBS | TEMPERATURE: 98 F

## 2022-03-21 DIAGNOSIS — F17.210 CIGARETTE NICOTINE DEPENDENCE WITHOUT COMPLICATION: ICD-10-CM

## 2022-03-21 DIAGNOSIS — R10.13 EPIGASTRIC PAIN: Primary | ICD-10-CM

## 2022-03-21 PROCEDURE — 99214 OFFICE O/P EST MOD 30 MIN: CPT | Performed by: FAMILY MEDICINE

## 2022-03-21 PROCEDURE — 4004F PT TOBACCO SCREEN RCVD TLK: CPT | Performed by: FAMILY MEDICINE

## 2022-03-21 PROCEDURE — G8417 CALC BMI ABV UP PARAM F/U: HCPCS | Performed by: FAMILY MEDICINE

## 2022-03-21 PROCEDURE — G8427 DOCREV CUR MEDS BY ELIG CLIN: HCPCS | Performed by: FAMILY MEDICINE

## 2022-03-21 PROCEDURE — 3017F COLORECTAL CA SCREEN DOC REV: CPT | Performed by: FAMILY MEDICINE

## 2022-03-21 PROCEDURE — G8484 FLU IMMUNIZE NO ADMIN: HCPCS | Performed by: FAMILY MEDICINE

## 2022-05-13 ENCOUNTER — TELEPHONE (OUTPATIENT)
Dept: FAMILY MEDICINE CLINIC | Age: 62
End: 2022-05-13

## 2022-05-13 DIAGNOSIS — R73.03 PREDIABETES: Primary | ICD-10-CM

## 2022-05-13 DIAGNOSIS — R73.9 HYPERGLYCEMIA: ICD-10-CM

## 2022-05-13 NOTE — TELEPHONE ENCOUNTER
Unable to reach patient.   [phone] phone just rings   [phone] customer is not available     Lab slip mailed

## 2022-05-13 NOTE — TELEPHONE ENCOUNTER
Pt due for fasting labs prior to next apt on 6/16/2022. Please call to have pt complete this. Thanks! ASSESSMENT & PLAN   Diagnosis Orders   1. Prediabetes  Glucose, Random    Hemoglobin A1C   2.  Hyperglycemia  Glucose, Random    Hemoglobin A1C     Future Appointments   Date Time Provider Kimber Jacinto   6/8/2022 10:30 AM MADAI Guo - CNP [facility]   6/16/2022 10:40 AM Nile Mejia DO [address] [facility]

## 2022-05-13 NOTE — TELEPHONE ENCOUNTER
----- Message from Sarah Espana sent at 5/13/2022 10:54 AM EDT -----  Subject: Message to Provider    QUESTIONS  Information for Provider? pt needs a call back pertaining to labs   ---------------------------------------------------------------------------  --------------  [address]  What is the best way for the office to contact you? OK to leave message on   voicemail  Preferred Call Back Phone Number?  [phone]  ---------------------------------------------------------------------------  --------------  SCRIPT ANSWERS  undefined

## 2022-05-27 DIAGNOSIS — G47.00 INSOMNIA, UNSPECIFIED TYPE: ICD-10-CM

## 2022-05-27 RX ORDER — TRAZODONE HYDROCHLORIDE 50 MG/1
50 TABLET ORAL NIGHTLY
Qty: 90 TABLET | Refills: 3 | Status: SHIPPED | OUTPATIENT
Start: 2022-05-27

## 2022-05-27 NOTE — TELEPHONE ENCOUNTER
Recent Visits  Date Type Provider Dept   03/21/22 Office Visit Felicita Desai, DO [facility]   03/18/22 Office Visit Felicita Desai, DO [facility]   12/08/21 Office Visit Felicita Desai, DO [facility]   06/22/21 Office Visit Felicita Desai, DO [facility]   12/22/20 Office Visit Felicita Desai, DO [facility]   Showing recent visits within past 540 days with a meds authorizing provider and meeting all other requirements  Future Appointments  Date Type Provider Dept   06/16/22 Appointment Felicita Desai, DO [facility]   Showing future appointments within next 150 days with a meds authorizing provider and meeting all other requirements    Future Appointments   Date Time Provider Kimber Jacinto   6/8/2022 10:30 AM MADAI Costa - CNP [facility]   6/16/2022 10:40 AM Felicita Desai, [address] [place] [address]

## 2022-06-06 ENCOUNTER — HOSPITAL ENCOUNTER (OUTPATIENT)
Age: 62
Discharge: HOME | End: 2022-06-06
Payer: MEDICARE

## 2022-06-06 ENCOUNTER — TELEPHONE (OUTPATIENT)
Dept: FAMILY MEDICINE CLINIC | Age: 62
End: 2022-06-06

## 2022-06-06 DIAGNOSIS — Z87.19 HISTORY OF CHRONIC PANCREATITIS: ICD-10-CM

## 2022-06-06 DIAGNOSIS — R73.03 PREDIABETES: ICD-10-CM

## 2022-06-06 DIAGNOSIS — R11.0 NAUSEA: ICD-10-CM

## 2022-06-06 DIAGNOSIS — R73.9 HYPERGLYCEMIA: ICD-10-CM

## 2022-06-06 DIAGNOSIS — Z80.0 FAMILY HISTORY OF PANCREATIC CANCER: ICD-10-CM

## 2022-06-06 DIAGNOSIS — E78.2 MIXED HYPERLIPIDEMIA: ICD-10-CM

## 2022-06-06 DIAGNOSIS — R10.13 EPIGASTRIC PAIN: ICD-10-CM

## 2022-06-06 LAB
ALBUMIN SERPL-MCNC: 4.5 G/DL (ref 3.5–5.1)
ALP BLD-CCNC: 125 U/L (ref 38–126)
ALT SERPL-CCNC: 17 U/L (ref 11–66)
ANION GAP SERPL CALCULATED.3IONS-SCNC: 12 MEQ/L (ref 8–16)
AST SERPL-CCNC: 18 U/L (ref 5–40)
AVERAGE GLUCOSE: 114 MG/DL (ref 70–126)
BILIRUB SERPL-MCNC: 0.5 MG/DL (ref 0.3–1.2)
BUN BLDV-MCNC: 15 MG/DL (ref 7–22)
CALCIUM SERPL-MCNC: 9.4 MG/DL (ref 8.5–10.5)
CHLORIDE BLD-SCNC: 106 MEQ/L (ref 98–111)
CHOLESTEROL, TOTAL: 149 MG/DL (ref 100–199)
CO2: 23 MEQ/L (ref 23–33)
CREAT SERPL-MCNC: 1 MG/DL (ref 0.4–1.2)
GAMMA GLUTAMYL TRANSFERASE: 14 U/L (ref 8–69)
GFR SERPL CREATININE-BSD FRML MDRD: 56 ML/MIN/1.73M2
GLUCOSE BLD-MCNC: 101 MG/DL (ref 70–108)
HBA1C MFR BLD: 5.8 % (ref 4.4–6.4)
HDLC SERPL-MCNC: 46 MG/DL
LDL CHOLESTEROL CALCULATED: 74 MG/DL
LIPASE: 29.4 U/L (ref 5.6–51.3)
POTASSIUM SERPL-SCNC: 4.3 MEQ/L (ref 3.5–5.2)
SODIUM BLD-SCNC: 141 MEQ/L (ref 135–145)
TOTAL PROTEIN: 6.9 G/DL (ref 6.1–8)
TRIGL SERPL-MCNC: 143 MG/DL (ref 0–199)

## 2022-06-06 PROCEDURE — 80061 LIPID PANEL: CPT

## 2022-06-06 PROCEDURE — 83690 ASSAY OF LIPASE: CPT

## 2022-06-06 PROCEDURE — 83036 HEMOGLOBIN GLYCOSYLATED A1C: CPT

## 2022-06-06 PROCEDURE — 82977 ASSAY OF GGT: CPT

## 2022-06-06 PROCEDURE — 36415 COLL VENOUS BLD VENIPUNCTURE: CPT

## 2022-06-06 PROCEDURE — 80053 COMPREHEN METABOLIC PANEL: CPT

## 2022-06-06 NOTE — TELEPHONE ENCOUNTER
----- Message from MADAI Boyd CNP sent at 6/6/2022  1:39 PM EDT -----  Let Artem Warner know her A1C is 5.8 which is stable.

## 2022-06-15 NOTE — PROGRESS NOTES
Chief Complaint   Patient presents with    Follow-up     Issues as noted below       History obtained from the patient. SUBJECTIVE:  Rolan Grover is a 59 y.o. female that presents today for     -Abdominal Pain PRIOR VISIT:    HPI:    Started 1 wk ago  Upper abdomen  Constant pain  Nothing makes it better or worse  Denies sig NSAID use  Is on plavix. On pantoprazole once daily and pepcid bid  Pain feels like an ache and raw  Eating does make pain better or worse  No dysphagia  No melena or hematocheiza  No N/V  No coffee ground emesis  Denies ETOH use  No hx of PUD  Hx of chronic pancreatitis, but had not been causing issues at present  Prior to a wk ago felt fine. Pt rates the pain a 4/10  Stools have been runny    Location:  epigastric    Description: aching and cramping   Provoking Factors - denies  Alleviating Factors - denies  Severity - as above   Radiation: No    Change in pain with eating? No  Change in pain with BM? No  Nausea? no  Vomiting? no  Diarrhea? no  Constipation? no  Blood in Stools? no  Dysuria/Change in Urinary Frequency/Hematuria? no  Back Pain? no  LMP? Vaginal Discharge? no    UPDATE LAST VISIT:   Here for f/u from Friday  Labs completed Friday and reassuring  sxs with mild improvement  Pain is less severe  We inc pantoprazole to bid from once daily and added carafate  No dysphagia  No melena or hematocheiza  No N/V  No coffee ground emesis     UPDATE TODAY:   con't to have epigastric pain  Seeing [facility]  Recent EGD neg  Has CT abd/pelvis scheduled and colo up coming  No dysphagia  No melena or hematocheiza  No N/V  No coffee ground emesis       -Depression PRIOR VISIT:  Inc depression  On wellbutrin  Not helping  Not sleeping well  +ahedonia  Inc feelings of guilt.    No SI/HI     UPDATE PRIOR VISIT:   Moods much better  lexapro working well  Still having trouble falling and staying asleep  No SI/HI      UPDATE TODAY:   Moods stable  lexapro working well and trazodone  No SI/HI       -COPD:     HPI:  Breathing stable  No cough, wheezing or SOB  No change in meds  Declines PFTs at this time     Current medication regimen - see below  Compliant with medications? yes     Limitations in function - none  Does patient smoke?  occasional     Chronic cough?: no  Chest pain/Tightness?:  no  Shortness of breath?: no  Wheezing? no     Last PFTs - declines today  # Exacerbations in the last year: 0     Known triggers? Yes  Hospitalized and/or intubated in the past?: No  Number of times prescribed oral steroids in the past year - 0  Influenza vaccine up to date?  declines  Pneumococcal vaccine up to date? Yes      -Hx of CVA:  On plavix and statin  No recurrent sxs  Followed with neuro  Told can now f/u prn        -HTN:     HPI:     Taking meds as prescribed ?: yes  Tolerating well ?: yes  Side Effects ?: denies  BP at home ?: <140/90  Working on TLCS ?: yes  Chest Pain/SOB/Palpitations? denies    BP Readings from Last 3 Encounters:   22 126/68   22 134/74   22 (!) 142/72       -HLD:     HPI:     Taking meds as prescribed ?: yes  Tolerating well ?: denies  Side Effects ?: denies  Muscle Pain?: denies  Working on TLCS ?: yes        -AAA: stable, was following [facility], but last saw in  and doesn't want to f/u with them. I ordered f/u US and was stable 2022 with plans to repeat in 2 years. Denies lower abd pain       -Chronic pancreatitis: used to follow with Catia KOVACS. Not seen in a while. No diarrhea. Is being w/u for new epigastric abd pain by Catia KOVACS      -Obesity: working on wt loss, wts stable. Diet ok.  Not exercising        -PreDM:  Stable  Labs reviewed today  Working on lifestyle changes      -Smokin pack q 4 days  Wants to quit  Wants to use generic chantix      Age/Gender Health Maintenance    Lipid -   Lab Results   Component Value Date    CHOL 149 2022    CHOL 137 2021    CHOL 161 2020     Lab Results   Component Value Date TRIG 143 06/06/2022    TRIG 112 12/06/2021    TRIG 154 11/05/2020     Lab Results   Component Value Date    HDL 46 06/06/2022    HDL 48 12/06/2021    HDL 54 11/05/2020     Lab Results   Component Value Date    LDLCALC 74 06/06/2022    LDLCALC 67 12/06/2021    LDLCALC 76 11/05/2020     Lab Results   Component Value Date    VLDL 33 12/14/2011     No results found for: CHOLHDLRATIO      DM Screen - 101 fasting, (June 2018)  Lab Results   Component Value Date    GLUCOSE 101 06/06/2022    GLUCOSE 143 10/15/2019     Lab Results   Component Value Date    LABA1C 5.8 06/06/2022    LABA1C 5.8 12/06/2021    LABA1C 5.6 06/15/2021    LABA1C 6.1 11/05/2020    LABA1C 6.3 01/22/2019    LABA1C 5.6 05/15/2017       Colon Cancer Screening - + sessile T/a FEB 2019, repeat 2 years per Catia KOVACS.  Is being setup soon (June 2022)  Lung Cancer Screening (Age 54 to [de-identified] with 30 pack year hx, current smoker or quit within past 15 years) - NEG JAN 2022    Tetanus - to get at pharmacy per medicare rules  Influenza Vaccine - declines DEC 2021  Pneumonia Vaccine - UTD PPV 23 APR 2014  Zoster - to get at pharmacy per medicare rules    Breast Cancer Screening - NEG JAN 2021, due, ordered June 2022  Cervical Cancer Screening - NEG FEB 2019   Osteoporosis Screening - age 70      Specialist List  Neuro: Vijaya  GI: Jenifer Balbuenaitri: [facility]  Cardio: [facility]  [facility]: [facility]      Current Outpatient Medications   Medication Sig Dispense Refill    varenicline (CHANTIX) 0.5 MG tablet 0.5mg DAILY for 3 days followed by 0.5mg TWICE DAILY for 4 days followed by 1mg TWICE DAILY, 30 day starter pack 57 tablet 0    varenicline (CHANTIX) 1 MG tablet Take 1 tablet by mouth 2 times daily 60 tablet 1    traZODone (DESYREL) 50 MG tablet take 1 tablet by mouth nightly 90 tablet 3    sucralfate (CARAFATE) 1 GM tablet Take 1 tablet by mouth 4 times daily 120 tablet 0    escitalopram (LEXAPRO) 10 MG tablet take 1 tablet by mouth daily 90 tablet 3    amLODIPine (NORVASC) 10 MG tablet take 1 tablet by mouth once daily 90 tablet 3    atorvastatin (LIPITOR) 80 MG tablet take 1 tablet by mouth once daily at bedtime 90 tablet 3    folic acid (FOLVITE) 1 MG tablet take 1 tablet by mouth once daily 90 tablet 3    clopidogrel (PLAVIX) 75 MG tablet take 1 tablet by mouth once daily 90 tablet 3    famotidine (PEPCID) 20 MG tablet take 1 tablet by mouth twice a day 180 tablet 3    ondansetron (ZOFRAN-ODT) 4 MG disintegrating tablet Take by mouth      pantoprazole (PROTONIX) 40 MG tablet Take 1 tablet by mouth every morning (before breakfast) 90 tablet 3    albuterol sulfate HFA (PROAIR HFA) 108 (90 Base) MCG/ACT inhaler Inhale 2 puffs into the lungs every 6 hours as needed for Wheezing or Shortness of Breath 3 Inhaler 3    salmeterol (SEREVENT DISKUS) 50 MCG/DOSE diskus inhaler Inhale 1 puff into the lungs 2 times daily 1 each 3    acetaminophen (TYLENOL) 325 MG tablet Take 650-975 mg by mouth every 6 hours as needed for Pain       No current facility-administered medications for this visit. Orders Placed This Encounter   Medications    varenicline (CHANTIX) 0.5 MG tablet     Si.5mg DAILY for 3 days followed by 0.5mg TWICE DAILY for 4 days followed by 1mg TWICE DAILY, 30 day starter pack     Dispense:  57 tablet     Refill:  0    varenicline (CHANTIX) 1 MG tablet     Sig: Take 1 tablet by mouth 2 times daily     Dispense:  60 tablet     Refill:  1         All medications reviewed and reconciled, including OTC and herbal medications. Updated list given to patient. Patient Active Problem List    Diagnosis Date Noted    Major depression     Prediabetes     Chronic bilateral low back pain with left-sided sciatica 2018    GERD (gastroesophageal reflux disease)     Former smoker     Osteoarthritis     Dyslipidemia 2017    History of stroke with residual deficit; cryptogenic 05/15/2017     Followed with neuro at [address] [place] [address], last note states can f/u prn.  Also follows with cardio at [address] [place] [address]        Obesity (BMI 30-39.9) 2014    Essential hypertension 2014    Chronic pancreatitis ([facility] 2014    AAA (abdominal aortic aneurysm) ([facility] 2014    COPD (chronic obstructive pulmonary disease) (HCC)        Past Medical History:   Diagnosis Date    AAA (abdominal aortic aneurysm) ([facility] 2014    Follows with Dr. Alina mauricio    Chronic pancreatitis ([facility]     COPD (chronic obstructive pulmonary disease) ([facility]     Dyslipidemia     Essential hypertension 2014    Former smoker     GERD (gastroesophageal reflux disease)     High cholesterol     History of colon polyps     History of stroke with residual deficit; cryptogenic 2017    Follows with neuro and cardio at [address] [place] [address]    Major depression     Obesity (BMI 30-39. 9)     Osteoarthritis     Prediabetes        Past Surgical History:   Procedure Laterality Date    ABDOMEN SURGERY      c sections x3    CARPAL TUNNEL RELEASE Bilateral      SECTION      x3    COLONOSCOPY      Dr. Delilah Montiel EGD  [phone]    INSERTABLE CARDIAC MONITOR      TONSILLECTOMY      as child    UPPER GASTROINTESTINAL ENDOSCOPY  2014       Allergies   Allergen Reactions    Aspirin      Upset stomach    Flagyl [Metronidazole] Other (See Comments)     abd pain    Ibuprofen      Stomach pain    Spiriva Handihaler [Tiotropium Bromide Monohydrate] Other (See Comments)     Upper chest pain     Sulfa Antibiotics Other (See Comments)     PT WAS KID DOESNT KNOW       Social History     Tobacco Use    Smoking status: Current Every Day Smoker     Packs/day: 0.33     Years: 45.00     Pack years: 14.85     Types: Cigarettes     Last attempt to quit: 2018     Years since quitting: 3.5    Smokeless tobacco: Never Used   Substance Use Topics    Alcohol use: No     Alcohol/week: 0.0 standard drinks       Family History   Problem Relation Age of Onset    Diabetes Maternal Grandmother     Diabetes Mother     Heart Disease Mother     Cancer Father         Something in the chest, pt not sure what    Heart Disease Father     COPD Father     Diabetes Maternal Aunt     High Blood Pressure Brother     Arthritis Neg Hx     Asthma Neg Hx     Birth Defects Neg Hx     Depression Neg Hx     Early Death Neg Hx     Hearing Loss Neg Hx     High Cholesterol Neg Hx     Kidney Disease Neg Hx     Learning Disabilities Neg Hx     Mental Illness Neg Hx     Mental Retardation Neg Hx     Miscarriages / Stillbirths Neg Hx     Stroke Neg Hx     Substance Abuse Neg Hx     Vision Loss Neg Hx     Breast Cancer Neg Hx     Colon Cancer Neg Hx          I have reviewed the patient's past medical history, past surgical history, allergies, medications, social and family history and I have made updates where appropriate. Review of Systems  Positive responses are highlighted in bold    Constitutional:  Fever, Chills, Night Sweats, Fatigue, Unexpected changes in weight  HENT:  Ear pain, Tinnitus, Nosebleeds, Trouble swallowing, Hearing loss, Sore throat  Cardiovascular:  Chest Pain, Palpitations, Orthopnea, Paroxysmal Nocturnal Dyspnea  Respiratory:  Cough, Wheezing, Shortness of breath, Chest tightness, Apnea  Gastrointestinal:  Nausea, Vomiting, Diarrhea, Constipation, Heartburn, Blood in stool  Genitourinary:  Difficulty or painful urination, Flank pain, Change in frequency, Urgency  Skin:  Color change, Rash, Itching, Wound  Musculoskeletal:  Joint pain, Back pain, Gait problems, Joint swelling, Myalgias  Neurological:  Dizziness, Headaches, Presyncope, Numbness, Seizures, Tremors  Endocrine:  Heat Intolerance, Cold Intolerance, Polydipsia, Polyphagia, Polyuria      PHYSICAL EXAM:  Vitals:    06/16/22 1050   BP: 126/68   Pulse: 70   Resp: 12   Temp: 98 °F (36.7 °C)   TempSrc: Oral   SpO2: 98%   Weight: 178 lb (80.7 kg)   Height: 5' 5\" (1.651 m)     Body mass index is 29.62 kg/m².        VS Reviewed  General Appearance: A&O x 3, No acute distress,well developed and well- nourished  Eyes: pupils equal, round, and reactive to light, extraocular eye movements intact, conjunctivae and eye lids without erythema  ENT: external ear and ear canal clear bilaterally, TMs intact and regular, nose without deformity, nasal mucosa and turbinates normal without polyps, oropharynx normal, dentition is normal for age  Neck: supple and non-tender without mass, no thyromegaly or thyroid nodules, no cervical lymphadenopathy  Pulmonary/Chest: distant but clear to auscultation bilaterally- no wheezes, rales or rhonchi, normal air movement, no respiratory distress or retractions  Cardiovascular: distant with S1 and S2 auscultated w/ RRR. No murmurs, rubs, clicks, or gallops, distal pulses intact. Abdomen: soft, +TTP epigastric pain, non-distended, bowel sounds physiologic,  no rebound or guarding, no masses or hernias noted. Liver and spleen without enlargement. Extremities: no cyanosis, clubbing or edema of the lower extremities. +2 PT/DP bilaterally. Musculoskeletal: No joint swelling or gross deformity   Skin: warm and dry. No rash or erythema. ASSESSMENT & PLAN  1. Epigastric pain    Unclear etiology  W/u on going  Labs/EGD reassuring  Await CT ordered by GI, Catia, and colo  con't PPI and carafate    2. Gastroesophageal reflux disease without esophagitis    As per # 1    3. Recurrent major depressive disorder, in full remission ([address]    Stable  con't lexapro at present dose  con't trazodone for insomnia, working well    4. Insomnia, unspecified type    As above    5. Chronic obstructive pulmonary disease, unspecified COPD type ([address]    Stable  con't meds, prn alb and serevent  Declines PFT order today  Discuss again next visit    6. History of stroke with residual deficit    con't tertiary prevention  Neuro released pt to f/u prn    7. Essential hypertension    At goal  con't [facility]  Labs UTD    8. Dyslipidemia    Stable  con't lipitor    9. Abdominal aortic aneurysm (AAA) without rupture ([address]    Stable  con't RF  Repeat US JAN 2024  If stable, con't to monitor  If changing, get set back up with vascular    10. Chronic pancreatitis, unspecified pancreatitis type ([address]    Stable from physio stand point  Await CT ordered by GI    11. Obesity (BMI 30-39. 9)    Discussed wt loss stragegies    12. Prediabetes    Stable  con't lifestyle changes  Labs stable    13. Cigarette nicotine dependence without complication    Trial generic chantix    - varenicline (CHANTIX) 0.5 MG tablet; 0.5mg DAILY for 3 days followed by 0.5mg TWICE DAILY for 4 days followed by 1mg TWICE DAILY, 30 day starter pack  Dispense: 57 tablet; Refill: 0  - varenicline (CHANTIX) 1 MG tablet; Take 1 tablet by mouth 2 times daily  Dispense: 60 tablet; Refill: 1    14. Encounter for screening mammogram for malignant neoplasm of breast    - [facility] JAKE DIGITAL SCREEN BILATERAL; Future      DISPOSITION    Return in about 6 months (around 12/16/2022) for AWV, follow-up on chronic medical conditions, sooner as needed. Destini Plan released with restrictions. Future Appointments   Date Time Provider Kimber Jacinto   6/25/2022  8:40 AM STR CT IMAGING RM1  [facility] OUT EXP STR [facility]   7/11/2022  9:00 AM Anderson Babinski, MD [facility]   12/19/2022  9:40 AM [facility] [place]     PATIENT COUNSELING    Counseling was provided today regarding the following topics: Healthy eating habits, Regular exercise, substance abuse and healthy sleep habits. Barriers to learning and self management: none    Discussed use, benefit, and side effects of prescribed medications. Barriers to medication compliance addressed. All patient questions answered. Pt voiced understanding.        Electronically signed by Lady Juan DO on 6/16/2022 at 11:09 AM

## 2022-06-16 ENCOUNTER — OFFICE VISIT (OUTPATIENT)
Dept: FAMILY MEDICINE CLINIC | Age: 62
End: 2022-06-16
Payer: MEDICARE

## 2022-06-16 VITALS
HEIGHT: 65 IN | HEART RATE: 70 BPM | RESPIRATION RATE: 12 BRPM | DIASTOLIC BLOOD PRESSURE: 68 MMHG | WEIGHT: 178 LBS | OXYGEN SATURATION: 98 % | TEMPERATURE: 98 F | SYSTOLIC BLOOD PRESSURE: 126 MMHG | BODY MASS INDEX: 29.66 KG/M2

## 2022-06-16 DIAGNOSIS — Z12.31 ENCOUNTER FOR SCREENING MAMMOGRAM FOR MALIGNANT NEOPLASM OF BREAST: ICD-10-CM

## 2022-06-16 DIAGNOSIS — R10.13 EPIGASTRIC PAIN: Primary | ICD-10-CM

## 2022-06-16 DIAGNOSIS — J44.9 CHRONIC OBSTRUCTIVE PULMONARY DISEASE, UNSPECIFIED COPD TYPE (HCC): ICD-10-CM

## 2022-06-16 DIAGNOSIS — E66.9 OBESITY (BMI 30-39.9): ICD-10-CM

## 2022-06-16 DIAGNOSIS — F33.42 RECURRENT MAJOR DEPRESSIVE DISORDER, IN FULL REMISSION (HCC): ICD-10-CM

## 2022-06-16 DIAGNOSIS — I10 ESSENTIAL HYPERTENSION: ICD-10-CM

## 2022-06-16 DIAGNOSIS — E78.5 DYSLIPIDEMIA: ICD-10-CM

## 2022-06-16 DIAGNOSIS — I69.30 HISTORY OF STROKE WITH RESIDUAL DEFICIT: ICD-10-CM

## 2022-06-16 DIAGNOSIS — K86.1 CHRONIC PANCREATITIS, UNSPECIFIED PANCREATITIS TYPE (HCC): ICD-10-CM

## 2022-06-16 DIAGNOSIS — I71.40 ABDOMINAL AORTIC ANEURYSM (AAA) WITHOUT RUPTURE: ICD-10-CM

## 2022-06-16 DIAGNOSIS — G47.00 INSOMNIA, UNSPECIFIED TYPE: ICD-10-CM

## 2022-06-16 DIAGNOSIS — R73.03 PREDIABETES: ICD-10-CM

## 2022-06-16 DIAGNOSIS — F17.210 CIGARETTE NICOTINE DEPENDENCE WITHOUT COMPLICATION: ICD-10-CM

## 2022-06-16 DIAGNOSIS — K21.9 GASTROESOPHAGEAL REFLUX DISEASE WITHOUT ESOPHAGITIS: ICD-10-CM

## 2022-06-16 PROCEDURE — G8427 DOCREV CUR MEDS BY ELIG CLIN: HCPCS | Performed by: FAMILY MEDICINE

## 2022-06-16 PROCEDURE — 3017F COLORECTAL CA SCREEN DOC REV: CPT | Performed by: FAMILY MEDICINE

## 2022-06-16 PROCEDURE — 3023F SPIROM DOC REV: CPT | Performed by: FAMILY MEDICINE

## 2022-06-16 PROCEDURE — G8417 CALC BMI ABV UP PARAM F/U: HCPCS | Performed by: FAMILY MEDICINE

## 2022-06-16 PROCEDURE — 99214 OFFICE O/P EST MOD 30 MIN: CPT | Performed by: FAMILY MEDICINE

## 2022-06-16 PROCEDURE — 4004F PT TOBACCO SCREEN RCVD TLK: CPT | Performed by: FAMILY MEDICINE

## 2022-06-16 RX ORDER — VARENICLINE TARTRATE 25 MG
KIT ORAL
Qty: 1 BOX | Refills: 0 | Status: CANCELLED | OUTPATIENT
Start: 2022-06-16

## 2022-06-16 RX ORDER — VARENICLINE TARTRATE 1 MG/1
1 TABLET, FILM COATED ORAL 2 TIMES DAILY
Qty: 60 TABLET | Refills: 1 | Status: SHIPPED | OUTPATIENT
Start: 2022-06-16

## 2022-06-16 RX ORDER — VARENICLINE TARTRATE 1 MG/1
1 TABLET, FILM COATED ORAL 2 TIMES DAILY
Qty: 60 TABLET | Refills: 1 | Status: CANCELLED | OUTPATIENT
Start: 2022-06-16

## 2022-06-16 RX ORDER — VARENICLINE TARTRATE 0.5 MG/1
TABLET, FILM COATED ORAL
Qty: 57 TABLET | Refills: 0 | Status: SHIPPED | OUTPATIENT
Start: 2022-06-16

## 2022-06-16 ASSESSMENT — PATIENT HEALTH QUESTIONNAIRE - PHQ9
SUM OF ALL RESPONSES TO PHQ QUESTIONS 1-9: 0
1. LITTLE INTEREST OR PLEASURE IN DOING THINGS: 0
SUM OF ALL RESPONSES TO PHQ9 QUESTIONS 1 & 2: 0
SUM OF ALL RESPONSES TO PHQ QUESTIONS 1-9: 0
2. FEELING DOWN, DEPRESSED OR HOPELESS: 0

## 2022-06-25 ENCOUNTER — HOSPITAL ENCOUNTER (OUTPATIENT)
Dept: CT IMAGING | Age: 62
Discharge: HOME | End: 2022-06-25
Payer: MEDICARE

## 2022-06-25 DIAGNOSIS — Z80.0 FAMILY HISTORY OF PANCREATIC CANCER: ICD-10-CM

## 2022-06-25 DIAGNOSIS — Z87.19 HISTORY OF CHRONIC PANCREATITIS: ICD-10-CM

## 2022-06-25 DIAGNOSIS — R10.13 EPIGASTRIC PAIN: ICD-10-CM

## 2022-06-25 DIAGNOSIS — R11.0 NAUSEA: ICD-10-CM

## 2022-06-25 DIAGNOSIS — Z98.890 S/P ENDOSCOPY: ICD-10-CM

## 2022-06-25 DIAGNOSIS — E78.2 MIXED HYPERLIPIDEMIA: ICD-10-CM

## 2022-06-25 PROCEDURE — 6360000004 HC RX CONTRAST MEDICATION: Performed by: NURSE PRACTITIONER

## 2022-06-25 PROCEDURE — 74170 CT ABD WO CNTRST FLWD CNTRST: CPT

## 2022-06-25 RX ADMIN — IOPAMIDOL 80 ML: 755 INJECTION, SOLUTION INTRAVENOUS at 08:15

## 2022-07-19 DIAGNOSIS — K21.9 GASTROESOPHAGEAL REFLUX DISEASE WITHOUT ESOPHAGITIS: Chronic | ICD-10-CM

## 2022-07-20 RX ORDER — FAMOTIDINE 20 MG/1
TABLET, FILM COATED ORAL
Qty: 180 TABLET | Refills: 3 | Status: SHIPPED | OUTPATIENT
Start: 2022-07-20

## 2022-08-03 ENCOUNTER — HOSPITAL ENCOUNTER (EMERGENCY)
Age: 62
Discharge: HOME | End: 2022-08-03
Payer: MEDICARE

## 2022-08-03 VITALS
OXYGEN SATURATION: 94 % | DIASTOLIC BLOOD PRESSURE: 72 MMHG | SYSTOLIC BLOOD PRESSURE: 147 MMHG | TEMPERATURE: 97.3 F | WEIGHT: 180 LBS | BODY MASS INDEX: 29.99 KG/M2 | HEART RATE: 83 BPM | RESPIRATION RATE: 16 BRPM | HEIGHT: 65 IN

## 2022-08-03 DIAGNOSIS — J44.1 COPD EXACERBATION (HCC): Primary | ICD-10-CM

## 2022-08-03 PROCEDURE — 99213 OFFICE O/P EST LOW 20 MIN: CPT

## 2022-08-03 PROCEDURE — 99213 OFFICE O/P EST LOW 20 MIN: CPT | Performed by: NURSE PRACTITIONER

## 2022-08-03 RX ORDER — PREDNISONE 20 MG/1
TABLET ORAL
Qty: 15 TABLET | Refills: 0 | Status: SHIPPED | OUTPATIENT
Start: 2022-08-03 | End: 2022-08-13

## 2022-08-03 RX ORDER — ALBUTEROL SULFATE 90 UG/1
2 AEROSOL, METERED RESPIRATORY (INHALATION) 4 TIMES DAILY PRN
Qty: 54 G | Refills: 0 | Status: SHIPPED | OUTPATIENT
Start: 2022-08-03

## 2022-08-03 ASSESSMENT — ENCOUNTER SYMPTOMS
SHORTNESS OF BREATH: 1
COUGH: 1
WHEEZING: 1
CHEST TIGHTNESS: 1
SORE THROAT: 0

## 2022-08-03 ASSESSMENT — PAIN - FUNCTIONAL ASSESSMENT: PAIN_FUNCTIONAL_ASSESSMENT: NONE - DENIES PAIN

## 2022-08-03 NOTE — ED PROVIDER NOTES
[facility]  Urgent Care Encounter       CHIEF COMPLAINT       Chief Complaint   Patient presents with    Shortness of Breath       Nurses Notes reviewed and I agree except as noted in the HPI. HISTORY OF PRESENT ILLNESS   Leta Ingram is a 59 y.o. female who presents plaints of shortness of breath, cough, and chest tightness. This is a recurrent problem that started 3 days ago. Patient admits to a smoking history. She denies any fever or chills. She has not tried anything for treatment. She is out of her inhaler. Admits to previously trying to quit smoking. The history is provided by the patient. REVIEW OF SYSTEMS     Review of Systems   Constitutional:  Negative for chills and fever. HENT:  Negative for congestion and sore throat. Respiratory:  Positive for cough, chest tightness, shortness of breath and wheezing. Cardiovascular:  Negative for chest pain. Musculoskeletal:  Negative for myalgias. Neurological:  Negative for dizziness, weakness and headaches. PAST MEDICAL HISTORY         Diagnosis Date    AAA [facility]ab[facility]) ([facility] 2014    Follows with Dr. Reginal[facility]    Chronic pancreatitis [facility])     COPD (chronic obstructive pulmonary disease) ([facility]     Dyslipidemia     Essential hypertension 2014    Former smoker     GERD (gastroesophageal reflux disease)     High cholesterol     History of colon polyps     History of stroke with residual deficit; cryptogenic 2017    Follows with neuro and cardio at [facility]    Major depression     Obesity (BMI 30-39. 9)     Osteoarthritis     Prediabetes        SURGICALHISTORY     Patient  has a past surgical history that includes  section; Carpal tunnel release (Bilateral); Abdomen surgery; Tonsillectomy; Upper gastrointestinal endoscopy (); Colonoscopy (); Insertable Cardiac Monitor; and EGD ([phone]).     CURRENT MEDICATIONS       Previous Medications ACETAMINOPHEN (TYLENOL) 325 MG TABLET    Take 650-975 mg by mouth every 6 hours as needed for Pain    AMLODIPINE (NORVASC) 10 MG TABLET    take 1 tablet by mouth once daily    ATORVASTATIN (LIPITOR) 80 MG TABLET    take 1 tablet by mouth once daily at bedtime    CLOPIDOGREL (PLAVIX) 75 MG TABLET    take 1 tablet by mouth once daily    ESCITALOPRAM (LEXAPRO) 10 MG TABLET    take 1 tablet by mouth daily    FAMOTIDINE (PEPCID) 20 MG TABLET    take 1 tablet by mouth twice a day    FOLIC ACID (FOLVITE) 1 MG TABLET    take 1 tablet by mouth once daily    ONDANSETRON (ZOFRAN-ODT) 4 MG DISINTEGRATING TABLET    Take by mouth    PANTOPRAZOLE (PROTONIX) 40 MG TABLET    Take 1 tablet by mouth every morning (before breakfast)    SALMETEROL (SEREVENT DISKUS) 50 MCG/DOSE DISKUS INHALER    Inhale 1 puff into the lungs 2 times daily    SUCRALFATE (CARAFATE) 1 GM TABLET    Take 1 tablet by mouth 4 times daily    TRAZODONE (DESYREL) 50 MG TABLET    take 1 tablet by mouth nightly    VARENICLINE (CHANTIX) 0.5 MG TABLET    0.5mg DAILY for 3 days followed by 0.5mg TWICE DAILY for 4 days followed by 1mg TWICE DAILY, 30 day starter pack    VARENICLINE (CHANTIX) 1 MG TABLET    Take 1 tablet by mouth 2 times daily       ALLERGIES     Patient is is allergic to aspirin, flagyl [metronidazole], ibuprofen, spiriva handihaler [tiotropium bromide monohydrate], and sulfa antibiotics. Patients   Immunization History   Administered Date(s) Administered    COVID-19, J&J, (age 18y+), IM, 0.5 mL 03/16/2021    Pneumococcal Polysaccharide ([id number]) 04/04/2014       FAMILY HISTORY     Patient's family history includes COPD in her father; Cancer in her father; Diabetes in her maternal aunt, maternal grandmother, and mother; Heart Disease in her father and mother; High Blood Pressure in her brother. SOCIAL HISTORY     Patient  reports that she has been smoking cigarettes. She has a 14.85 pack-year smoking history.  She has never used smokeless tobacco. She reports current drug use. Drug: Marijuana Charlotte Roni). She reports that she does not drink alcohol. PHYSICAL EXAM     ED TRIAGE VITALS  BP: (!) 147/72, Temp: 97.3 °F (36.3 °C), Heart Rate: 83, Resp: 16, SpO2: 94 %,Estimated body mass index is 29.95 kg/m² as calculated from the following:    Height as of this encounter: 5' 5\" (1.651 m). Weight as of this encounter: 180 lb (81.6 kg). ,No LMP recorded. Patient is postmenopausal.    Physical Exam  Vitals and nursing note reviewed. Constitutional:       General: She is not in acute distress. Appearance: She is not toxic-appearing or diaphoretic. HENT:      Mouth/Throat:      Mouth: Mucous membranes are moist.   Eyes:      Pupils: Pupils are equal, round, and reactive to light. Cardiovascular:      Rate and Rhythm: Normal rate and regular rhythm. Pulmonary:      Effort: Pulmonary effort is normal. No tachypnea or respiratory distress. Breath sounds: No stridor. Wheezing and rhonchi present. No decreased breath sounds or rales. Chest:      Chest wall: No tenderness or crepitus. Skin:     General: Skin is warm and dry. Neurological:      Mental Status: She is alert and oriented to person, place, and time. DIAGNOSTIC RESULTS     Labs:No results found for this visit on 08/03/22. IMAGING:  None    EKG:  None    URGENT CARE COURSE:     Vitals:    08/03/22 1116   BP: (!) 147/72   Pulse: 83   Resp: 16   Temp: 97.3 °F (36.3 °C)   SpO2: 94%   Weight: 180 lb (81.6 kg)   Height: 5' 5\" (1.651 m)       Medications - No data to display       PROCEDURES:  None    FINAL IMPRESSION      1. COPD exacerbation (HCC)      DISPOSITION/ PLAN   DISPOSITION Decision To Discharge 08/03/2022 11:51:10 AM     With COPD exacerbation. We will treat patient with prednisone tapering dose. Will be supplied with an albuterol inhaler as well. Patient advised to quit smoking. Discussed treatment options and to follow-up with PCP.   Present to the ER for worsening condition. PATIENT REFERRED TO:  [address] / [address] [facility] [place] [address]      DISCHARGE MEDICATIONS:  New Prescriptions    ALBUTEROL SULFATE HFA (VENTOLIN HFA) 108 (90 BASE) MCG/ACT INHALER    Inhale 2 puffs into the lungs 4 times daily as needed for Wheezing or Shortness of Breath    PREDNISONE (DELTASONE) 20 MG TABLET    Take 1 tablet by mouth 2 times daily for 5 days, THEN 1 tablet daily for 5 days.        Discontinued Medications    ALBUTEROL SULFATE HFA (PROAIR HFA) 108 (90 BASE) MCG/ACT INHALER    Inhale 2 puffs into the lungs every 6 hours as needed for Wheezing or Shortness of Breath       Current Discharge Medication List        CONTINUE these medications which have CHANGED    Details   albuterol sulfate HFA (VENTOLIN HFA) 108 (90 Base) MCG/ACT inhaler Inhale 2 puffs into the lungs 4 times daily as needed for Wheezing or Shortness of Breath  Qty: 54 g, Refills: 0             MADAI Kaur CNP    (Please note that portions of this note were completed with a voice recognition program. Efforts were made to edit the dictations but occasionally words are mis-transcribed.)           MADAI Kaur CNP  08/03/22 5980

## 2022-08-04 ENCOUNTER — HOSPITAL ENCOUNTER (OUTPATIENT)
Dept: CT IMAGING | Age: 62
Discharge: HOME | End: 2022-08-04
Payer: MEDICARE

## 2022-08-04 DIAGNOSIS — G89.29 CHRONIC EPIGASTRIC PAIN: ICD-10-CM

## 2022-08-04 DIAGNOSIS — R10.13 CHRONIC EPIGASTRIC PAIN: ICD-10-CM

## 2022-08-04 DIAGNOSIS — R93.5 ABNORMAL FINDINGS ON DIAGNOSTIC IMAGING OF ABDOMEN: ICD-10-CM

## 2022-08-04 DIAGNOSIS — I71.43 ANEURYSM OF INFRARENAL ABDOMINAL AORTA: ICD-10-CM

## 2022-08-04 LAB — POC CREATININE WHOLE BLOOD: 0.8 MG/DL (ref 0.5–1.2)

## 2022-08-04 PROCEDURE — 82565 ASSAY OF CREATININE: CPT

## 2022-08-04 PROCEDURE — 74174 CTA ABD&PLVS W/CONTRAST: CPT

## 2022-08-04 PROCEDURE — 6360000004 HC RX CONTRAST MEDICATION: Performed by: NURSE PRACTITIONER

## 2022-08-04 RX ADMIN — IOPAMIDOL 80 ML: 755 INJECTION, SOLUTION INTRAVENOUS at 09:53

## 2022-09-09 DIAGNOSIS — I69.30 HISTORY OF STROKE WITH RESIDUAL DEFICIT: Chronic | ICD-10-CM

## 2022-09-09 RX ORDER — CLOPIDOGREL BISULFATE 75 MG/1
TABLET ORAL
Qty: 90 TABLET | Refills: 3 | Status: SHIPPED | OUTPATIENT
Start: 2022-09-09

## 2022-09-09 NOTE — TELEPHONE ENCOUNTER
Recent Visits  Date Type Provider Dept   06/16/22 Office Visit Remi Clark, DO [facility]   03/21/22 Office Visit Remi Clark, DO [facility]   03/18/22 Office Visit Remi Clark, DO [facility]   12/08/21 Office Visit Remi Clark, DO [facility]   06/22/21 Office Visit Remi Clark, DO [facility]   Showing recent visits within past 540 days with a meds authorizing provider and meeting all other requirements  Future Appointments  Date Type Provider Dept   12/19/22 Appointment Remi Clark, DO [facility]   Showing future appointments within next 150 days with a meds authorizing provider and meeting all other requirements     Future Appointments   Date Time Provider Kimber Jacinto   12/19/2022  9:40 AM [id number] [facility]

## 2022-10-30 ENCOUNTER — APPOINTMENT (OUTPATIENT)
Dept: GENERAL RADIOLOGY | Age: 62
End: 2022-10-30
Payer: MEDICARE

## 2022-10-30 ENCOUNTER — APPOINTMENT (OUTPATIENT)
Dept: CT IMAGING | Age: 62
End: 2022-10-30
Payer: MEDICARE

## 2022-10-30 ENCOUNTER — HOSPITAL ENCOUNTER (EMERGENCY)
Age: 62
Discharge: HOME | End: 2022-10-30
Attending: EMERGENCY MEDICINE
Payer: MEDICARE

## 2022-10-30 VITALS
DIASTOLIC BLOOD PRESSURE: 73 MMHG | RESPIRATION RATE: 18 BRPM | OXYGEN SATURATION: 92 % | SYSTOLIC BLOOD PRESSURE: 122 MMHG | TEMPERATURE: 97.4 F | HEART RATE: 60 BPM

## 2022-10-30 DIAGNOSIS — R10.9 ABDOMINAL PAIN, VOMITING, AND DIARRHEA: Primary | ICD-10-CM

## 2022-10-30 DIAGNOSIS — R11.10 ABDOMINAL PAIN, VOMITING, AND DIARRHEA: Primary | ICD-10-CM

## 2022-10-30 DIAGNOSIS — R19.7 ABDOMINAL PAIN, VOMITING, AND DIARRHEA: Primary | ICD-10-CM

## 2022-10-30 DIAGNOSIS — K52.9 GASTROENTERITIS: ICD-10-CM

## 2022-10-30 LAB
ALBUMIN SERPL-MCNC: 4.2 G/DL (ref 3.5–5.1)
ALP BLD-CCNC: 115 U/L (ref 38–126)
ALT SERPL-CCNC: 9 U/L (ref 11–66)
AMORPHOUS: ABNORMAL
ANION GAP SERPL CALCULATED.3IONS-SCNC: 13 MEQ/L (ref 8–16)
AST SERPL-CCNC: 14 U/L (ref 5–40)
BACTERIA: ABNORMAL /HPF
BASOPHILS # BLD: 0.3 %
BASOPHILS ABSOLUTE: 0 THOU/MM3 (ref 0–0.1)
BILIRUB SERPL-MCNC: 0.5 MG/DL (ref 0.3–1.2)
BILIRUBIN URINE: NEGATIVE
BLOOD, URINE: NEGATIVE
BUN BLDV-MCNC: 9 MG/DL (ref 7–22)
CALCIUM SERPL-MCNC: 9.8 MG/DL (ref 8.5–10.5)
CASTS 2: ABNORMAL /LPF
CASTS UA: ABNORMAL /LPF
CHARACTER, URINE: CLEAR
CHLORIDE BLD-SCNC: 105 MEQ/L (ref 98–111)
CO2: 23 MEQ/L (ref 23–33)
COLOR: YELLOW
CREAT SERPL-MCNC: 0.9 MG/DL (ref 0.4–1.2)
CRYSTALS, UA: ABNORMAL
EOSINOPHIL # BLD: 0.2 %
EOSINOPHILS ABSOLUTE: 0 THOU/MM3 (ref 0–0.4)
EPITHELIAL CELLS, UA: ABNORMAL /HPF
ERYTHROCYTE [DISTWIDTH] IN BLOOD BY AUTOMATED COUNT: 14.4 % (ref 11.5–14.5)
ERYTHROCYTE [DISTWIDTH] IN BLOOD BY AUTOMATED COUNT: 48.2 FL (ref 35–45)
GFR SERPL CREATININE-BSD FRML MDRD: > 60 ML/MIN/1.73M2
GLUCOSE BLD-MCNC: 122 MG/DL (ref 70–108)
GLUCOSE URINE: NEGATIVE MG/DL
HCT VFR BLD CALC: 48.4 % (ref 37–47)
HEMOGLOBIN: 16.1 GM/DL (ref 12–16)
IMMATURE GRANS (ABS): 0.01 THOU/MM3 (ref 0–0.07)
IMMATURE GRANULOCYTES: 0.1 %
KETONES, URINE: NEGATIVE
LEUKOCYTE ESTERASE, URINE: NEGATIVE
LIPASE: 21.2 U/L (ref 5.6–51.3)
LYMPHOCYTES # BLD: 24.2 %
LYMPHOCYTES ABSOLUTE: 2.2 THOU/MM3 (ref 1–4.8)
MCH RBC QN AUTO: 30.7 PG (ref 26–33)
MCHC RBC AUTO-ENTMCNC: 33.3 GM/DL (ref 32.2–35.5)
MCV RBC AUTO: 92.4 FL (ref 81–99)
MISCELLANEOUS 2: ABNORMAL
MONOCYTES # BLD: 6.5 %
MONOCYTES ABSOLUTE: 0.6 THOU/MM3 (ref 0.4–1.3)
NITRITE, URINE: NEGATIVE
NUCLEATED RED BLOOD CELLS: 0 /100 WBC
OSMOLALITY CALCULATION: 281.3 MOSMOL/KG (ref 275–300)
PH UA: 7.5 (ref 5–9)
PLATELET # BLD: 188 THOU/MM3 (ref 130–400)
PMV BLD AUTO: 11.6 FL (ref 9.4–12.4)
POTASSIUM REFLEX MAGNESIUM: 4.2 MEQ/L (ref 3.5–5.2)
PROTEIN UA: ABNORMAL
RBC # BLD: 5.24 MILL/MM3 (ref 4.2–5.4)
RBC URINE: ABNORMAL /HPF
RENAL EPITHELIAL, UA: ABNORMAL
SEG NEUTROPHILS: 68.7 %
SEGMENTED NEUTROPHILS ABSOLUTE COUNT: 6.1 THOU/MM3 (ref 1.8–7.7)
SODIUM BLD-SCNC: 141 MEQ/L (ref 135–145)
SPECIFIC GRAVITY, URINE: > 1.03 (ref 1–1.03)
TOTAL PROTEIN: 7 G/DL (ref 6.1–8)
UROBILINOGEN, URINE: 0.2 EU/DL (ref 0–1)
WBC # BLD: 8.9 THOU/MM3 (ref 4.8–10.8)
WBC UA: ABNORMAL /HPF
YEAST: ABNORMAL

## 2022-10-30 PROCEDURE — 6360000002 HC RX W HCPCS: Performed by: EMERGENCY MEDICINE

## 2022-10-30 PROCEDURE — 85025 COMPLETE CBC W/AUTO DIFF WBC: CPT

## 2022-10-30 PROCEDURE — C9113 INJ PANTOPRAZOLE SODIUM, VIA: HCPCS | Performed by: EMERGENCY MEDICINE

## 2022-10-30 PROCEDURE — 36415 COLL VENOUS BLD VENIPUNCTURE: CPT

## 2022-10-30 PROCEDURE — 2580000003 HC RX 258: Performed by: EMERGENCY MEDICINE

## 2022-10-30 PROCEDURE — 99285 EMERGENCY DEPT VISIT HI MDM: CPT

## 2022-10-30 PROCEDURE — 80053 COMPREHEN METABOLIC PANEL: CPT

## 2022-10-30 PROCEDURE — 96361 HYDRATE IV INFUSION ADD-ON: CPT

## 2022-10-30 PROCEDURE — 71046 X-RAY EXAM CHEST 2 VIEWS: CPT

## 2022-10-30 PROCEDURE — 74177 CT ABD & PELVIS W/CONTRAST: CPT

## 2022-10-30 PROCEDURE — 96374 THER/PROPH/DIAG INJ IV PUSH: CPT

## 2022-10-30 PROCEDURE — 81001 URINALYSIS AUTO W/SCOPE: CPT

## 2022-10-30 PROCEDURE — 83690 ASSAY OF LIPASE: CPT

## 2022-10-30 PROCEDURE — 96375 TX/PRO/DX INJ NEW DRUG ADDON: CPT

## 2022-10-30 PROCEDURE — 6360000004 HC RX CONTRAST MEDICATION: Performed by: EMERGENCY MEDICINE

## 2022-10-30 RX ORDER — MORPHINE SULFATE 2 MG/ML
2 INJECTION, SOLUTION INTRAMUSCULAR; INTRAVENOUS ONCE
Status: COMPLETED | OUTPATIENT
Start: 2022-10-30 | End: 2022-10-30

## 2022-10-30 RX ORDER — PANTOPRAZOLE SODIUM 40 MG/10ML
40 INJECTION, POWDER, LYOPHILIZED, FOR SOLUTION INTRAVENOUS ONCE
Status: COMPLETED | OUTPATIENT
Start: 2022-10-30 | End: 2022-10-30

## 2022-10-30 RX ORDER — ONDANSETRON 2 MG/ML
4 INJECTION INTRAMUSCULAR; INTRAVENOUS ONCE
Status: COMPLETED | OUTPATIENT
Start: 2022-10-30 | End: 2022-10-30

## 2022-10-30 RX ORDER — SODIUM CHLORIDE 9 MG/ML
INJECTION, SOLUTION INTRAVENOUS CONTINUOUS
Status: DISCONTINUED | OUTPATIENT
Start: 2022-10-30 | End: 2022-10-30 | Stop reason: HOSPADM

## 2022-10-30 RX ORDER — DICYCLOMINE HYDROCHLORIDE 10 MG/1
CAPSULE ORAL
Qty: 15 CAPSULE | Refills: 0 | Status: SHIPPED | OUTPATIENT
Start: 2022-10-30 | End: 2022-11-08 | Stop reason: SDUPTHER

## 2022-10-30 RX ORDER — 0.9 % SODIUM CHLORIDE 0.9 %
500 INTRAVENOUS SOLUTION INTRAVENOUS ONCE
Status: COMPLETED | OUTPATIENT
Start: 2022-10-30 | End: 2022-10-30

## 2022-10-30 RX ORDER — ONDANSETRON 4 MG/1
TABLET, FILM COATED ORAL
Qty: 20 TABLET | Refills: 0 | Status: SHIPPED | OUTPATIENT
Start: 2022-10-30 | End: 2022-11-08 | Stop reason: SDUPTHER

## 2022-10-30 RX ADMIN — SODIUM CHLORIDE 500 ML: 9 INJECTION, SOLUTION INTRAVENOUS at 12:40

## 2022-10-30 RX ADMIN — IOPAMIDOL 80 ML: 755 INJECTION, SOLUTION INTRAVENOUS at 13:10

## 2022-10-30 RX ADMIN — SODIUM CHLORIDE: 9 INJECTION, SOLUTION INTRAVENOUS at 13:30

## 2022-10-30 RX ADMIN — PANTOPRAZOLE SODIUM 40 MG: 40 INJECTION, POWDER, FOR SOLUTION INTRAVENOUS at 12:37

## 2022-10-30 RX ADMIN — ONDANSETRON 4 MG: 2 INJECTION INTRAMUSCULAR; INTRAVENOUS at 12:37

## 2022-10-30 RX ADMIN — MORPHINE SULFATE 2 MG: 2 INJECTION, SOLUTION INTRAMUSCULAR; INTRAVENOUS at 12:37

## 2022-10-30 ASSESSMENT — ENCOUNTER SYMPTOMS
TROUBLE SWALLOWING: 0
SORE THROAT: 0
CONSTIPATION: 0
SHORTNESS OF BREATH: 0
RHINORRHEA: 0
ABDOMINAL PAIN: 1
VOMITING: 1
CHEST TIGHTNESS: 0
DIARRHEA: 1
SINUS PRESSURE: 0
VOICE CHANGE: 0
COUGH: 1
NAUSEA: 1
BACK PAIN: 0
WHEEZING: 1

## 2022-10-30 ASSESSMENT — PAIN DESCRIPTION - LOCATION: LOCATION: ABDOMEN;HEAD

## 2022-10-30 ASSESSMENT — PAIN SCALES - GENERAL
PAINLEVEL_OUTOF10: 1
PAINLEVEL_OUTOF10: 10

## 2022-10-30 ASSESSMENT — PAIN - FUNCTIONAL ASSESSMENT
PAIN_FUNCTIONAL_ASSESSMENT: 0-10

## 2022-10-30 NOTE — ED NOTES
Pt to ED c/o nausea, abdominal pain, and headache. Pt states this started yesterday and has gotten worse. Pt hx of unknown abdominal issues. Pt experiencing chills and sweating. Rates pain 10/10.  VSS     Kayla Nuno RN  10/30/22 1215

## 2022-10-30 NOTE — ED NOTES
Pt medicated per MAR. VSS. Will continue to monitor pain.  Call light in reach      Marco Magana RN  10/30/22 [phone]

## 2022-10-30 NOTE — ED PROVIDER NOTES
[address] [facility]        Room # 45/46A    CHIEF COMPLAINT    Chief Complaint   Patient presents with    Nausea    Abdominal Pain       Nurses Notes reviewed and I agree except as noted in the HPI. HPI    Breana Christianson is a 57 y.o. female who presents for evaluation of abdominal pain nausea vomiting and diarrhea today. Had diarrhea twice watery nonmucous and blood-streaked with vomiting 2 times. Patient's having mid abdominal pain for the past 2 days. Denies any fever however had chills. Pain in the abdomen is crampy initially however today is more constant. Patient relates that she had the same problem 2 months ago and had an extensive work-up including EGD and colonoscopy and did not find anything. The patient had known history of COPD still smokes admits to have been coughing. Denies any chest pain no shortness of breath, she is still smoking    REVIEW OF SYSTEMS    Review of Systems   Constitutional:  Positive for chills. Negative for appetite change, diaphoresis, fatigue and fever. HENT:  Negative for congestion, ear pain, postnasal drip, rhinorrhea, sinus pressure, sneezing, sore throat, trouble swallowing and voice change. Respiratory:  Positive for cough and wheezing. Negative for chest tightness and shortness of breath. Cardiovascular:  Negative for chest pain, palpitations and leg swelling. Gastrointestinal:  Positive for abdominal pain, diarrhea, nausea and vomiting. Negative for constipation. Musculoskeletal:  Negative for arthralgias, back pain, joint swelling, myalgias, neck pain and neck stiffness. Neurological:  Negative for dizziness, syncope, weakness, light-headedness, numbness and headaches.      PAST MEDICAL HISTORY     has a past medical history of AAA (abdominal aortic aneurysm) ([facility] [address] Chronic pancreatitis ([facility] [address] COPD (chronic obstructive pulmonary disease) ([facility] [address] Dyslipidemia, Essential hypertension, Former smoker, GERD (gastroesophageal reflux disease), High cholesterol, History of colon polyps, History of stroke with residual deficit; cryptogenic, Major depression, Obesity (BMI 30-39.9), Osteoarthritis, and Prediabetes. SURGICAL HISTORY   has a past surgical history that includes  section; Carpal tunnel release (Bilateral); Abdomen surgery; Tonsillectomy; Upper gastrointestinal endoscopy (); Colonoscopy (14,); Insertable Cardiac Monitor; and EGD (1019,3362,2980).     CURRENT MEDICATIONS    Previous Medications    ACETAMINOPHEN (TYLENOL) 325 MG TABLET    Take 650-975 mg by mouth every 6 hours as needed for Pain    ALBUTEROL SULFATE HFA (VENTOLIN HFA) 108 (90 BASE) MCG/ACT INHALER    Inhale 2 puffs into the lungs 4 times daily as needed for Wheezing or Shortness of Breath    AMLODIPINE (NORVASC) 10 MG TABLET    take 1 tablet by mouth once daily    ATORVASTATIN (LIPITOR) 80 MG TABLET    take 1 tablet by mouth once daily at bedtime    CLOPIDOGREL (PLAVIX) 75 MG TABLET    take 1 tablet by mouth once daily    ESCITALOPRAM (LEXAPRO) 10 MG TABLET    take 1 tablet by mouth daily    FAMOTIDINE (PEPCID) 20 MG TABLET    take 1 tablet by mouth twice a day    FOLIC ACID (FOLVITE) 1 MG TABLET    take 1 tablet by mouth once daily    PANTOPRAZOLE (PROTONIX) 40 MG TABLET    Take 1 tablet by mouth every morning (before breakfast)    SALMETEROL (SEREVENT DISKUS) 50 MCG/DOSE DISKUS INHALER    Inhale 1 puff into the lungs 2 times daily    SUCRALFATE (CARAFATE) 1 GM TABLET    Take 1 tablet by mouth 4 times daily    TRAZODONE (DESYREL) 50 MG TABLET    take 1 tablet by mouth nightly    VARENICLINE (CHANTIX) 0.5 MG TABLET    0.5mg DAILY for 3 days followed by 0.5mg TWICE DAILY for 4 days followed by 1mg TWICE DAILY, 30 day starter pack    VARENICLINE (CHANTIX) 1 MG TABLET    Take 1 tablet by mouth 2 times daily       ALLERGIES    is allergic to aspirin, flagyl [metronidazole], ibuprofen, spiriva handihaler [tiotropium bromide monohydrate], and sulfa antibiotics. FAMILY HISTORY    She indicated that her mother is . She indicated that her father is . She indicated that both of her brothers are alive. She indicated that her maternal grandmother is . She indicated that her maternal aunt is . She indicated that the status of her neg hx is unknown.   family history includes COPD in her father; Cancer in her father; Diabetes in her maternal aunt, maternal grandmother, and mother; Heart Disease in her father and mother; High Blood Pressure in her brother. SOCIAL HISTORY     reports that she has been smoking cigarettes. She has a 14.85 pack-year smoking history. She has never used smokeless tobacco. She reports current drug use. Drug: Marijuana [employer]). She reports that she does not drink alcohol. PHYSICAL EXAM      INITIAL VITALS: /73   Pulse 60   Temp 97.4 °F (36.3 °C) (Oral)   Resp 18   SpO2 92% Estimated body mass index is 29.95 kg/m² as calculated from the following:    Height as of 8/3/22: 5' 5\" (1.651 m). Weight as of 8/3/22: 180 lb (81.6 kg). Physical Exam  Vitals reviewed. Constitutional:       Appearance: She is well-developed. HENT:      Head: Normocephalic and atraumatic. Right Ear: External ear normal.      Left Ear: External ear normal.      Nose: Nose normal.   Eyes:      General: No scleral icterus. Conjunctiva/sclera: Conjunctivae normal.      Pupils: Pupils are equal, round, and reactive to light. Neck:      Thyroid: No thyromegaly. Vascular: No JVD. Cardiovascular:      Rate and Rhythm: Normal rate and regular rhythm. Heart sounds: No murmur heard. No friction rub. Pulmonary:      Effort: Pulmonary effort is normal.      Breath sounds: Normal breath sounds. No wheezing or rales. Chest:      Chest wall: No tenderness. Abdominal:      General: Bowel sounds are normal.      Palpations: Abdomen is soft.  There is no mass. Tenderness: There is abdominal tenderness in the epigastric area and periumbilical area. There is no right CVA tenderness, left CVA tenderness, guarding or rebound. Negative signs include McBurney's sign. Musculoskeletal:      Cervical back: Normal range of motion and neck supple. Lymphadenopathy:      Cervical: No cervical adenopathy. Skin:     Findings: No rash. Neurological:      Mental Status: She is alert and oriented to person, place, and time. Psychiatric:         Behavior: Behavior is cooperative. MEDICAL DECISION MAKING    DIFFERENTIAL DIAGNOSIS:  Abdominal pain nausea vomiting diarrhea gastroenteritis Crohn's disease ulcerative colitis infectious diarrhea      DIAGNOSTIC RESULTS      RADIOLOGY:  I have reviewed radiologic plain film image(s). The plain films will be read or overread by the radiologist.All other non-plain film images(s) such as CT, Ultrasound and MRI have been read by the radiologist.  CT ABDOMEN PELVIS W IV CONTRAST Additional Contrast? None   Final Result   1. No acute intra-abdominal or intrapelvic findings. 2. Stable aneurysm of the infrarenal abdominal aorta. Final report electronically signed by Dr. Nathen Nails on 10/30/2022 1:21 PM      XR CHEST (2 VW)   Final Result      No acute intrathoracic process. **This report has been created using voice recognition software. It may contain minor errors which are inherent in voice recognition technology. **      Final report electronically signed by Dr. Nathen Nails on 10/30/2022 1:05 PM          LABS:   Labs Reviewed   CBC WITH AUTO DIFFERENTIAL - Abnormal; Notable for the following components:       Result Value    Hemoglobin 16.1 (*)     Hematocrit 48.4 (*)     RDW-SD 48.2 (*)     All other components within normal limits   COMPREHENSIVE METABOLIC PANEL W/ REFLEX TO MG FOR LOW K - Abnormal; Notable for the following components:    Glucose 122 (*)     ALT 9 (*)     All other components within normal limits   URINE WITH REFLEXED MICRO - Abnormal; Notable for the following components:    Specific Gravity, Urine > 1.030 (*)     Protein, UA TRACE (*)     All other components within normal limits   LIPASE   GLOMERULAR FILTRATION RATE, ESTIMATED   ANION GAP   OSMOLALITY     All other unresulted laboratory test above are normal:    Vitals:    Vitals:    10/30/22 1200 10/30/22 1243 10/30/22 1347   BP: (!) 179/73 110/78 122/73   Pulse: 52 54 60   Resp: 20 18 18   Temp: 97.4 °F (36.3 °C)     TempSrc: Oral     SpO2: 96% 97% 92%       EMERGENCY DEPARTMENT COURSE:    Medications   0.9 % sodium chloride infusion ( IntraVENous New Bag 10/30/22 1330)   0.9 % sodium chloride bolus (0 mLs IntraVENous Stopped 10/30/22 1348)   ondansetron (ZOFRAN) injection 4 mg (4 mg IntraVENous Given 10/30/22 1237)   morphine (PF) injection 2 mg (2 mg IntraVENous Given 10/30/22 1237)   pantoprazole (PROTONIX) injection 40 mg (40 mg IntraVENous Given 10/30/22 1237)   iopamidol (ISOVUE-370) 76 % injection 80 mL (80 mLs IntraVENous Given 10/30/22 1310)       The pt was seen and evaluated by me. Within the department, I observed the pt's vitalsigns to be within acceptable range. Laboratory and Radiological studies were performed, results were reviewed with the patient. Within the department, the pt was treated with IV fluid hydration Zofran Protonix and morphine. When reevaluated the patient's is much better and ready to go home. I observed the pt's condition to be hemodynamically stable during the duration of their stay. I explained my proposed course of treatment to the pt, and they were amenable to my decision. They were discharged home, and they will return to the ED if their symptoms become more severein nature, or otherwise change. Controlled Substances Monitoring:        CRITICAL CARE:   None. CONSULTS:  None    PROCEDURES:  None. FINAL IMPRESSION       1. Abdominal pain, vomiting, and diarrhea    2. Gastroenteritis          DISPOSITION/PLAN  PATIENT REFERRED TO:  Ervin PAYNE AM OFFDEIRDREGG EPI [address]  [phone]    Schedule an appointment as soon as possible for a visit in 3 days  As needed  DISCHARGE MEDICATIONS:  New Prescriptions    DICYCLOMINE (BENTYL) 10 MG CAPSULE    1 cupule every 6-8 hours for abdominal cramps when necessary    ONDANSETRON (ZOFRAN) 4 MG TABLET    1 tablet every 6-8 hours for nausea vomiting         (Please note that portions of this note were completed with a voice recognition program and electronically transcribed. Efforts were [facility] edit the dictations but occasionally words are mis-transcribed . The transcription may contain errors not detected in proofreading.   This transcription was electronically signed.)     10/30/22 3:10 PM      Shirley Park MD      Emergency room physician             Shirley Park MD  10/30/22 0064

## 2022-11-01 ENCOUNTER — HOSPITAL ENCOUNTER (EMERGENCY)
Age: 62
Discharge: HOME | End: 2022-11-01
Attending: EMERGENCY MEDICINE
Payer: MEDICARE

## 2022-11-01 ENCOUNTER — APPOINTMENT (OUTPATIENT)
Dept: GENERAL RADIOLOGY | Age: 62
End: 2022-11-01
Payer: MEDICARE

## 2022-11-01 VITALS
OXYGEN SATURATION: 97 % | DIASTOLIC BLOOD PRESSURE: 75 MMHG | TEMPERATURE: 98 F | BODY MASS INDEX: 28.66 KG/M2 | WEIGHT: 172 LBS | RESPIRATION RATE: 16 BRPM | HEART RATE: 52 BPM | SYSTOLIC BLOOD PRESSURE: 172 MMHG | HEIGHT: 65 IN

## 2022-11-01 DIAGNOSIS — R10.84 GENERALIZED ABDOMINAL PAIN: Primary | ICD-10-CM

## 2022-11-01 LAB
ALBUMIN SERPL-MCNC: 4.3 G/DL (ref 3.5–5.1)
ALP BLD-CCNC: 100 U/L (ref 38–126)
ALT SERPL-CCNC: 11 U/L (ref 11–66)
ANION GAP SERPL CALCULATED.3IONS-SCNC: 11 MEQ/L (ref 8–16)
AST SERPL-CCNC: 18 U/L (ref 5–40)
BACTERIA: ABNORMAL /HPF
BASOPHILS # BLD: 0.3 %
BASOPHILS ABSOLUTE: 0 THOU/MM3 (ref 0–0.1)
BILIRUB SERPL-MCNC: 0.5 MG/DL (ref 0.3–1.2)
BILIRUBIN DIRECT: < 0.2 MG/DL (ref 0–0.3)
BILIRUBIN URINE: NEGATIVE
BLOOD, URINE: ABNORMAL
BUN BLDV-MCNC: 10 MG/DL (ref 7–22)
CALCIUM SERPL-MCNC: 9.3 MG/DL (ref 8.5–10.5)
CASTS 2: ABNORMAL /LPF
CASTS UA: ABNORMAL /LPF
CHARACTER, URINE: ABNORMAL
CHLORIDE BLD-SCNC: 104 MEQ/L (ref 98–111)
CO2: 26 MEQ/L (ref 23–33)
COLOR: YELLOW
CREAT SERPL-MCNC: 1 MG/DL (ref 0.4–1.2)
CRYSTALS, UA: ABNORMAL
EKG ATRIAL RATE: 50 BPM
EKG P AXIS: 47 DEGREES
EKG P-R INTERVAL: 148 MS
EKG Q-T INTERVAL: 484 MS
EKG QRS DURATION: 132 MS
EKG QTC CALCULATION (BAZETT): 441 MS
EKG R AXIS: 70 DEGREES
EKG T AXIS: 34 DEGREES
EKG VENTRICULAR RATE: 50 BPM
EOSINOPHIL # BLD: 0.3 %
EOSINOPHILS ABSOLUTE: 0 THOU/MM3 (ref 0–0.4)
EPITHELIAL CELLS, UA: ABNORMAL /HPF
ERYTHROCYTE [DISTWIDTH] IN BLOOD BY AUTOMATED COUNT: 14.5 % (ref 11.5–14.5)
ERYTHROCYTE [DISTWIDTH] IN BLOOD BY AUTOMATED COUNT: 49.7 FL (ref 35–45)
GFR SERPL CREATININE-BSD FRML MDRD: > 60 ML/MIN/1.73M2
GLUCOSE BLD-MCNC: 108 MG/DL (ref 70–108)
GLUCOSE URINE: NEGATIVE MG/DL
HCT VFR BLD CALC: 48.2 % (ref 37–47)
HEMOGLOBIN: 15.6 GM/DL (ref 12–16)
IMMATURE GRANS (ABS): 0.03 THOU/MM3 (ref 0–0.07)
IMMATURE GRANULOCYTES: 0.3 %
KETONES, URINE: ABNORMAL
LEUKOCYTE ESTERASE, URINE: NEGATIVE
LIPASE: 35.2 U/L (ref 5.6–51.3)
LYMPHOCYTES # BLD: 24.7 %
LYMPHOCYTES ABSOLUTE: 2.3 THOU/MM3 (ref 1–4.8)
MCH RBC QN AUTO: 30.2 PG (ref 26–33)
MCHC RBC AUTO-ENTMCNC: 32.4 GM/DL (ref 32.2–35.5)
MCV RBC AUTO: 93.2 FL (ref 81–99)
MISCELLANEOUS 2: ABNORMAL
MONOCYTES # BLD: 6.4 %
MONOCYTES ABSOLUTE: 0.6 THOU/MM3 (ref 0.4–1.3)
NITRITE, URINE: NEGATIVE
NUCLEATED RED BLOOD CELLS: 0 /100 WBC
OSMOLALITY CALCULATION: 280.8 MOSMOL/KG (ref 275–300)
PH UA: 5 (ref 5–9)
PLATELET # BLD: 192 THOU/MM3 (ref 130–400)
PMV BLD AUTO: 11.5 FL (ref 9.4–12.4)
POTASSIUM SERPL-SCNC: 4.3 MEQ/L (ref 3.5–5.2)
PROTEIN UA: ABNORMAL
RBC # BLD: 5.17 MILL/MM3 (ref 4.2–5.4)
RBC URINE: ABNORMAL /HPF
RENAL EPITHELIAL, UA: ABNORMAL
SEG NEUTROPHILS: 68 %
SEGMENTED NEUTROPHILS ABSOLUTE COUNT: 6.3 THOU/MM3 (ref 1.8–7.7)
SODIUM BLD-SCNC: 141 MEQ/L (ref 135–145)
SPECIFIC GRAVITY, URINE: 1.02 (ref 1–1.03)
TOTAL PROTEIN: 6.3 G/DL (ref 6.1–8)
TROPONIN T: < 0.01 NG/ML
UROBILINOGEN, URINE: 0.2 EU/DL (ref 0–1)
WBC # BLD: 9.3 THOU/MM3 (ref 4.8–10.8)
WBC UA: ABNORMAL /HPF
YEAST: ABNORMAL

## 2022-11-01 PROCEDURE — 80053 COMPREHEN METABOLIC PANEL: CPT

## 2022-11-01 PROCEDURE — 36415 COLL VENOUS BLD VENIPUNCTURE: CPT

## 2022-11-01 PROCEDURE — 96375 TX/PRO/DX INJ NEW DRUG ADDON: CPT

## 2022-11-01 PROCEDURE — 84484 ASSAY OF TROPONIN QUANT: CPT

## 2022-11-01 PROCEDURE — 82248 BILIRUBIN DIRECT: CPT

## 2022-11-01 PROCEDURE — 6360000002 HC RX W HCPCS: Performed by: EMERGENCY MEDICINE

## 2022-11-01 PROCEDURE — 99285 EMERGENCY DEPT VISIT HI MDM: CPT | Performed by: EMERGENCY MEDICINE

## 2022-11-01 PROCEDURE — 93005 ELECTROCARDIOGRAM TRACING: CPT | Performed by: EMERGENCY MEDICINE

## 2022-11-01 PROCEDURE — 83690 ASSAY OF LIPASE: CPT

## 2022-11-01 PROCEDURE — C9113 INJ PANTOPRAZOLE SODIUM, VIA: HCPCS | Performed by: EMERGENCY MEDICINE

## 2022-11-01 PROCEDURE — 93010 ELECTROCARDIOGRAM REPORT: CPT | Performed by: INTERNAL MEDICINE

## 2022-11-01 PROCEDURE — 85025 COMPLETE CBC W/AUTO DIFF WBC: CPT

## 2022-11-01 PROCEDURE — 71045 X-RAY EXAM CHEST 1 VIEW: CPT

## 2022-11-01 PROCEDURE — 96374 THER/PROPH/DIAG INJ IV PUSH: CPT

## 2022-11-01 PROCEDURE — 81001 URINALYSIS AUTO W/SCOPE: CPT

## 2022-11-01 RX ORDER — PANTOPRAZOLE SODIUM 40 MG/10ML
40 INJECTION, POWDER, LYOPHILIZED, FOR SOLUTION INTRAVENOUS ONCE
Status: COMPLETED | OUTPATIENT
Start: 2022-11-01 | End: 2022-11-01

## 2022-11-01 RX ORDER — ONDANSETRON 2 MG/ML
4 INJECTION INTRAMUSCULAR; INTRAVENOUS ONCE
Status: COMPLETED | OUTPATIENT
Start: 2022-11-01 | End: 2022-11-01

## 2022-11-01 RX ORDER — MORPHINE SULFATE 2 MG/ML
2 INJECTION, SOLUTION INTRAMUSCULAR; INTRAVENOUS ONCE
Status: COMPLETED | OUTPATIENT
Start: 2022-11-01 | End: 2022-11-01

## 2022-11-01 RX ADMIN — PANTOPRAZOLE SODIUM 40 MG: 40 INJECTION, POWDER, FOR SOLUTION INTRAVENOUS at 11:41

## 2022-11-01 RX ADMIN — ONDANSETRON 4 MG: 2 INJECTION INTRAMUSCULAR; INTRAVENOUS at 11:41

## 2022-11-01 RX ADMIN — MORPHINE SULFATE 2 MG: 2 INJECTION, SOLUTION INTRAMUSCULAR; INTRAVENOUS at 11:41

## 2022-11-01 ASSESSMENT — ENCOUNTER SYMPTOMS
NAUSEA: 1
COUGH: 1
VOMITING: 1
COLOR CHANGE: 0
DIARRHEA: 1
RHINORRHEA: 0
SHORTNESS OF BREATH: 0
EYE REDNESS: 0
ABDOMINAL PAIN: 1
EYE PAIN: 0

## 2022-11-01 NOTE — ED NOTES
Pt resting in bed. Resp regular. Denies needs. Call light in reach.       Franklyn Mai RN  11/01/22 6547

## 2022-11-01 NOTE — ED NOTES
Pt to er. Pt c/o abd pain and emesis. States was seen here on Saturday and dx with a stomach virus. States had CT and xray and blood work done and was negative for anything. Pt states pain has not gotten any better. Assessment completed. Resp regular. CAll light in reach.       Kelsey Valdez RN  11/01/22 1807

## 2022-11-01 NOTE — ED PROVIDER NOTES
[facility] ENCOUNTER          Pt Name: Yamil Pal  MRN: [id number]  [place] 1960  Date of evaluation: 11/1/2022  Treating Resident Physician: Angeles Ramsey DO  Supervising Physician: Jumana Jackson DO    History obtained from the patient. CHIEF COMPLAINT       Chief Complaint   Patient presents with    Abdominal Pain           HISTORY OF PRESENT ILLNESS    HPI  Yamil Pal is a 57 y.o. female who presents to the emergency department for evaluation of abdominal pain, vomiting, and diarrhea. Patient has been having these symptoms for the past 6 months, they have been fluctuating. This is worsened this past Friday, for which she was seen at this ED on Saturday. She had a work-up including a CT abdomen pelvis which came back negative. Her pain has not changed or worsened since then, it just has not resolved. Patient denies fever, chills, hematemesis, hematochezia, cough, shortness of breath, chest pain. The patient has no other acute complaints at this time. REVIEW OF SYSTEMS   Review of Systems   Constitutional:  Negative for chills and fever. HENT:  Negative for congestion and rhinorrhea. Eyes:  Negative for pain and redness. Respiratory:  Positive for cough. Negative for shortness of breath. Cardiovascular:  Negative for chest pain and palpitations. Gastrointestinal:  Positive for abdominal pain, diarrhea, nausea and vomiting. Genitourinary:  Negative for difficulty urinating and dysuria. Musculoskeletal:  Negative for gait problem and joint swelling. Skin:  Negative for color change and pallor. Neurological:  Negative for facial asymmetry and speech difficulty.        PAST MEDICAL AND SURGICAL HISTORY     Past Medical History:   Diagnosis Date    AAA (abdominal aortic aneurysm) 04/18/2014    Follows with Dr. Olive Palacio group    Chronic pancreatitis [facility])     COPD (chronic obstructive pulmonary disease) ([facility] [address] Dyslipidemia     Essential hypertension 2014    Former smoker     GERD (gastroesophageal reflux disease)     High cholesterol     History of colon polyps     History of stroke with residual deficit; cryptogenic 2017    Follows with neuro and cardio at [facility]    Major depression     Obesity (BMI 30-39. 9)     Osteoarthritis     Prediabetes      Past Surgical History:   Procedure Laterality Date    ABDOMEN SURGERY      c sections x3    CARPAL TUNNEL RELEASE Bilateral      SECTION      x3    COLONOSCOPY      Dr. Vel Bean    EGD  [id number]    [address] [facility],  [address]      as child    UPPER GASTROINTESTINAL ENDOSCOPY           MEDICATIONS   No current facility-administered medications for this encounter.     Current Outpatient Medications:     ondansetron (ZOFRAN) 4 MG tablet, 1 tablet every 6-8 hours for nausea vomiting, Disp: 20 tablet, Rfl: 0    dicyclomine (BENTYL) 10 MG capsule, 1 cupule every 6-8 hours for abdominal cramps when necessary, Disp: 15 capsule, Rfl: 0    clopidogrel (PLAVIX) 75 MG tablet, take 1 tablet by mouth once daily, Disp: 90 tablet, Rfl: 3    albuterol sulfate HFA (VENTOLIN HFA) 108 (90 Base) MCG/ACT inhaler, Inhale 2 puffs into the lungs 4 times daily as needed for Wheezing or Shortness of Breath, Disp: 54 g, Rfl: 0    famotidine (PEPCID) 20 MG tablet, take 1 tablet by mouth twice a day, Disp: 180 tablet, Rfl: 3    varenicline (CHANTIX) 0.5 MG tablet, 0.5mg DAILY for 3 days followed by 0.5mg TWICE DAILY for 4 days followed by 1mg TWICE DAILY, 30 day starter pack (Patient not taking: Reported on 2022), Disp: 57 tablet, Rfl: 0    varenicline (CHANTIX) 1 MG tablet, Take 1 tablet by mouth 2 times daily (Patient not taking: Reported on 2022), Disp: 60 tablet, Rfl: 1    traZODone (DESYREL) 50 MG tablet, take 1 tablet by mouth nightly, Disp: 90 tablet, Rfl: 3    sucralfate (CARAFATE) 1 GM tablet, Take 1 tablet by mouth 4 times daily, Disp: 120 tablet, Rfl: 0    escitalopram (LEXAPRO) 10 MG tablet, take 1 tablet by mouth daily, Disp: 90 tablet, Rfl: 3    amLODIPine (NORVASC) 10 MG tablet, take 1 tablet by mouth once daily, Disp: 90 tablet, Rfl: 3    atorvastatin (LIPITOR) 80 MG tablet, take 1 tablet by mouth once daily at bedtime, Disp: 90 tablet, Rfl: 3    folic acid (FOLVITE) 1 MG tablet, take 1 tablet by mouth once daily, Disp: 90 tablet, Rfl: 3    pantoprazole (PROTONIX) 40 MG tablet, Take 1 tablet by mouth every morning (before breakfast), Disp: 90 tablet, Rfl: 3    salmeterol (SEREVENT DISKUS) 50 MCG/DOSE diskus inhaler, Inhale 1 puff into the lungs 2 times daily, Disp: 1 each, Rfl: 3    acetaminophen (TYLENOL) 325 MG tablet, Take 650-975 mg by mouth every 6 hours as needed for Pain, Disp: , Rfl:       SOCIAL HISTORY     Social History     Social History Narrative    Not on file     Social History     Tobacco Use    Smoking status: Every Day     Packs/day: 0.33     Years: 45.00     Pack years: 14.85     Types: Cigarettes     Last attempt to quit: 12/9/2018     Years since quitting: 3.8    Smokeless tobacco: Never   Vaping Use    Vaping Use: Never used   Substance Use Topics    Alcohol use: No     Alcohol/week: 0.0 standard drinks    Drug use: Yes     Types: Marijuana (Weed)     Comment: daily         ALLERGIES     Allergies   Allergen Reactions    Aspirin      Upset stomach    Flagyl [Metronidazole] Other (See Comments)     abd pain    Ibuprofen      Stomach pain    Spiriva Handihaler [Tiotropium Bromide Monohydrate] Other (See Comments)     Upper chest pain     Sulfa Antibiotics Other (See Comments)     PT WAS KID DOESNT KNOW         FAMILY HISTORY     Family History   Problem Relation Age of Onset    Diabetes Maternal Grandmother     Diabetes Mother     Heart Disease Mother     Cancer Father         Something in the chest, pt not sure what    Heart Disease Father     COPD Father     Diabetes Maternal Aunt     High Blood Pressure Brother There is no guarding or rebound. Musculoskeletal:         General: Normal range of motion. Cervical back: Normal range of motion and neck supple. No tenderness. Lymphadenopathy:      Cervical: No cervical adenopathy. Skin:     General: Skin is warm and dry. Capillary Refill: Capillary refill takes less than 2 seconds. Neurological:      General: No focal deficit present. Mental Status: She is alert and oriented to person, place, and time. Psychiatric:         Mood and Affect: Mood normal.           MEDICAL DECISION MAKING   Initial Assessment:   Patient is a nontoxic-appearing 53-year-old female who presents with diffuse abdominal pain that has been going on for the past 6 months but is worsened since 4 to 5 days ago. 3 days ago she was seen at this facility and had a work up including CT A/P which did not reveal any acute findings. Her pain has not changed in severity, location, or characterization since then. Pt also had a work up by GI a couple months ago including colonoscopy and EGD which was negative. Suspect this is a mild exacerbation of her chronic abdominal pain. Pt's vitals are stable, she is afebrile. Ddx: Chronic abdominal pain, pancreatitis, abdominal cramps, ACS, crohn's, cholelithiasis, AAA  Plan:   Labs  CXR  Morphine, Zofran, Protonix  IVF  Pt's pain improved with Zofran and she is feeling much better after fluids, Zofran, and Protonix  Discussed with the Pt that her pain is chronic and we are unlikely to diagnosis the problem after GI has already worked her up and did not find anything definitive.          ED RESULTS   Laboratory results:  Labs Reviewed   CBC WITH AUTO DIFFERENTIAL - Abnormal; Notable for the following components:       Result Value    Hematocrit 48.2 (*)     RDW-SD 49.7 (*)     All other components within normal limits   URINE WITH REFLEXED MICRO - Abnormal; Notable for the following components:    Ketones, Urine TRACE (*)     Blood, Urine TRACE (*) Protein, UA TRACE (*)     All other components within normal limits   BASIC METABOLIC PANEL   HEPATIC FUNCTION PANEL   LIPASE   GLOMERULAR FILTRATION RATE, ESTIMATED   ANION GAP   OSMOLALITY   TROPONIN       Radiologic studies results:  XR CHEST PORTABLE   Final Result   1. No acute cardiopulmonary finding. **This report has been created using voice recognition software. It may contain minor errors which are inherent in voice recognition technology. **      Final report electronically signed by Dr Onesimo Ames on 11/1/2022 12:29 PM          ED Medications administered this visit:   Medications   morphine (PF) injection 2 mg (2 mg IntraVENous Given 11/1/22 1141)   ondansetron (ZOFRAN) injection 4 mg (4 mg IntraVENous Given 11/1/22 1141)   pantoprazole (PROTONIX) injection 40 mg (40 mg IntraVENous Given 11/1/22 1141)         ED COURSE          Strict return precautions and follow up instructions were discussed with the patient prior to discharge, with which the patient agrees. MEDICATION CHANGES     New Prescriptions    No medications on file         FINAL DISPOSITION     Final diagnoses:   Generalized abdominal pain     Condition: condition: fair  Dispo: Discharge to home      This transcription was electronically signed. Parts of this transcriptions may have been dictated by use of voice recognition software and electronically transcribed, and parts may have been transcribed with the assistance of an ED scribe. The transcription may contain errors not detected in proofreading. Please refer to my supervising physician's documentation if my documentation differs.     Electronically Signed: Carly Weathers DO, 11/01/22, 12:58 PM        Carly Weathers DO  Resident  11/01/22 1300

## 2022-11-01 NOTE — DISCHARGE INSTRUCTIONS
Follow up with your primary care doctor. Return here if your pain worsens or if it changes in quality or location.

## 2022-11-07 NOTE — PROGRESS NOTES
Chief Complaint   Patient presents with    Follow-up     Stomach issues. History obtained from the patient.     SUBJECTIVE:  Gabby Deluca is a 57 y.o. female that presents today for     -ER f/u:  Chronic epigastric abd pain  Started in March of this year  Referred to GI  No obvious cause despite EGD, colo CT abd/pelvis and CTA abdomen  Sxs had actually improved some in Aug and SEPT  But late OCT sxs returned  Last saw Lennie Arora, end of July 2022  At that point was referred to pain management, but she never heard back from them  Having inc pain in epigastrium again  4-7/10  Epigastric  Does not radiate  Not related to food  Is taking pantoprazole and pepcid  Some nausea  No vomiting  No blood in stool  No melena  Hasn't seen GI back since sxs returned  Stopped carafate since not helpful  Bentyl and zofran helping some  Wts stable      Age/Gender Health Maintenance    Lipid -   Lab Results   Component Value Date    CHOL 149 06/06/2022    CHOL 137 12/06/2021    CHOL 161 11/05/2020     Lab Results   Component Value Date    TRIG 143 06/06/2022    TRIG 112 12/06/2021    TRIG 154 11/05/2020     Lab Results   Component Value Date    HDL 46 06/06/2022    HDL 48 12/06/2021    HDL 54 11/05/2020     Lab Results   Component Value Date    LDLCALC 74 06/06/2022    LDLCALC 67 12/06/2021    LDLCALC 76 11/05/2020     Lab Results   Component Value Date    VLDL 33 12/14/2011     No results found for: CHOLHDLRATIO      DM Screen - 101 fasting, (June 2018)  Lab Results   Component Value Date/Time    GLUCOSE 108 11/01/2022 11:15 AM    GLUCOSE 143 10/15/2019 02:00 PM     Lab Results   Component Value Date/Time    LABA1C 5.8 06/06/2022 09:53 AM    LABA1C 5.8 12/06/2021 09:17 AM    LABA1C 5.6 06/15/2021 09:40 AM    LABA1C 6.1 11/05/2020 08:17 AM    LABA1C 6.3 01/22/2019 08:44 AM    LABA1C 5.6 05/15/2017 11:30 AM       Colon Cancer Screening - + sessile T/a FEB 2019, repeat 2 years per GI, Catia/+ T/A July 2022, repeat 5 years per GI, Catia  Lung Cancer Screening - NEG JAN 2022    Tetanus - to get at pharmacy per medicare rules  Influenza Vaccine - UTD FALL 2022  Pneumonia Vaccine - UTD PPV 23 APR 2014  Zoster - to get at pharmacy per medicare rules    Breast Cancer Screening - NEG JAN 2021, due, ordered June 2022  Cervical Cancer Screening - NEG FEB 2019   Osteoporosis Screening - age 70      Specialist List  Neuro: Yayo  GI: Teresa Walker: [address]  Cardio: [address]  [facility]: [facility]      Current Outpatient Medications   Medication Sig Dispense Refill    gabapentin (NEURONTIN) 300 MG capsule Take 1 capsule by mouth 3 times daily for 90 days.  90 capsule 2    pantoprazole (PROTONIX) 40 MG tablet Take 1 tablet by mouth every morning (before breakfast) 90 tablet 3    dicyclomine (BENTYL) 10 MG capsule Take 1 capsule by mouth 4 times daily (before meals and nightly) 120 capsule 2    ondansetron (ZOFRAN) 4 MG tablet Take 1 tablet by mouth every 8 hours as needed for Nausea or Vomiting 1 tablet every 6-8 hours for nausea vomiting 90 tablet 0    clopidogrel (PLAVIX) 75 MG tablet take 1 tablet by mouth once daily 90 tablet 3    albuterol sulfate HFA (VENTOLIN HFA) 108 (90 Base) MCG/ACT inhaler Inhale 2 puffs into the lungs 4 times daily as needed for Wheezing or Shortness of Breath 54 g 0    famotidine (PEPCID) 20 MG tablet take 1 tablet by mouth twice a day 180 tablet 3    varenicline (CHANTIX) 0.5 MG tablet 0.5mg DAILY for 3 days followed by 0.5mg TWICE DAILY for 4 days followed by 1mg TWICE DAILY, 30 day starter pack 57 tablet 0    varenicline (CHANTIX) 1 MG tablet Take 1 tablet by mouth 2 times daily 60 tablet 1    traZODone (DESYREL) 50 MG tablet take 1 tablet by mouth nightly 90 tablet 3    escitalopram (LEXAPRO) 10 MG tablet take 1 tablet by mouth daily 90 tablet 3    amLODIPine (NORVASC) 10 MG tablet take 1 tablet by mouth once daily 90 tablet 3    atorvastatin (LIPITOR) 80 MG tablet take 1 tablet by mouth once daily at bedtime 90 tablet 3 folic acid (FOLVITE) 1 MG tablet take 1 tablet by mouth once daily 90 tablet 3    salmeterol (SEREVENT DISKUS) 50 MCG/DOSE diskus inhaler Inhale 1 puff into the lungs 2 times daily 1 each 3    acetaminophen (TYLENOL) 325 MG tablet Take 650-975 mg by mouth every 6 hours as needed for Pain       No current facility-administered medications for this visit. Orders Placed This Encounter   Medications    gabapentin (NEURONTIN) 300 MG capsule     Sig: Take 1 capsule by mouth 3 times daily for 90 days. Dispense:  90 capsule     Refill:  2    pantoprazole (PROTONIX) 40 MG tablet     Sig: Take 1 tablet by mouth every morning (before breakfast)     Dispense:  90 tablet     Refill:  3    dicyclomine (BENTYL) 10 MG capsule     Sig: Take 1 capsule by mouth 4 times daily (before meals and nightly)     Dispense:  120 capsule     Refill:  2    ondansetron (ZOFRAN) 4 MG tablet     Sig: Take 1 tablet by mouth every 8 hours as needed for Nausea or Vomiting 1 tablet every 6-8 hours for nausea vomiting     Dispense:  90 tablet     Refill:  0           All medications reviewed and reconciled, including OTC and herbal medications. Updated list given to patient. Patient Active Problem List    Diagnosis Date Noted    Major depression     Prediabetes     Chronic bilateral low back pain with left-sided sciatica 06/12/2018    GERD (gastroesophageal reflux disease)     Former smoker     Osteoarthritis     Dyslipidemia 05/19/2017    History of stroke with residual deficit; cryptogenic 05/15/2017     Followed with neuro at [facility], last note states can f/u prn.  Also follows with cardio at [facility]        Obesity (BMI 30-39.9) 05/08/2014    Essential hypertension 05/08/2014    AAA (abdominal aortic aneurysm) 04/18/2014    COPD (chronic obstructive pulmonary disease) (HCC)        Past Medical History:   Diagnosis Date    AAA (abdominal aortic aneurysm) 04/18/2014    Follows with Dr. [facility] group    Chronic pancreatitis [facility])     COPD (chronic obstructive pulmonary disease) ([facility] [address]     Dyslipidemia     Essential hypertension 2014    Former smoker     GERD (gastroesophageal reflux disease)     High cholesterol     History of colon polyps     History of stroke with residual deficit; cryptogenic 2017    Follows with neuro and cardio at [facility]    Major depression     Obesity (BMI 30-39. 9)     Osteoarthritis     Prediabetes        Past Surgical History:   Procedure Laterality Date    ABDOMEN SURGERY      c sections x3    CARPAL TUNNEL RELEASE Bilateral      SECTION      x3    COLONOSCOPY      Dr. [facility]    EGD  [id number]    [address] [place] [address],  [address]      as child    UPPER GASTROINTESTINAL ENDOSCOPY         Allergies   Allergen Reactions    Aspirin      Upset stomach    Flagyl [Metronidazole] Other (See Comments)     abd pain    Ibuprofen      Stomach pain    Spiriva Handihaler [Tiotropium Bromide Monohydrate] Other (See Comments)     Upper chest pain     Sulfa Antibiotics Other (See Comments)     PT WAS KID DOESNT KNOW       Social History     Tobacco Use    Smoking status: Every Day     Packs/day: 0.33     Years: 45.00     Pack years: 14.85     Types: Cigarettes     Last attempt to quit: 2018     Years since quitting: 3.9    Smokeless tobacco: Never   Substance Use Topics    Alcohol use: No     Alcohol/week: 0.0 standard drinks       Family History   Problem Relation Age of Onset    Diabetes Maternal Grandmother     Diabetes Mother     Heart Disease Mother     Cancer Father         Something in the chest, pt not sure what    Heart Disease Father     COPD Father     Diabetes Maternal Aunt     High Blood Pressure Brother     Arthritis Neg Hx     Asthma Neg Hx     Birth Defects Neg Hx     Depression Neg Hx     Early Death Neg Hx     Hearing Loss Neg Hx     High Cholesterol Neg Hx     Kidney Disease Neg Hx     Learning Disabilities Neg Hx     Mental Illness Neg Hx     Mental Retardation Neg Hx Miscarriages / Stillbirths Neg Hx     Stroke Neg Hx     Substance Abuse Neg Hx     Vision Loss Neg Hx     Breast Cancer Neg Hx     Colon Cancer Neg Hx          I have reviewed the patient's past medical history, past surgical history, allergies, medications, social and family history and I have made updates where appropriate. Review of Systems  Positive responses are highlighted in bold    Constitutional:  Fever, Chills, Night Sweats, Fatigue, Unexpected changes in weight  HENT:  Ear pain, Tinnitus, Nosebleeds, Trouble swallowing, Hearing loss, Sore throat  Cardiovascular:  Chest Pain, Palpitations, Orthopnea, Paroxysmal Nocturnal Dyspnea  Respiratory:  Cough, Wheezing, Shortness of breath, Chest tightness, Apnea  Gastrointestinal:  Nausea, Vomiting, Diarrhea, Constipation, Heartburn, Blood in stool  Genitourinary:  Difficulty or painful urination, Flank pain, Change in frequency, Urgency  Skin:  Color change, Rash, Itching, Wound  Musculoskeletal:  Joint pain, Back pain, Gait problems, Joint swelling, Myalgias  Neurological:  Dizziness, Headaches, Presyncope, Numbness, Seizures, Tremors  Endocrine:  Heat Intolerance, Cold Intolerance, Polydipsia, Polyphagia, Polyuria      PHYSICAL EXAM:  Vitals:    11/08/22 1438 11/08/22 1528   BP: (!) 145/78 132/74   Pulse: 72    Resp: 14    Temp: 98.2 °F (36.8 °C)    TempSrc: Oral    SpO2: 96%    Weight: 175 lb 9.6 oz (79.7 kg)    Height: 5' 5\" (1.651 m)      Body mass index is 29.22 kg/m².   Pain Score:   4    VS Reviewed  General Appearance: A&O x 3, No acute distress,well developed and well- nourished  Eyes: pupils equal, round, and reactive to light, extraocular eye movements intact, conjunctivae and eye lids without erythema  ENT: external ear and ear canal clear bilaterally, TMs intact and regular, nose without deformity, nasal mucosa and turbinates normal without polyps, oropharynx normal, dentition is normal for age  Neck: supple and non-tender without mass, no thyromegaly or thyroid nodules, no cervical lymphadenopathy  Pulmonary/Chest: distant but clear to auscultation bilaterally- no wheezes, rales or rhonchi, normal air movement, no respiratory distress or retractions  Cardiovascular: distant with S1 and S2 auscultated w/ RRR. No murmurs, rubs, clicks, or gallops, distal pulses intact. Abdomen: soft, mild+TTP epigastric pain, non-distended, bowel sounds physiologic,  no rebound or guarding, no masses or hernias noted. Liver and spleen without enlargement. Extremities: no cyanosis, clubbing or edema of the lower extremities. +2 PT/DP bilaterally. Musculoskeletal: No joint swelling or gross deformity   Skin: warm and dry. No rash or erythema. Lab Results   Component Value Date    WBC 9.3 11/01/2022    HGB 15.6 11/01/2022    HCT 48.2 (H) 11/01/2022    MCV 93.2 11/01/2022     11/01/2022     Lab Results   Component Value Date     11/01/2022    K 4.3 11/01/2022     11/01/2022    CO2 26 11/01/2022    BUN 10 11/01/2022    CREATININE 1.0 11/01/2022    GLUCOSE 108 11/01/2022    CALCIUM 9.3 11/01/2022    PROT 6.3 11/01/2022    LABALBU 4.3 11/01/2022    BILITOT 0.5 11/01/2022    ALKPHOS 100 11/01/2022    AST 18 11/01/2022    ALT 11 11/01/2022    LABGLOM >60 11/01/2022    AGRATIO 1.6 10/15/2019       Narrative   PROCEDURE: CT ABDOMEN PELVIS W IV CONTRAST       CLINICAL INFORMATION: Abdominal pain, vomiting       TECHNIQUE: CT of the abdomen and pelvis was performed following administration of 80 mL Isovue-370 intravenous contrast only. Axial images as well as coronal and sagittal reconstructions were obtained. All CT scans at this facility use dose modulation, iterative reconstruction, and/or weight-based dosing when appropriate to reduce radiation dose to as low as reasonably achievable. COMPARISON: CTA abdomen and pelvis 8/4/2022       FINDINGS:        Lower thorax: Visualized lung bases are clear. There is no pleural effusion. Abdomen: There is no free intraperitoneal air or fluid. Bowel is normal in course and caliber without evidence of obstruction. Stable exophytic hypoattenuating lesions in the bilateral kidneys are likely cysts. The liver, gallbladder, pancreas, spleen    and adrenal glands are normal. Atherosclerotic calcifications are present in the abdominal aorta. An aneurysm of the infrarenal abdominal aorta is stable again measuring 3.5 cm in diameter (image 35). There is no mesenteric or retroperitoneal    lymphadenopathy. Degenerative changes are present in the lumbar spine without evidence of aggressive osseous lesions. Pelvis: The urinary bladder is incompletely distended. There are phleboliths in the pelvis. Calcifications in the uterus are likely fibroids. There is no pelvic or inguinal lymphadenopathy. Degenerative changes are present in the pelvis without evidence    of aggressive osseous lesions. Impression   1. No acute intra-abdominal or intrapelvic findings. 2. Stable aneurysm of the infrarenal abdominal aorta. Final report electronically signed by Dr. Dennys Ballard on 10/30/2022 1:21 PM       Narrative   PROCEDURE: XR CHEST PORTABLE       CLINICAL INFORMATION: Recent cough       COMPARISON: Chest radiograph 10/30/2022       TECHNIQUE: AP portable chest radiograph performed. FINDINGS:        A loop recorder is noted. No focal pulmonary consolidation. Cardiac silhouette is not enlarged. No pleural effusion. No pneumothorax. No acute bony abnormality. Degenerative changes of the thoracic spine. Impression   1. No acute cardiopulmonary finding. **This report has been created using voice recognition software. It may contain minor errors which are inherent in voice recognition technology. **       Final report electronically signed by Dr Rigo Carrillo on 11/1/2022 12:29 PM       Narrative   PROCEDURE: CTA ABDOMEN PELVIS W CONTRAST       CLINICAL INFORMATION: Chronic epigastric pain, Chronic epigastric pain, Abnormal findings on diagnostic imaging of abdomen, Aneurysm of infrarenal abdominal aorta (HCC)       COMPARISON; routine CT abdomen and pelvis, 1/14/2022       TECHNIQUE: 1.5 mm axial images were obtained through the abdomen and pelvis before and after the administration of IV contrast material (ISOVUE). A non-contrast localizer was obtained. 3D reconstructions were performed on the scanner to include    sagittal and coronal MIP images through the  abdominal aorta and pelvic vessels. . ALL CT SCANS AT THIS FACILITY use dose modulation, iterative reconstruction, and/or weight-based dosing when appropriate to reduce radiation dose to as low as reasonably    achievable. FINDINGS:        Lung bases: Unremarkable. No infiltrates or effusions are seen. Liver/gallbladder: Unremarkable       Pancreas, spleen and adrenal glands: Unremarkable       Kidneys: Benign-appearing 2.9 cm cyst left kidney. Kidneys otherwise unremarkable. No hydronephrosis. No renal calculi. Bowel: The appendix is normal in appearance. No abnormally dilated bowel loops are seen. There are findings of constipation. No free air. No free fluid. No abnormally enlarged para-aortic lymph nodes. Bones : Moderate degenerative facet arthropathy lower lumbar spine. Mild degenerative changes both hips. No evidence for acute fracture or bone destruction. Pelvis: Urinary bladder normal in appearance. No mass lesion. No adenopathy. No free air or free fluid. There are a few small calcifications in the uterus, and mild soft tissue fullness along the anterior right side of the uterine fundus, likely related    to fibroid uterus. Vascular: Distal descending thoracic aorta unremarkable. Celiac artery and SMA widely patent. Mild stenosis at the origin right renal artery. 60-70% stenosis at the origin of the left renal artery.  Retroaortic renal vein left side. 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 The pelvic vascularity is unremarkable. Impression   1. Fibroid uterus. Findings of constipation. Benign-appearing cyst left kidney. Moderate degenerative facet disease lower lumbar spine. 2. There is a 3.5 cm fusiform aneurysm infrarenal abdominal aorta. No dissection. Celiac artery and SMA widely patent. Mild stenosis origin right renal artery. 60-70% stenosis left renal artery. **This report has been created using voice recognition software. It may contain minor errors which are inherent in voice recognition technology. **       Final report electronically signed by Dr. Madhu Dillon on 8/4/2022 11:12 AM       ASSESSMENT & PLAN  1. Chronic epigastric pain    Unclear etiology  Reassuring w/uw with GI thus far, but pain persists  They were wanting her to see pain management, which is reasonable. May be related to hx of chronic pancreatitis  Labs reviewed above as were CT scan reports and xrays  VSS  Exam reassuring    Plan:  Pt to make f/u apt with Catia's office soon to f/u  Con't pantoprazole, bentyl and zofran  Con't pepcid  Add trial of gabapentin 300mg tid  New referral to pain management  Pt considering 2nd opinion for GI down at [facility], she will update me if wants to pursue that  F/u DEC as scheduled, sooner any changes  Reviewed ER precautions, pt understands.     - gabapentin (NEURONTIN) 300 MG capsule; Take 1 capsule by mouth 3 times daily for 90 days. Dispense: 90 capsule; Refill: 2 - 2209 Kristopher Klein MD, Pain Medicine, [facility]  - pantoprazole (PROTONIX) 40 MG tablet; Take 1 tablet by mouth every morning (before breakfast)  Dispense: 90 tablet; Refill: 3  - dicyclomine (BENTYL) 10 MG capsule; Take 1 capsule by mouth 4 times daily (before meals and nightly)  Dispense: 120 capsule; Refill: 2  - ondansetron (ZOFRAN) 4 MG tablet;  Take 1 tablet by mouth every 8 hours as needed for Nausea or Vomiting 1 tablet every 6-8 hours for nausea vomiting  Dispense: 90 tablet; Refill: 0    2. Gastroesophageal reflux disease without esophagitis    - pantoprazole (PROTONIX) 40 MG tablet; Take 1 tablet by mouth every morning (before breakfast)  Dispense: 90 tablet; Refill: 3    3. Needs flu shot    - Influenza, FLUCELVAX, (age 10 mo+), IM, Preservative Free, 0.5 mL      DISPOSITION    Return if symptoms worsen or fail to improve. Antonia Cola released with restrictions. Future Appointments   Date Time Provider Kimber Jacinto   12/19/2022  9:40 AM Kenny Jiang DO [address] [facility]     PATIENT COUNSELING    Counseling was provided today regarding the following topics: Healthy eating habits, Regular exercise, substance abuse and healthy sleep habits. Barriers to learning and self management: none    Discussed use, benefit, and side effects of prescribed medications. Barriers to medication compliance addressed. All patient questions answered. Pt voiced understanding.        Electronically signed by Kenny Jiang DO on 11/8/2022 at 3:28 PM

## 2022-11-08 ENCOUNTER — OFFICE VISIT (OUTPATIENT)
Dept: FAMILY MEDICINE CLINIC | Age: 62
End: 2022-11-08
Payer: MEDICARE

## 2022-11-08 VITALS
OXYGEN SATURATION: 96 % | HEART RATE: 72 BPM | WEIGHT: 175.6 LBS | HEIGHT: 65 IN | BODY MASS INDEX: 29.26 KG/M2 | RESPIRATION RATE: 14 BRPM | TEMPERATURE: 98.2 F | DIASTOLIC BLOOD PRESSURE: 74 MMHG | SYSTOLIC BLOOD PRESSURE: 132 MMHG

## 2022-11-08 DIAGNOSIS — R10.13 CHRONIC EPIGASTRIC PAIN: Primary | ICD-10-CM

## 2022-11-08 DIAGNOSIS — Z23 NEEDS FLU SHOT: ICD-10-CM

## 2022-11-08 DIAGNOSIS — K21.9 GASTROESOPHAGEAL REFLUX DISEASE WITHOUT ESOPHAGITIS: Chronic | ICD-10-CM

## 2022-11-08 DIAGNOSIS — G89.29 CHRONIC EPIGASTRIC PAIN: Primary | ICD-10-CM

## 2022-11-08 PROCEDURE — G8482 FLU IMMUNIZE ORDER/ADMIN: HCPCS | Performed by: FAMILY MEDICINE

## 2022-11-08 PROCEDURE — 4004F PT TOBACCO SCREEN RCVD TLK: CPT | Performed by: FAMILY MEDICINE

## 2022-11-08 PROCEDURE — G8427 DOCREV CUR MEDS BY ELIG CLIN: HCPCS | Performed by: FAMILY MEDICINE

## 2022-11-08 PROCEDURE — G8417 CALC BMI ABV UP PARAM F/U: HCPCS | Performed by: FAMILY MEDICINE

## 2022-11-08 PROCEDURE — 99214 OFFICE O/P EST MOD 30 MIN: CPT | Performed by: FAMILY MEDICINE

## 2022-11-08 PROCEDURE — 3078F DIAST BP <80 MM HG: CPT | Performed by: FAMILY MEDICINE

## 2022-11-08 PROCEDURE — G0008 ADMIN INFLUENZA VIRUS VAC: HCPCS | Performed by: FAMILY MEDICINE

## 2022-11-08 PROCEDURE — 90674 CCIIV4 VAC NO PRSV 0.5 ML IM: CPT | Performed by: FAMILY MEDICINE

## 2022-11-08 PROCEDURE — 3074F SYST BP LT 130 MM HG: CPT | Performed by: FAMILY MEDICINE

## 2022-11-08 PROCEDURE — 3017F COLORECTAL CA SCREEN DOC REV: CPT | Performed by: FAMILY MEDICINE

## 2022-11-08 RX ORDER — ONDANSETRON 4 MG/1
4 TABLET, FILM COATED ORAL EVERY 8 HOURS PRN
Qty: 90 TABLET | Refills: 0 | Status: SHIPPED | OUTPATIENT
Start: 2022-11-08

## 2022-11-08 RX ORDER — PANTOPRAZOLE SODIUM 40 MG/1
40 TABLET, DELAYED RELEASE ORAL
Qty: 90 TABLET | Refills: 3 | Status: SHIPPED | OUTPATIENT
Start: 2022-11-08

## 2022-11-08 RX ORDER — GABAPENTIN 300 MG/1
300 CAPSULE ORAL 3 TIMES DAILY
Qty: 90 CAPSULE | Refills: 2 | Status: SHIPPED | OUTPATIENT
Start: 2022-11-08 | End: 2023-02-06

## 2022-11-08 RX ORDER — DICYCLOMINE HYDROCHLORIDE 10 MG/1
10 CAPSULE ORAL
Qty: 120 CAPSULE | Refills: 2 | Status: SHIPPED | OUTPATIENT
Start: 2022-11-08

## 2022-11-08 NOTE — PROGRESS NOTES
Vaccine Information Sheet, \"Influenza - Inactivated\"  given to Marvel Grissom, or parent/legal guardian of  Marvel Grissom and verbalized understanding. Patient responses:    Have you ever had a reaction to a flu vaccine? No  Do you have an allergy to eggs, neomycin or polymixin? No  Do you have an allergy to Thimerosal, contact lens solution, or Merthiolate? No  Have you ever had Guillian [place] Syndrome? No  Do you have any current illness? No  Do you have a temperature above 100 degrees? No  Are you pregnant? No  If pregnant, permission obtained from physician? No  Do you have an active neurological disorder? No      Flu vaccine given per order. Please see immunization tab.

## 2022-12-01 ENCOUNTER — TELEPHONE (OUTPATIENT)
Dept: FAMILY MEDICINE CLINIC | Age: 62
End: 2022-12-01

## 2022-12-01 DIAGNOSIS — R73.9 HYPERGLYCEMIA: ICD-10-CM

## 2022-12-01 DIAGNOSIS — R73.03 PREDIABETES: Chronic | ICD-10-CM

## 2022-12-01 DIAGNOSIS — I10 ESSENTIAL HYPERTENSION: Primary | Chronic | ICD-10-CM

## 2022-12-01 NOTE — TELEPHONE ENCOUNTER
Pt due for fasting labs prior to next apt on 12/19/2022. Please call to have pt complete this. Thanks! ASSESSMENT & PLAN   Diagnosis Orders   1. Essential hypertension  CBC with Auto Differential    Comprehensive Metabolic Panel    Hemoglobin A1C    Lipid Panel    TSH with Reflex    Microalbumin / Creatinine Urine Ratio      2. Prediabetes  CBC with Auto Differential    Comprehensive Metabolic Panel    Hemoglobin A1C    Lipid Panel    TSH with Reflex    Microalbumin / Creatinine Urine Ratio      3.  Hyperglycemia  CBC with Auto Differential    Comprehensive Metabolic Panel    Hemoglobin A1C    Lipid Panel    TSH with Reflex    Microalbumin / Creatinine Urine Ratio        Future Appointments   Date Time Provider Kimber Jacinto   12/8/2022 11:00 AM Lyndsay Bakre, APRN - CNP [facility] - [address] [facility]   12/19/2022  9:40 AM Thelma Anand DO [facility] [address]

## 2022-12-03 DIAGNOSIS — E78.5 DYSLIPIDEMIA: ICD-10-CM

## 2022-12-05 DIAGNOSIS — I69.30 HISTORY OF STROKE WITH RESIDUAL DEFICIT: ICD-10-CM

## 2022-12-05 RX ORDER — ATORVASTATIN CALCIUM 80 MG/1
TABLET, FILM COATED ORAL
Qty: 90 TABLET | Refills: 3 | Status: SHIPPED | OUTPATIENT
Start: 2022-12-05

## 2022-12-05 RX ORDER — FOLIC ACID 1 MG/1
TABLET ORAL
Qty: 90 TABLET | Refills: 3 | Status: SHIPPED | OUTPATIENT
Start: 2022-12-05

## 2022-12-05 NOTE — TELEPHONE ENCOUNTER
Recent Visits  Date Type Provider Dept   11/08/22 Office Visit Hoda Drop, DO [facility]   06/16/22 Office Visit Hoda Drop, DO [facility]   03/21/22 Office Visit Hoda Drop, DO [facility]   03/18/22 Office Visit Hoda Drop, DO [facility]   12/08/21 Office Visit Hoda Drop, DO [facility]   06/22/21 Office Visit Hoda Drop, DO [facility]   Showing recent visits within past 540 days with a meds authorizing provider and meeting all other requirements  Future Appointments  Date Type Provider Dept   12/19/22 Appointment Hoda Drop, DO [facility]   Showing future appointments within next 150 days with a meds authorizing provider and meeting all other requirements     Future Appointments   Date Time Provider Kimber Jacinto   12/19/2022  9:40 AM [facility]   12/29/2022 10:15 AM MADAI Eli CNP [facility]

## 2022-12-29 ENCOUNTER — TELEPHONE (OUTPATIENT)
Dept: PHYSICAL MEDICINE AND REHAB | Age: 62
End: 2022-12-29

## 2022-12-29 ENCOUNTER — OFFICE VISIT (OUTPATIENT)
Dept: PHYSICAL MEDICINE AND REHAB | Age: 62
End: 2022-12-29
Payer: MEDICARE

## 2022-12-29 VITALS
HEIGHT: 65 IN | BODY MASS INDEX: 28.19 KG/M2 | DIASTOLIC BLOOD PRESSURE: 60 MMHG | WEIGHT: 169.2 LBS | SYSTOLIC BLOOD PRESSURE: 120 MMHG

## 2022-12-29 DIAGNOSIS — K86.1 OTHER CHRONIC PANCREATITIS (HCC): ICD-10-CM

## 2022-12-29 DIAGNOSIS — R10.13 EPIGASTRIC PAIN: ICD-10-CM

## 2022-12-29 DIAGNOSIS — R10.9 CHRONIC ABDOMINAL PAIN: Primary | ICD-10-CM

## 2022-12-29 DIAGNOSIS — F12.288 CANNABIS HYPEREMESIS SYNDROME CONCURRENT WITH AND DUE TO CANNABIS DEPENDENCE (HCC): ICD-10-CM

## 2022-12-29 DIAGNOSIS — K21.9 GASTROESOPHAGEAL REFLUX DISEASE WITHOUT ESOPHAGITIS: ICD-10-CM

## 2022-12-29 DIAGNOSIS — G89.29 CHRONIC ABDOMINAL PAIN: Primary | ICD-10-CM

## 2022-12-29 PROCEDURE — 99205 OFFICE O/P NEW HI 60 MIN: CPT | Performed by: NURSE PRACTITIONER

## 2022-12-29 PROCEDURE — 3074F SYST BP LT 130 MM HG: CPT | Performed by: NURSE PRACTITIONER

## 2022-12-29 PROCEDURE — G8427 DOCREV CUR MEDS BY ELIG CLIN: HCPCS | Performed by: NURSE PRACTITIONER

## 2022-12-29 PROCEDURE — G8417 CALC BMI ABV UP PARAM F/U: HCPCS | Performed by: NURSE PRACTITIONER

## 2022-12-29 PROCEDURE — 3017F COLORECTAL CA SCREEN DOC REV: CPT | Performed by: NURSE PRACTITIONER

## 2022-12-29 PROCEDURE — G8482 FLU IMMUNIZE ORDER/ADMIN: HCPCS | Performed by: NURSE PRACTITIONER

## 2022-12-29 PROCEDURE — 3078F DIAST BP <80 MM HG: CPT | Performed by: NURSE PRACTITIONER

## 2022-12-29 PROCEDURE — 4004F PT TOBACCO SCREEN RCVD TLK: CPT | Performed by: NURSE PRACTITIONER

## 2022-12-29 ASSESSMENT — ENCOUNTER SYMPTOMS
WHEEZING: 1
COUGH: 1
SHORTNESS OF BREATH: 1
TROUBLE SWALLOWING: 0
NAUSEA: 1
DIARRHEA: 0
CONSTIPATION: 0
CHEST TIGHTNESS: 1
ABDOMINAL DISTENTION: 0
ABDOMINAL PAIN: 1

## 2022-12-29 NOTE — PROGRESS NOTES
[address] [facility]  [address] [facility]  Dept: [phone]  Dept Fax: [phone]: [phone]    Visit Date: 12/29/2022    Paramjit Villegas is a 57 y.o. female who is referred for pain management evaluation and treatment per Dr. Adrian Lindquist. CAGE [employer] [facility] Questions   1. In the last three months, have you felt you should cut down or stop drinking or using drugs? Yes []        No [x]     2. In the last three months, has anyone annoyed you or gotten on your nerves by telling you to cut down or stop drinking or using drugs? Yes []        No [x]     3. In the last three months, have you felt guilty or bad about how much you drink or use drugs? Yes []        No [x]     4. In the last three months, have you been waking up wanting to have an alcoholic drink or use drugs? Yes []        No [x]        Opioid Risk Tool:  Clinician Form       1. Family History of Substance Abuse: Female Male    Alcohol   []1   []3    Illegal drugs   []2   []3    Prescription drugs     []4   []4   2. Personal History of Substance Abuse:          Alcohol   []3   []3    Illegal drugs   []4   []4    Prescription drugs     []5   []5   3. Age (mell box if between 12 and 39):     []1   []1   4. History of Preadolescent Sexual Abuse:     []3   []0   5. Psychological Disease:      Attention deficit disorder, obsessive-compulsive disorder, bipolar, schizophrenia   []2   []2      Depression     []1   []1    Scoring Totals       Total Score  Low Risk  Moderate Risk  High Risk   Risk Category   0 - 3   4 - 7   8 or Above      Patient states symptoms interfere with:  A.  General Activity:  yes   B. Mood: yes    C. Walking Ability:   no   D. Normal Work (Includes both work outside the home and housework):   yes    E.  Relations with Other People:  no   F. Sleep:   no   G.  Enjoyment of Life:  yes       HPI:     Chief Complaint: Abdominal pain,  upper epigastric pain    HPI  Patient was referred her by both her pcp and GI for abdominal pain upper epigastric pain. Has a history of pancreatitis. Patient reports that she started to have abdominal pain which started last January or February of 2020 without and significant event and since then pain has been constant but intermittent intensity. States pain starts in middle of stomach epigastric area and radiates across and is tender. Pain is worse upper abdomen. States pain is constant and sharp and some stabbing and again mainly localized upper epigastric but radiates across upper abdomen. Associated symptoms are intermittent nausea, no vomiting lately in over a couple months. Has decraesed appetite. Has had  multiple ER visit for this and has followed with GI and has had extensive workup. Patient had EGD with Dr. Wayne Taveras on April 18th f abdominal pain that noted mild gastritis and duodenitis nothing that we felt was significant or would be contributing to her pain. Patient has been taking Protonix daily, Pepcid twice daily as well as Carafate, and also started on dicyclomine, and  Zofran. Patient currently is 3-4/10 but ranges anywhere from 3-10/10. No aggravating symptoms just wakes up in pain. Pain is relieved with current medications from GI she feels that dicyclomine helps the best     Treatments tried Protonix daily, Pepcid twice daily as well as Carafate, Zofran, Neurontin- but states this did not help so she is not taking it, has not tried any other pain medications or tylenol   States smokes daily [facility]. History of CVA on Plavix     Radiology:  CTA of abdomen and pelvis:   FINDINGS:        Lung bases: Unremarkable. No infiltrates or effusions are seen. Liver/gallbladder: Unremarkable       Pancreas, spleen and adrenal glands: Unremarkable       Kidneys: Benign-appearing 2.9 cm cyst left kidney. Kidneys otherwise unremarkable. No hydronephrosis.  No renal calculi. Bowel: The appendix is normal in appearance. No abnormally dilated bowel loops are seen. There are findings of constipation. No free air. No free fluid. No abnormally enlarged para-aortic lymph nodes. Bones : Moderate degenerative facet arthropathy lower lumbar spine. Mild degenerative changes both hips. No evidence for acute fracture or bone destruction. Pelvis: Urinary bladder normal in appearance. No mass lesion. No adenopathy. No free air or free fluid. There are a few small calcifications in the uterus, and mild soft tissue fullness along the anterior right side of the uterine fundus, likely related    to fibroid uterus. Vascular: Distal descending thoracic aorta unremarkable. Celiac artery and SMA widely patent. Mild stenosis at the origin right renal artery. 60-70% stenosis at the origin of the left renal artery. Retroaortic renal vein left side. 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 The pelvic vascularity is unremarkable. Impression   1. Fibroid uterus. Findings of constipation. Benign-appearing cyst left kidney. Moderate degenerative facet disease lower lumbar spine. 2. There is a 3.5 cm fusiform aneurysm infrarenal abdominal aorta. No dissection. Celiac artery and SMA widely patent. Mild stenosis origin right renal artery. 60-70% stenosis left renal artery. CT of abdomen and pelvis:   FINDINGS:        Lower thorax: Visualized lung bases are clear. There is no pleural effusion. Abdomen: There is no free intraperitoneal air or fluid. Bowel is normal in course and caliber without evidence of obstruction. Stable exophytic hypoattenuating lesions in the bilateral kidneys are likely cysts. The liver, gallbladder, pancreas, spleen    and adrenal glands are normal. Atherosclerotic calcifications are present in the abdominal aorta.  An aneurysm of the infrarenal abdominal aorta is stable again measuring 3.5 cm in diameter (image 35). There is no mesenteric or retroperitoneal    lymphadenopathy. Degenerative changes are present in the lumbar spine without evidence of aggressive osseous lesions. Pelvis: The urinary bladder is incompletely distended. There are phleboliths in the pelvis. Calcifications in the uterus are likely fibroids. There is no pelvic or inguinal lymphadenopathy. Degenerative changes are present in the pelvis without evidence    of aggressive osseous lesions. Impression   1. No acute intra-abdominal or intrapelvic findings. 2. Stable aneurysm of the infrarenal abdominal aorta. Reviewed Colonoscopy and EGD     The patient is allergic to aspirin, flagyl [metronidazole], ibuprofen, spiriva handihaler [tiotropium bromide monohydrate], and sulfa antibiotics. Subjective:      Review of Systems   Constitutional:  Positive for appetite change and unexpected weight change. Negative for activity change, chills, diaphoresis, fatigue and fever. HENT: Negative. Negative for trouble swallowing. Eyes:         Dry eyes    Respiratory:  Positive for cough, chest tightness, shortness of breath and wheezing. Tobacco use and COPD    Cardiovascular: Negative. Gastrointestinal:  Positive for abdominal pain and nausea. Negative for abdominal distention, constipation and diarrhea. Endocrine: Negative. Genitourinary: Negative. Musculoskeletal: Negative. Skin: Negative. Neurological: Negative. Psychiatric/Behavioral:  Positive for dysphoric mood. Negative for sleep disturbance. The patient is nervous/anxious. Objective:     Vitals:    12/29/22 1010   BP: 120/60   Site: Left Upper Arm   Position: Sitting   Weight: 169 lb 3.2 oz (76.7 kg)   Height: 5' 5\" (1.651 m)       Physical Exam  Constitutional:       Appearance: Normal appearance. HENT:      Head: Normocephalic and atraumatic. Mouth/Throat:      Mouth: Mucous membranes are dry.    Cardiovascular: Rate and Rhythm: Normal rate and regular rhythm. Pulses: Normal pulses. Heart sounds: Normal heart sounds. Pulmonary:      Effort: Pulmonary effort is normal.      Breath sounds: Wheezing present. Abdominal:      General: Abdomen is flat. Bowel sounds are normal. There is no distension. Palpations: Abdomen is soft. There is no shifting dullness or hepatomegaly. Tenderness: There is abdominal tenderness in the right upper quadrant, epigastric area and left upper quadrant. There is guarding. Musculoskeletal:         General: Normal range of motion. Cervical back: Normal range of motion and neck supple. No tenderness, bony tenderness or crepitus. No pain with movement. Thoracic back: No tenderness or bony tenderness. Lumbar back: Normal. No tenderness or bony tenderness. Negative right straight leg raise test and negative left straight leg raise test.   Neurological:      General: No focal deficit present. Mental Status: She is alert and oriented to person, place, and time. Motor: No weakness. Psychiatric:         Mood and Affect: Mood normal.            Assessment:     1. Chronic abdominal pain    2. Epigastric pain    3. Other chronic pancreatitis ([address]    4. Gastroesophageal reflux disease without esophagitis    5. Cannabis hyperemesis syndrome concurrent with and due to cannabis dependence [facility])            Plan:      Patient read and signed orientation and opioid agreement. OARRS reviewed. Current MED: 0  Patient was not offered naloxone for home. Discussed long term side effects of medications, tolerance, dependency and addiction. UDS preformed today. Patient told can not receive any pain medications from any other source. No evidence of abuse, diversion or aberrant behavior.   Medications and/or procedures to improve function and quality of life- patient understanding with this and that may not be pain free  Discussed possible weaning of medication dosing dependent on treatment/procedure results. Discussed with patient about safe storage of medications at home  Reviewed pcp notes in detail, reviewed GI notes in detail  Reviewed abdominal CT and CT of abdomen and pelvis, results from EGD and colonoscopy. Discussed will try a celiac plexus nerve block in ASC with Dr. Sammi Jerome. Procedure discussed in detail. On aspirin and Plavix- needs clearance from pcp prior to procedure to hold   Education provided not too much to offer from our standpoint for this pain and if no relief from this block encouraged her to continue to follow up with GI and and she may need to see a tertiary care facility  Continue medications recommended and prescribed by GI team.   Uses THC long term and daily (could this possibly be cannabis hyperemesis syndrome)          Meds. Prescribed:   No orders of the defined types were placed in this encounter. Return for  celiac plexus nerve block in ASC. , follow up after procedure.          Electronically signed by MADAI Cueva CNP on 12/29/2022 at 1:47 PM

## 2023-01-29 NOTE — PROGRESS NOTES
Chief Complaint   Patient presents with    Medicare AW    Weight Loss           Follow-up     Epigastric pain, is better  F/u chronic issues noted below       History obtained from the patient. SUBJECTIVE:  Shannon Lyons is a 57 y.o. female that presents today for     -ER f/u for abdominal pain LAST VISIT:  Chronic epigastric abd pain  Started in March of this year  Referred to GI  No obvious cause despite EGD, colo CT abd/pelvis and CTA abdomen  Sxs had actually improved some in Aug and SEPT  But late OCT sxs returned  Last saw Kassidy Pepe, end of July 2022  At that point was referred to pain management, but she never heard back from them  Having inc pain in epigastrium again  4-7/10  Epigastric  Does not radiate  Not related to food  Is taking pantoprazole and pepcid  Some nausea  No vomiting  No blood in stool  No melena  Hasn't seen GI back since sxs returned  Stopped carafate since not helpful  Bentyl and zofran helping some  Wts stable    UPDATE TODAY:   Abd pain sig improved  Neg w/u with GI, including EGD, CT abd pelvis and CTA abd pelvis  Was being setup for nerve block with pain management  But bentyl added and sxs sig improved  Does have chronic pancreatitis that she see's Catia for   No diarrhea  Did have wt loss in the last 4 wks, about 10 lbs  States she feels fine  No fevers, chills or night sweats  Appetite good  No early satiety  No dysphagia  No hemoptysis  No SOB  Due for LDCT/VAZQUEZ      -Depression PRIOR VISIT:  Inc depression  On wellbutrin  Not helping  Not sleeping well  +ahedonia  Inc feelings of guilt.    No SI/HI     UPDATE PRIOR VISIT:   Moods much better  lexapro working well  Still having trouble falling and staying asleep  No SI/HI      UPDATE TODAY:   Moods stable  lexapro working well and trazodone  No SI/HI       -COPD:     HPI:  Breathing stable  No cough, wheezing or SOB  No change in meds  Declines PFTs at this time     Current medication regimen - see below  Compliant with medications? yes     Limitations in function - none  Does patient smoke?  occasional     Chronic cough?: no  Chest pain/Tightness?:  no  Shortness of breath?: no  Wheezing?  no     Last PFTs - declines today  # Exacerbations in the last year: 0     Known triggers? Yes  Hospitalized and/or intubated in the past?: No  Number of times prescribed oral steroids in the past year - 0  Influenza vaccine up to date?  declines  Pneumococcal vaccine up to date? Yes      -Hx of CVA:  On plavix and statin  No recurrent sxs  Followed with neuro  Told can now f/u prn        -HTN:     HPI:     Taking meds as prescribed ?: yes  Tolerating well ?: yes  Side Effects ?: denies  BP at home ?: <140/90  Working on TLCS ?: yes  Chest Pain/SOB/Palpitations? denies    BP Readings from Last 3 Encounters:   23 130/80   22 120/60   22 132/74       -HLD:     HPI:     Taking meds as prescribed ?: yes  Tolerating well ?: denies  Side Effects ?: denies  Muscle Pain?: denies  Working on TLCS ?: yes        -AAA: stable, was following with [facility], but last saw in 2019 and doesn't want to f/u with them. I ordered f/u US and was stable 2022 with plans to repeat in 2 years. Also had CTA abdomen AUG 2022 for unrelated reasons via GI, that was also stable. Denies lower abd pain.        -Obesity: working on wt loss, wts stable. Diet ok.  Not exercising        -PreDM:  Due for labs/       -Smokin pack q 4 days  Working on quitting  Declines formal intervention      Age/Gender Health Maintenance    Lipid -   Lab Results   Component Value Date    CHOL 149 2022    CHOL 137 2021    CHOL 161 2020     Lab Results   Component Value Date    TRIG 143 2022    TRIG 112 2021    TRIG 154 2020     Lab Results   Component Value Date    HDL 46 2022    HDL 48 2021    HDL 54 2020     Lab Results   Component Value Date    LDLCALC 74 2022    LDLCALC 67 2021    LDLCALC 76 11/05/2020       DM Screen - 101 fasting, (June 2018)  Lab Results   Component Value Date/Time    GLUCOSE 108 11/01/2022 11:15 AM    GLUCOSE 143 10/15/2019 02:00 PM     Lab Results   Component Value Date/Time    LABA1C 5.8 06/06/2022 09:53 AM    LABA1C 5.8 12/06/2021 09:17 AM    LABA1C 5.6 06/15/2021 09:40 AM    LABA1C 6.1 11/05/2020 08:17 AM    LABA1C 6.3 01/22/2019 08:44 AM    LABA1C 5.6 05/15/2017 11:30 AM       Colon Cancer Screening - + sessile T/a FEB 2019, repeat 2 years per GI, [facility]/+ T/A July 2022, repeat 5 years per GI, [facility]  Lung Cancer Screening - NEG JAN 2022    Tetanus - to get at pharmacy per medicare rules  Influenza Vaccine - UTD NOV 2022  Pneumonia Vaccine - UTD PPV 23 APR 2014  Zoster - to get at pharmacy per medicare rules    Breast Cancer Screening - NEG JAN 2021, due, ordered June 2022 and JAN 2023  Cervical Cancer Screening - NEG FEB 2019   Osteoporosis Screening - age 70      Specialist List  Neuro: Yayo  GI: Lawerance Stall: [facility]  Cardio: [facility]  [facility]: [facility]      Current Outpatient Medications   Medication Sig Dispense Refill    dicyclomine (BENTYL) 10 MG capsule take 1 capsule by mouth four times a day before meals and at bedtime 120 capsule 2    atorvastatin (LIPITOR) 80 MG tablet take 1 tablet by mouth once daily at bedtime 90 tablet 3    folic acid (FOLVITE) 1 MG tablet take 1 tablet by mouth once daily 90 tablet 3    gabapentin (NEURONTIN) 300 MG capsule Take 1 capsule by mouth 3 times daily for 90 days.  90 capsule 2    pantoprazole (PROTONIX) 40 MG tablet Take 1 tablet by mouth every morning (before breakfast) 90 tablet 3    ondansetron (ZOFRAN) 4 MG tablet Take 1 tablet by mouth every 8 hours as needed for Nausea or Vomiting 1 tablet every 6-8 hours for nausea vomiting 90 tablet 0    clopidogrel (PLAVIX) 75 MG tablet take 1 tablet by mouth once daily 90 tablet 3    albuterol sulfate HFA (VENTOLIN HFA) 108 (90 Base) MCG/ACT inhaler Inhale 2 puffs into the lungs 4 times daily as needed for Wheezing or Shortness of Breath 54 g 0    famotidine (PEPCID) 20 MG tablet take 1 tablet by mouth twice a day 180 tablet 3    traZODone (DESYREL) 50 MG tablet take 1 tablet by mouth nightly 90 tablet 3    escitalopram (LEXAPRO) 10 MG tablet take 1 tablet by mouth daily 90 tablet 3    amLODIPine (NORVASC) 10 MG tablet take 1 tablet by mouth once daily 90 tablet 3    salmeterol (SEREVENT DISKUS) 50 MCG/DOSE diskus inhaler Inhale 1 puff into the lungs 2 times daily 1 each 3    acetaminophen (TYLENOL) 325 MG tablet Take 650-975 mg by mouth every 6 hours as needed for Pain       No current facility-administered medications for this visit. No orders of the defined types were placed in this encounter. All medications reviewed and reconciled, including OTC and herbal medications. Updated list given to patient. Patient Active Problem List    Diagnosis Date Noted    Major depression     Prediabetes     Chronic bilateral low back pain with left-sided sciatica 06/12/2018    GERD (gastroesophageal reflux disease)     Former smoker     Osteoarthritis     Dyslipidemia 05/19/2017    History of stroke with residual deficit; cryptogenic 05/15/2017     Followed with neuro at [facility], last note states can f/u prn.  Also follows with cardio at [facility]        Obesity (BMI 30-39.9) 05/08/2014    Essential hypertension 05/08/2014    AAA (abdominal aortic aneurysm) 04/18/2014    COPD (chronic obstructive pulmonary disease) (HCC)        Past Medical History:   Diagnosis Date    AAA (abdominal aortic aneurysm) 04/18/2014    Follows with [facility]    Chronic pancreatitis [facility])     COPD (chronic obstructive pulmonary disease) ([facility] [address]     Dyslipidemia     Essential hypertension 05/08/2014    Former smoker     High cholesterol     History of colon polyps     History of stroke with residual deficit; cryptogenic 05/2017    Follows with neuro and cardio at [facility]    Osteoarthritis        Past Surgical History:   Procedure Laterality Date    ABDOMEN SURGERY      c sections x3    CARPAL TUNNEL RELEASE Bilateral      SECTION      x3    COLONOSCOPY      Dr. Papi Griffith    EGD  [id number]    INSERTABLE CARDIAC MONITOR      TONSILLECTOMY      as child    UPPER GASTROINTESTINAL ENDOSCOPY         Allergies   Allergen Reactions    Aspirin      Upset stomach    Flagyl [Metronidazole] Other (See Comments)     abd pain    Ibuprofen      Stomach pain    Spiriva Handihaler [Tiotropium Bromide Monohydrate] Other (See Comments)     Upper chest pain     Sulfa Antibiotics Other (See Comments)     PT WAS KID DOESNT KNOW       Social History     Tobacco Use    Smoking status: Every Day     Packs/day: 1.00     Years: 45.00     Pack years: 45.00     Types: Cigarettes    Smokeless tobacco: Never   Substance Use Topics    Alcohol use: No     Alcohol/week: 0.0 standard drinks       Family History   Problem Relation Age of Onset    Diabetes Maternal Grandmother     Diabetes Mother     Heart Disease Mother     Cancer Father         Something in the chest, pt not sure what    Heart Disease Father     COPD Father     Diabetes Maternal Aunt     High Blood Pressure Brother     Arthritis Neg Hx     Asthma Neg Hx     Birth Defects Neg Hx     Depression Neg Hx     Early Death Neg Hx     Hearing Loss Neg Hx     High Cholesterol Neg Hx     Kidney Disease Neg Hx     Learning Disabilities Neg Hx     Mental Illness Neg Hx     Mental Retardation Neg Hx     Miscarriages / Stillbirths Neg Hx     Stroke Neg Hx     Substance Abuse Neg Hx     Vision Loss Neg Hx     Breast Cancer Neg Hx     Colon Cancer Neg Hx          I have reviewed the patient's past medical history, past surgical history, allergies, medications, social and family history and I have made updates where appropriate.       Review of Systems  Positive responses are highlighted in bold    Constitutional:  Fever, Chills, Night Sweats, Fatigue, Unexpected changes in weight  HENT:  Ear pain, Tinnitus, Nosebleeds, Trouble swallowing, Hearing loss, Sore throat  Cardiovascular:  Chest Pain, Palpitations, Orthopnea, Paroxysmal Nocturnal Dyspnea  Respiratory:  Cough, Wheezing, Shortness of breath, Chest tightness, Apnea  Gastrointestinal:  Nausea, Vomiting, Diarrhea, Constipation, Heartburn, Blood in stool  Genitourinary:  Difficulty or painful urination, Flank pain, Change in frequency, Urgency  Skin:  Color change, Rash, Itching, Wound  Musculoskeletal:  Joint pain, Back pain, Gait problems, Joint swelling, Myalgias  Neurological:  Dizziness, Headaches, Presyncope, Numbness, Seizures, Tremors  Endocrine:  Heat Intolerance, Cold Intolerance, Polydipsia, Polyphagia, Polyuria      PHYSICAL EXAM:  Vitals:    01/30/23 1257   BP: 130/80   Pulse: 80   Resp: 18   Temp: 97.8 °F (36.6 °C)   TempSrc: Oral   SpO2: 98%   Weight: 164 lb (74.4 kg)   Height: 5' 5\" (1.651 m)     Body mass index is 27.29 kg/m². Wt Readings from Last 3 Encounters:   01/30/23 164 lb (74.4 kg)   12/29/22 169 lb 3.2 oz (76.7 kg)   11/08/22 175 lb 9.6 oz (79.7 kg)       VS Reviewed  General Appearance: A&O x 3, No acute distress,well developed and well- nourished  Eyes: pupils equal, round, and reactive to light, extraocular eye movements intact, conjunctivae and eye lids without erythema  ENT: external ear and ear canal clear bilaterally, TMs intact and regular, nose without deformity, nasal mucosa and turbinates normal without polyps, oropharynx normal, dentition is normal for age  Neck: supple and non-tender without mass, no thyromegaly or thyroid nodules, no cervical lymphadenopathy  Pulmonary/Chest: distant but clear to auscultation bilaterally- no wheezes, rales or rhonchi, normal air movement, no respiratory distress or retractions  Cardiovascular: distant with S1 and S2 auscultated w/ RRR. No murmurs, rubs, clicks, or gallops, distal pulses intact.   Abdomen: soft, no TTP, non-distended, bowel sounds physiologic,  no rebound or guarding, no masses or hernias noted. Liver and spleen without enlargement. Extremities: no cyanosis, clubbing or edema of the lower extremities. +2 PT/DP bilaterally. Musculoskeletal: No joint swelling or gross deformity   Skin: warm and dry. No rash or erythema. Psych: Affect appropriate. Mood euthymic. Thought process is normal without evidence of depression or psychosis. Good insight and appropriate interaction. Cognition and memory appear to be intact. Lab Results   Component Value Date    WBC 9.3 11/01/2022    HGB 15.6 11/01/2022    HCT 48.2 (H) 11/01/2022    MCV 93.2 11/01/2022     11/01/2022     Lab Results   Component Value Date     11/01/2022    K 4.3 11/01/2022     11/01/2022    CO2 26 11/01/2022    BUN 10 11/01/2022    CREATININE 1.0 11/01/2022    GLUCOSE 108 11/01/2022    CALCIUM 9.3 11/01/2022    PROT 6.3 11/01/2022    LABALBU 4.3 11/01/2022    BILITOT 0.5 11/01/2022    ALKPHOS 100 11/01/2022    AST 18 11/01/2022    ALT 11 11/01/2022    LABGLOM >60 11/01/2022    AGRATIO 1.6 10/15/2019     Lab Results   Component Value Date    TSH 2.160 12/06/2021       Narrative   PROCEDURE: CT ABDOMEN PELVIS W IV CONTRAST       CLINICAL INFORMATION: Abdominal pain, vomiting       TECHNIQUE: CT of the abdomen and pelvis was performed following administration of 80 mL Isovue-370 intravenous contrast only. Axial images as well as coronal and sagittal reconstructions were obtained. All CT scans at this facility use dose modulation, iterative reconstruction, and/or weight-based dosing when appropriate to reduce radiation dose to as low as reasonably achievable. COMPARISON: CTA abdomen and pelvis 8/4/2022       FINDINGS:        Lower thorax: Visualized lung bases are clear. There is no pleural effusion. Abdomen: There is no free intraperitoneal air or fluid. Bowel is normal in course and caliber without evidence of obstruction.  Stable exophytic hypoattenuating lesions in the bilateral kidneys are likely cysts. The liver, gallbladder, pancreas, spleen    and adrenal glands are normal. Atherosclerotic calcifications are present in the abdominal aorta. An aneurysm of the infrarenal abdominal aorta is stable again measuring 3.5 cm in diameter (image 35). There is no mesenteric or retroperitoneal    lymphadenopathy. Degenerative changes are present in the lumbar spine without evidence of aggressive osseous lesions. Pelvis: The urinary bladder is incompletely distended. There are phleboliths in the pelvis. Calcifications in the uterus are likely fibroids. There is no pelvic or inguinal lymphadenopathy. Degenerative changes are present in the pelvis without evidence    of aggressive osseous lesions. Impression   1. No acute intra-abdominal or intrapelvic findings. 2. Stable aneurysm of the infrarenal abdominal aorta. Final report electronically signed by Dr. Cecelia Gaytan on 10/30/2022 1:21 PM       ASSESSMENT & PLAN  1. Weight loss, unintentional    Unclear etiology  Recent labs ok  Recent CT abd/pelvis ok  Neg w/u for epigastric pain    Plan:  Start with labs below  Get updated VAZQUEZ/LDCT, though no breast or lung sxs today  F/u 4 wks  Reassess wt  Further w/u based on labs, wts and any new sxs    - CBC with Auto Differential; Future  - Comprehensive Metabolic Panel; Future  - Lipid Panel; Future  - TSH with Reflex; Future  - Microalbumin / Creatinine Urine Ratio; Future  - Hemoglobin A1C; Future  - Sedimentation Rate; Future  - C-Reactive Protein; Future  - Hepatitis C Antibody; Future  - HIV Screen; Future    2. Chronic epigastric pain    Doing better with addition of Bentyl  Unclear etiology  ? Related to chronic pancreatitis  Con't bentyl and pantoprazole  Ok to hold on nerve block if pain better  F/u GI and pain if sxs return    3. Chronic pancreatitis, unspecified pancreatitis type ([address]    Stable from physio stand point  F/u GI    4.  Gastroesophageal reflux disease without esophagitis    Con't pantoprazole. 5. Recurrent major depressive disorder, in full remission ([facility] [address]    Stable  con't lexapro at present dose  con't trazodone for insomnia, working well    6. Insomnia, unspecified type    As above    7. Chronic obstructive pulmonary disease, unspecified COPD type ([facility] [address]    Stable  con't meds, prn alb and serevent  Declines PFT order today  Discuss again next visit    8. History of stroke with residual deficit    Stable  Con't tertiary prevention    9. Essential hypertension    At goal  con't norvasc  Labs UTD    - CBC with Auto Differential; Future  - Comprehensive Metabolic Panel; Future  - Lipid Panel; Future  - TSH with Reflex; Future  - Microalbumin / Creatinine Urine Ratio; Future  - Hemoglobin A1C; Future  - Sedimentation Rate; Future  - C-Reactive Protein; Future  - Hepatitis C Antibody; Future  - HIV Screen; Future    10. Dyslipidemia    Stable  con't lipitor    - CBC with Auto Differential; Future  - Comprehensive Metabolic Panel; Future  - Lipid Panel; Future  - TSH with Reflex; Future  - Microalbumin / Creatinine Urine Ratio; Future  - Hemoglobin A1C; Future  - Sedimentation Rate; Future  - C-Reactive Protein; Future  - Hepatitis C Antibody; Future  - HIV Screen; Future    11. Abdominal aortic aneurysm (AAA) without rupture, unspecified part    Stable  con't RF  Repeat US VS CTA AUG 2024  If stable, con't to monitor  If changing, get set back up with vascular    - CBC with Auto Differential; Future  - Comprehensive Metabolic Panel; Future  - Lipid Panel; Future  - TSH with Reflex; Future  - Microalbumin / Creatinine Urine Ratio; Future  - Hemoglobin A1C; Future  - Sedimentation Rate; Future  - C-Reactive Protein; Future  - Hepatitis C Antibody; Future  - HIV Screen; Future    12. Obesity (BMI 30-39. 9)    W/u wt loss as above    13. Prediabetes    Con't TLC  Labs ordered    - Hemoglobin A1C; Future    14. Hyperglycemia    - Hemoglobin A1C; Future    15. Cigarette nicotine dependence without complication    In precontemplation stage and not ready to quit. Declines cessation, aware of risks of continued smoking as well as resources available to help quit. These include tobacco cessation classes as well as 1-800-QUIT NOW. No barriers other than lack of desire. 3+ min spent counseling.     - ME VISIT TO DISCUSS LUNG CA SCREEN W LDCT  - CT Lung Screen (Annual/Baseline); Future    16. Encounter for screening mammogram for malignant neoplasm of breast    - VAZQUEZ JAKE DIGITAL SCREEN BILATERAL; Future      DISPOSITION    Return in about 4 weeks (around 2/27/2023) for f/u weight loss, sooner as needed. Irish Martin released with restrictions. No future appointments. PATIENT COUNSELING    Counseling was provided today regarding the following topics: Healthy eating habits, Regular exercise, substance abuse and healthy sleep habits. Barriers to learning and self management: none    Discussed use, benefit, and side effects of prescribed medications. Barriers to medication compliance addressed. All patient questions answered. Pt voiced understanding. Discussed with the patient the current USPSTF guidelines released March 9, 2021 for screening for lung cancer. For adults aged 48 to [de-identified] years who have a 20 pack-year smoking history and currently smoke or have quit within the past 15 years the grade B recommendation is to:  Screen for lung cancer with low-dose computed tomography (LDCT) every year. Stop screening once a person has not smoked for 15 years or has a health problem that limits life expectancy or the ability to have lung surgery. The patient  reports that she has been smoking cigarettes. She has a 45.00 pack-year smoking history. She has never used smokeless tobacco.. Discussed with patient the risks and benefits of screening, including over-diagnosis, false positive rate, and total radiation exposure.   The patient currently exhibits no signs or symptoms suggestive of lung cancer. Discussed with patient the importance of compliance with yearly annual lung cancer screenings and willingness to undergo diagnosis and treatment if screening scan is positive. In addition, the patient was counseled regarding the importance of remaining smoke free and/or total smoking cessation. Also reviewed the following if the patient has Medicare that as of February 10, 2022, Medicare only covers LDCT screening in patients aged 51-72 with at least a 20 pack-year smoking history who currently smoke or have quit in the last 15 years      Electronically signed by Anuradha Farmer DO on 1/30/2023 at 1:22 PM        Medicare Annual Wellness Visit    Dio Campa is here for Medicare AWV, Weight Loss (/), and Follow-up (Epigastric pain, is better/F/u chronic issues noted below)    Assessment & Plan   Medicare annual wellness visit, subsequent      Recommendations for Preventive Services Due: see orders and patient instructions/AVS.  Recommended screening schedule for the next 5-10 years is provided to the patient in written form: see Patient Instructions/AVS.     Return in about 4 weeks (around 2/27/2023) for f/u weight loss, sooner as needed. Subjective     Patient's complete Health Risk Assessment and screening values have been reviewed and are found in Flowsheets. The following problems were reviewed today and where indicated follow up appointments were made and/or referrals ordered.     Positive Risk Factor Screenings with Interventions:          Drug Use:          Interpretation:  1-2: Low level - Monitor, re-assess at a later date  3-5: Moderate level - Further Investigation  6-8: Substantial level - Intensive Assessment  9-10: Severe level - Intensive Assessment    Interventions:  See AVS for addional education material          Weight and Activity:  Physical Activity: Inactive    Days of Exercise per Week: 0 days    Minutes of Exercise per Session: 0 min     On average, how many days per week do you engage in moderate to strenuous exercise (like a brisk walk)?: 0 days  Have you lost any weight without trying in the past 3 months?: No  Body mass index: (!) 27.29      Inactivity Interventions:  See AVS for additional education material            Tobacco Use:  Tobacco Use: High Risk    Smoking Tobacco Use: Every Day    Smokeless Tobacco Use: Never    Passive Exposure: Not on file     E-cigarette/Vaping       Questions Responses    E-cigarette/Vaping Use Never User    Start Date     Passive Exposure     Quit Date     Counseling Given     Comments           Interventions:  See AVS for addional education material            Objective   Vitals:    01/30/23 1257   BP: 130/80   Pulse: 80   Resp: 18   Temp: 97.8 °F (36.6 °C)   TempSrc: Oral   SpO2: 98%   Weight: 164 lb (74.4 kg)   Height: 5' 5\" (1.651 m)      Body mass index is 27.29 kg/m². Allergies   Allergen Reactions    Aspirin      Upset stomach    Flagyl [Metronidazole] Other (See Comments)     abd pain    Ibuprofen      Stomach pain    Spiriva Handihaler [Tiotropium Bromide Monohydrate] Other (See Comments)     Upper chest pain     Sulfa Antibiotics Other (See Comments)     PT WAS KID DOESNT KNOW     Prior to Visit Medications    Medication Sig Taking? Authorizing Provider   dicyclomine (BENTYL) 10 MG capsule take 1 capsule by mouth four times a day before meals and at bedtime Yes Minus Gearing, DO   atorvastatin (LIPITOR) 80 MG tablet take 1 tablet by mouth once daily at bedtime Yes Minus Gearing, DO   folic acid (FOLVITE) 1 MG tablet take 1 tablet by mouth once daily Yes Minus Gearing, DO   gabapentin (NEURONTIN) 300 MG capsule Take 1 capsule by mouth 3 times daily for 90 days.  Yes Minus Gearing, DO   pantoprazole (PROTONIX) 40 MG tablet Take 1 tablet by mouth every morning (before breakfast) Yes Amador Miles, DO   ondansetron (ZOFRAN) 4 MG tablet Take 1 tablet by mouth every 8 hours as needed for Nausea or Vomiting 1 tablet every 6-8 hours for nausea vomiting Yes Jillian Lyons DO   clopidogrel (PLAVIX) 75 MG tablet take 1 tablet by mouth once daily Yes Amador Miles DO   albuterol sulfate HFA (VENTOLIN HFA) 108 (90 Base) MCG/ACT inhaler Inhale 2 puffs into the lungs 4 times daily as needed for Wheezing or Shortness of Breath Yes MADAI Marc CNP   famotidine (PEPCID) 20 MG tablet take 1 tablet by mouth twice a day Yes MADAI Motta CNP   traZODone (DESYREL) 50 MG tablet take 1 tablet by mouth nightly Yes Amador Miles DO   escitalopram (LEXAPRO) 10 MG tablet take 1 tablet by mouth daily Yes Amador Miles DO   amLODIPine (NORVASC) 10 MG tablet take 1 tablet by mouth once daily Yes Amador Miles DO   salmeterol (SEREVENT DISKUS) 50 MCG/DOSE diskus inhaler Inhale 1 puff into the lungs 2 times daily Yes Jillian Lyons DO   acetaminophen (TYLENOL) 325 MG tablet Take 650-975 mg by mouth every 6 hours as needed for Pain Yes Historical Provider, MD Victor (Including outside providers/suppliers regularly involved in providing care):   Patient Care Team:  Jillian Lyons DO as PCP - General (Family Medicine)  Jillian Lyons DO as PCP - [facility] Empaneled Provider  Nany Hernandez MD as Consulting Physician (Pulmonology)  Fahad Stark RN as Nurse Navigator     Reviewed and updated this visit:  Tobacco  Allergies  Meds  Problems  Med Hx  Surg Hx  Soc Hx  Fam Hx             Care plan reviewed with and given to patient.        Electronically signed by Jillian Lyons DO on 1/30/2023 at 1:22 PM

## 2023-01-30 ENCOUNTER — OFFICE VISIT (OUTPATIENT)
Dept: FAMILY MEDICINE CLINIC | Age: 63
End: 2023-01-30
Payer: MEDICARE

## 2023-01-30 VITALS
RESPIRATION RATE: 18 BRPM | DIASTOLIC BLOOD PRESSURE: 80 MMHG | BODY MASS INDEX: 27.32 KG/M2 | WEIGHT: 164 LBS | OXYGEN SATURATION: 98 % | TEMPERATURE: 97.8 F | HEIGHT: 65 IN | SYSTOLIC BLOOD PRESSURE: 130 MMHG | HEART RATE: 80 BPM

## 2023-01-30 DIAGNOSIS — R73.03 PREDIABETES: ICD-10-CM

## 2023-01-30 DIAGNOSIS — E78.5 DYSLIPIDEMIA: ICD-10-CM

## 2023-01-30 DIAGNOSIS — J44.9 CHRONIC OBSTRUCTIVE PULMONARY DISEASE, UNSPECIFIED COPD TYPE (HCC): ICD-10-CM

## 2023-01-30 DIAGNOSIS — Z12.31 ENCOUNTER FOR SCREENING MAMMOGRAM FOR MALIGNANT NEOPLASM OF BREAST: ICD-10-CM

## 2023-01-30 DIAGNOSIS — R10.13 CHRONIC EPIGASTRIC PAIN: ICD-10-CM

## 2023-01-30 DIAGNOSIS — K86.1 CHRONIC PANCREATITIS, UNSPECIFIED PANCREATITIS TYPE (HCC): ICD-10-CM

## 2023-01-30 DIAGNOSIS — G47.00 INSOMNIA, UNSPECIFIED TYPE: ICD-10-CM

## 2023-01-30 DIAGNOSIS — I69.30 HISTORY OF STROKE WITH RESIDUAL DEFICIT: ICD-10-CM

## 2023-01-30 DIAGNOSIS — G89.29 CHRONIC EPIGASTRIC PAIN: ICD-10-CM

## 2023-01-30 DIAGNOSIS — I71.40 ABDOMINAL AORTIC ANEURYSM (AAA) WITHOUT RUPTURE, UNSPECIFIED PART: ICD-10-CM

## 2023-01-30 DIAGNOSIS — F17.210 CIGARETTE NICOTINE DEPENDENCE WITHOUT COMPLICATION: ICD-10-CM

## 2023-01-30 DIAGNOSIS — R73.9 HYPERGLYCEMIA: ICD-10-CM

## 2023-01-30 DIAGNOSIS — R63.4 WEIGHT LOSS, UNINTENTIONAL: ICD-10-CM

## 2023-01-30 DIAGNOSIS — K21.9 GASTROESOPHAGEAL REFLUX DISEASE WITHOUT ESOPHAGITIS: ICD-10-CM

## 2023-01-30 DIAGNOSIS — Z00.00 MEDICARE ANNUAL WELLNESS VISIT, SUBSEQUENT: Primary | ICD-10-CM

## 2023-01-30 DIAGNOSIS — E66.9 OBESITY (BMI 30-39.9): ICD-10-CM

## 2023-01-30 DIAGNOSIS — F33.42 RECURRENT MAJOR DEPRESSIVE DISORDER, IN FULL REMISSION (HCC): ICD-10-CM

## 2023-01-30 DIAGNOSIS — I10 ESSENTIAL HYPERTENSION: ICD-10-CM

## 2023-01-30 PROCEDURE — 3079F DIAST BP 80-89 MM HG: CPT | Performed by: FAMILY MEDICINE

## 2023-01-30 PROCEDURE — 3017F COLORECTAL CA SCREEN DOC REV: CPT | Performed by: FAMILY MEDICINE

## 2023-01-30 PROCEDURE — 4004F PT TOBACCO SCREEN RCVD TLK: CPT | Performed by: FAMILY MEDICINE

## 2023-01-30 PROCEDURE — G0439 PPPS, SUBSEQ VISIT: HCPCS | Performed by: FAMILY MEDICINE

## 2023-01-30 PROCEDURE — G8482 FLU IMMUNIZE ORDER/ADMIN: HCPCS | Performed by: FAMILY MEDICINE

## 2023-01-30 PROCEDURE — G8417 CALC BMI ABV UP PARAM F/U: HCPCS | Performed by: FAMILY MEDICINE

## 2023-01-30 PROCEDURE — 3023F SPIROM DOC REV: CPT | Performed by: FAMILY MEDICINE

## 2023-01-30 PROCEDURE — 3075F SYST BP GE 130 - 139MM HG: CPT | Performed by: FAMILY MEDICINE

## 2023-01-30 PROCEDURE — G8427 DOCREV CUR MEDS BY ELIG CLIN: HCPCS | Performed by: FAMILY MEDICINE

## 2023-01-30 PROCEDURE — G0296 VISIT TO DETERM LDCT ELIG: HCPCS | Performed by: FAMILY MEDICINE

## 2023-01-30 PROCEDURE — 99214 OFFICE O/P EST MOD 30 MIN: CPT | Performed by: FAMILY MEDICINE

## 2023-01-30 RX ORDER — DICYCLOMINE HYDROCHLORIDE 10 MG/1
CAPSULE ORAL
Qty: 120 CAPSULE | Refills: 2 | Status: SHIPPED | OUTPATIENT
Start: 2023-01-30

## 2023-01-30 SDOH — ECONOMIC STABILITY: FOOD INSECURITY: WITHIN THE PAST 12 MONTHS, THE FOOD YOU BOUGHT JUST DIDN'T LAST AND YOU DIDN'T HAVE MONEY TO GET MORE.: NEVER TRUE

## 2023-01-30 SDOH — ECONOMIC STABILITY: FOOD INSECURITY: WITHIN THE PAST 12 MONTHS, YOU WORRIED THAT YOUR FOOD WOULD RUN OUT BEFORE YOU GOT MONEY TO BUY MORE.: NEVER TRUE

## 2023-01-30 ASSESSMENT — PATIENT HEALTH QUESTIONNAIRE - PHQ9
SUM OF ALL RESPONSES TO PHQ QUESTIONS 1-9: 1
3. TROUBLE FALLING OR STAYING ASLEEP: 0
SUM OF ALL RESPONSES TO PHQ QUESTIONS 1-9: 1
2. FEELING DOWN, DEPRESSED OR HOPELESS: 0
7. TROUBLE CONCENTRATING ON THINGS, SUCH AS READING THE NEWSPAPER OR WATCHING TELEVISION: 0
6. FEELING BAD ABOUT YOURSELF - OR THAT YOU ARE A FAILURE OR HAVE LET YOURSELF OR YOUR FAMILY DOWN: 0
SUM OF ALL RESPONSES TO PHQ9 QUESTIONS 1 & 2: 0
SUM OF ALL RESPONSES TO PHQ QUESTIONS 1-9: 1
9. THOUGHTS THAT YOU WOULD BE BETTER OFF DEAD, OR OF HURTING YOURSELF: 0
1. LITTLE INTEREST OR PLEASURE IN DOING THINGS: 0
5. POOR APPETITE OR OVEREATING: 0
4. FEELING TIRED OR HAVING LITTLE ENERGY: 1
8. MOVING OR SPEAKING SO SLOWLY THAT OTHER PEOPLE COULD HAVE NOTICED. OR THE OPPOSITE, BEING SO FIGETY OR RESTLESS THAT YOU HAVE BEEN MOVING AROUND A LOT MORE THAN USUAL: 0
SUM OF ALL RESPONSES TO PHQ QUESTIONS 1-9: 1
10. IF YOU CHECKED OFF ANY PROBLEMS, HOW DIFFICULT HAVE THESE PROBLEMS MADE IT FOR YOU TO DO YOUR WORK, TAKE CARE OF THINGS AT HOME, OR GET ALONG WITH OTHER PEOPLE: 0

## 2023-01-30 ASSESSMENT — LIFESTYLE VARIABLES
HOW OFTEN DO YOU HAVE A DRINK CONTAINING ALCOHOL: NEVER
HOW MANY STANDARD DRINKS CONTAINING ALCOHOL DO YOU HAVE ON A TYPICAL DAY: PATIENT DOES NOT DRINK

## 2023-01-30 ASSESSMENT — SOCIAL DETERMINANTS OF HEALTH (SDOH): HOW HARD IS IT FOR YOU TO PAY FOR THE VERY BASICS LIKE FOOD, HOUSING, MEDICAL CARE, AND HEATING?: NOT HARD AT ALL

## 2023-01-30 NOTE — TELEPHONE ENCOUNTER
Recent Visits  Date Type Provider Dept   11/08/22 Office Visit Kaleb Cardoza, DO [facility]   06/16/22 Office Visit Kaleb Cardoza, DO [facility]   03/21/22 Office Visit Kaleb Cardoza, DO [facility]   03/18/22 Office Visit Kaleb Cardoza, DO [facility]   12/08/21 Office Visit Kaleb Cardoza, DO [facility]   Showing recent visits within past 540 days with a meds authorizing provider and meeting all other requirements  Today's Visits  Date Type Provider Dept   01/30/23 Appointment Kaleb Cardoza, [address] today's visits with a meds authorizing provider and meeting all other requirements  Future Appointments  No visits were found meeting these conditions.   Showing future appointments within next 150 days with a meds authorizing provider and meeting all other requirements      Future Appointments   Date Time Provider Kimber Jacinto   1/30/2023  1:00 PM Kaleb Cardoza, [address] [place] [address]

## 2023-01-30 NOTE — PATIENT INSTRUCTIONS
LAB INSTRUCTIONS:    Please complete labs within 2 week(s). Please fast for 8 hours prior to lab collection. The clinic will call you within 1 week of collection. If you have not heard from us within that amount of time, please call us at [phone]. Advance Directives: Care Instructions  Overview  An advance directive is a legal way to state your wishes at the end of your life. It tells your family and your doctor what to do if you can't say what you want. There are two main types of advance directives. You can change them any time your wishes change. Living will. This form tells your family and your doctor your wishes about life support and other treatment. The form is also called a declaration. Medical power of . This form lets you name a person to make treatment decisions for you when you can't speak for yourself. This person is called a health care agent (health care proxy, health care surrogate). The form is also called a durable power of  for health care. If you do not have an advance directive, decisions about your medical care may be made by a family member, or by a doctor or a  who doesn't know you. It may help to think of an advance directive as a gift to the people who care for you. If you have one, they won't have to make tough decisions by themselves. For more information, including forms for your state, see the [address] website (www.caringinfo.org/planning/advance-directives/). Follow-up care is a key part of your treatment and safety. Be sure to make and go to all appointments, and call your doctor if you are having problems. It's also a good idea to know your test results and keep a list of the medicines you take. What should you include in an advance directive? Many states have a unique advance directive form. (It may ask you to address specific issues.) Or you might use a universal form that's approved by many states.   If your form doesn't tell you what to address, it may be hard to know what to include in your advance directive. Use the questions below to help you get started. Who do you want to make decisions about your medical care if you are not able to? What life-support measures do you want if you have a serious illness that gets worse over time or can't be cured? What are you most afraid of that might happen? (Maybe you're afraid of having pain, losing your independence, or being kept alive by machines.)  Where would you prefer to die? (Your home? A hospital? A nursing home?)  Do you want to donate your organs when you die? Do you want certain Taoism practices performed before you die? When should you call for help? Be sure to contact your doctor if you have any questions. Where can you learn more? Go to http://www.vides.com/ and enter R264 to learn more about \"Advance Directives: Care Instructions. \"  Current as of: June 16, 2022               Content Version: 13.5  © 1555-0387 Healthwise, Incorporated. Care instructions adapted under license by [facility] ([facility]). If you have questions about a medical condition or this instruction, always ask your healthcare professional. [place] [address] any warranty or liability for your use of this information. Personalized Preventive Plan for Gabby Deluca - 1/30/2023  Medicare offers a range of preventive health benefits. Some of the tests and screenings are paid in full while other may be subject to a deductible, co-insurance, and/or copay. Some of these benefits include a comprehensive review of your medical history including lifestyle, illnesses that may run in your family, and various assessments and screenings as appropriate. After reviewing your medical record and screening and assessments performed today your provider may have ordered immunizations, labs, imaging, and/or referrals for you.   A list of these orders (if applicable) as well as your Preventive Care list are included within your After Visit Summary for your review. Other Preventive Recommendations:    A preventive eye exam performed by an eye specialist is recommended every 1-2 years to screen for glaucoma; cataracts, macular degeneration, and other eye disorders. A preventive dental visit is recommended every 6 months. Try to get at least 150 minutes of exercise per week or 10,000 steps per day on a pedometer . Order or download the FREE \"Exercise & Physical Activity: Your Everyday Guide\" from [employer]. Call 2-271.723.1204 or search [employer] online. You need 0454-6513 mg of calcium and 9902-7857 IU of vitamin D per day. It is possible to meet your calcium requirement with diet alone, but a vitamin D supplement is usually necessary to meet this goal.  When exposed to the sun, use a sunscreen that protects against both UVA and UVB radiation with an SPF of 30 or greater. Reapply every 2 to 3 hours or after sweating, drying off with a towel, or swimming. Always wear a seat belt when traveling in a car. Always wear a helmet when riding a bicycle or motorcycle. Learning About Lung Cancer Screening  What is screening for lung cancer? Lung cancer screening is a way to find some lung cancers early, before a person has any symptoms of the cancer. Lung cancer screening may help those who have the highest risk for lung cancer--people age 48 and older who are or were heavy smokers. For most people, who aren't at increased risk, screening for lung cancer probably isn't helpful. Screening won't prevent cancer. And it may not find all lung cancers. Lung cancer screening may lower the risk of dying from lung cancer in a small number of people. How is it done? Lung cancer screening is done with a low-dose CT (computed tomography) scan. A CT scan uses X-rays, or radiation, to make detailed pictures of your body.  Experts recommend that screening be done in medical centers that focus on finding and treating lung cancer. Who is screening recommended for? Lung cancer screening is recommended for people age 48 and older who are or were heavy smokers. That means people with a smoking history of at least 20 pack years. A pack year is a way to measure how heavy a smoker you are or were. To figure out your pack years, multiply how many packs a day on average (assuming 20 cigarettes per pack) you have smoked by how many years you have smoked. For example: If you smoked 1 pack a day for 20 years, that's 1 times 20. So you have a smoking history of 20 pack years. If you smoked 2 packs a day for 10 years, that's 2 times 10. So you have a smoking history of 20 pack years. Experts agree that screening is for people who have a high risk of lung cancer. But experts don't agree on what high risk means. Some say people age 48 or older with at least a 20-pack-year smoking history are high risk. Others say it's people age 54 or older with a 30-pack-year history. To see if you could benefit from screening, first find out if you are at high risk for lung cancer. Your doctor can help you decide your lung cancer risk. What are the risks of screening? CT screening for lung cancer isn't perfect. It can show an abnormal result when it turns out there wasn't any cancer. This is called a false-positive result. This means you may need more tests to make sure you don't have cancer. These tests can be harmful and cause a lot of worry. These tests may include more CT scans and invasive testing like a lung biopsy. In a biopsy, the doctor takes a sample of tissue from inside your lung so it can be looked at under a microscope. A biopsy is the only way to tell if you have lung cancer. If the biopsy finds cancer, you and your doctor will have to decide how or whether to treat it.   Some lung cancers found on CT scans are harmless and would not have caused a problem if they had not been found through screening. But because doctors can't tell which ones will turn out to be harmless, most will be treated. This means that you may get treatment--including surgery, radiation, or chemotherapy--that you don't need. There is a risk of damage to cells or tissue from being exposed to radiation, including the small amounts used in CTs, X-rays, and other medical tests. Over time, exposure to radiation may cause cancer and other health problems. But in most cases, the risk of getting cancer from being exposed to small amounts of radiation is low. It's not a reason to avoid these tests for most people. What are the benefits of screening? Your scan may be normal (negative). For some people who are at higher risk, screening lowers the chance of dying of lung cancer. How much and how long you smoked helps to determine your risk level. Screening can find some cancers early, when treatment may be more likely to work. What happens after screening? The results of your CT scan will be sent to your doctor. Someone from your care team will explain the results of your scan and answer any questions you may have. If you need any follow-up, he or she will help you understand what to do next. After a lung cancer screening, you can go back to your usual activities right away. A lung cancer screening test can't tell if you have lung cancer. If your results are positive, your doctor can't tell whether an abnormal finding is a harmless nodule, cancer, or something else without doing more tests. What can you do to help prevent lung cancer? Some lung cancers can't be prevented. But if you smoke, quitting smoking is the best step you can take to prevent lung cancer. If you want to quit, your doctor can recommend medicines or other ways to help. Follow-up care is a key part of your treatment and safety. Be sure to make and go to all appointments, and call your doctor if you are having problems.  It's also a good idea to know your test results and keep a list of the medicines you take. Where can you learn more? Go to http://www.vides.com/ and enter Q940 to learn more about \"Learning About Lung Cancer Screening. \"  Current as of: May 4, 2022               Content Version: 13.5  © 8010-2422 Healthwise, Incorporated. Care instructions adapted under license by [facility] ([facility]). If you have questions about a medical condition or this instruction, always ask your healthcare professional. [address] any warranty or liability for your use of this information.

## 2023-02-06 ENCOUNTER — TELEPHONE (OUTPATIENT)
Dept: FAMILY MEDICINE CLINIC | Age: 63
End: 2023-02-06

## 2023-02-06 ENCOUNTER — HOSPITAL ENCOUNTER (OUTPATIENT)
Dept: CT IMAGING | Age: 63
Discharge: HOME | End: 2023-02-06
Payer: MEDICARE

## 2023-02-06 ENCOUNTER — HOSPITAL ENCOUNTER (OUTPATIENT)
Dept: WOMENS IMAGING | Age: 63
Discharge: HOME | End: 2023-02-06
Payer: MEDICARE

## 2023-02-06 DIAGNOSIS — Z12.31 ENCOUNTER FOR SCREENING MAMMOGRAM FOR MALIGNANT NEOPLASM OF BREAST: ICD-10-CM

## 2023-02-06 DIAGNOSIS — F17.210 CIGARETTE NICOTINE DEPENDENCE WITHOUT COMPLICATION: ICD-10-CM

## 2023-02-06 PROCEDURE — 77063 BREAST TOMOSYNTHESIS BI: CPT

## 2023-02-06 PROCEDURE — 71271 CT THORAX LUNG CANCER SCR C-: CPT

## 2023-02-06 NOTE — TELEPHONE ENCOUNTER
----- Message from Gladys Matta DO sent at 2/6/2023 10:15 AM EST -----  Please let pt know that mammogram is normal. Let me know if questions, thanks!

## 2023-02-06 NOTE — TELEPHONE ENCOUNTER
----- Message from [employer], DO sent at 2/6/2023  9:58 AM EST -----  Please let pt know that LDCT of chest is negative for cancer. Repeat 1 year per current guidelines. Let me know if questions, thanks!

## 2023-02-21 ENCOUNTER — HOSPITAL ENCOUNTER (OUTPATIENT)
Age: 63
Discharge: HOME | End: 2023-02-21
Payer: MEDICARE

## 2023-02-21 DIAGNOSIS — I71.40 ABDOMINAL AORTIC ANEURYSM (AAA) WITHOUT RUPTURE, UNSPECIFIED PART: ICD-10-CM

## 2023-02-21 DIAGNOSIS — R63.4 WEIGHT LOSS, UNINTENTIONAL: ICD-10-CM

## 2023-02-21 DIAGNOSIS — R73.03 PREDIABETES: ICD-10-CM

## 2023-02-21 DIAGNOSIS — R73.9 HYPERGLYCEMIA: ICD-10-CM

## 2023-02-21 DIAGNOSIS — I10 ESSENTIAL HYPERTENSION: ICD-10-CM

## 2023-02-21 DIAGNOSIS — E78.5 DYSLIPIDEMIA: ICD-10-CM

## 2023-02-21 LAB
ALBUMIN SERPL BCG-MCNC: 4.1 G/DL (ref 3.5–5.1)
ALP SERPL-CCNC: 113 U/L (ref 38–126)
ALT SERPL W/O P-5'-P-CCNC: 12 U/L (ref 11–66)
ANION GAP SERPL CALC-SCNC: 11 MEQ/L (ref 8–16)
AST SERPL-CCNC: 13 U/L (ref 5–40)
BASOPHILS ABSOLUTE: 0 THOU/MM3 (ref 0–0.1)
BASOPHILS NFR BLD AUTO: 0.5 %
BILIRUB SERPL-MCNC: 0.3 MG/DL (ref 0.3–1.2)
BUN SERPL-MCNC: 10 MG/DL (ref 7–22)
CALCIUM SERPL-MCNC: 9.5 MG/DL (ref 8.5–10.5)
CHLORIDE SERPL-SCNC: 108 MEQ/L (ref 98–111)
CHOLEST SERPL-MCNC: 121 MG/DL (ref 100–199)
CO2 SERPL-SCNC: 27 MEQ/L (ref 23–33)
CREAT SERPL-MCNC: 0.9 MG/DL (ref 0.4–1.2)
CREAT UR-MCNC: 119.5 MG/DL
CRP SERPL-MCNC: < 0.3 MG/DL (ref 0–1)
DEPRECATED MEAN GLUCOSE BLD GHB EST-ACNC: 123 MG/DL (ref 70–126)
DEPRECATED RDW RBC AUTO: 47.4 FL (ref 35–45)
EOSINOPHIL NFR BLD AUTO: 0.9 %
EOSINOPHILS ABSOLUTE: 0.1 THOU/MM3 (ref 0–0.4)
ERYTHROCYTE [DISTWIDTH] IN BLOOD BY AUTOMATED COUNT: 14.4 % (ref 11.5–14.5)
ERYTHROCYTE [SEDIMENTATION RATE] IN BLOOD BY WESTERGREN METHOD: 8 MM/HR (ref 0–20)
GFR SERPL CREATININE-BSD FRML MDRD: > 60 ML/MIN/1.73M2
GLUCOSE SERPL-MCNC: 89 MG/DL (ref 70–108)
HBA1C MFR BLD HPLC: 6.1 % (ref 4.4–6.4)
HCT VFR BLD AUTO: 49 % (ref 37–47)
HCV IGG SERPL QL IA: NEGATIVE
HDLC SERPL-MCNC: 41 MG/DL
HGB BLD-MCNC: 16.1 GM/DL (ref 12–16)
IMM GRANULOCYTES # BLD AUTO: 0.01 THOU/MM3 (ref 0–0.07)
IMM GRANULOCYTES NFR BLD AUTO: 0.2 %
LDLC SERPL CALC-MCNC: 58 MG/DL
LYMPHOCYTES ABSOLUTE: 2 THOU/MM3 (ref 1–4.8)
LYMPHOCYTES NFR BLD AUTO: 31.7 %
MCH RBC QN AUTO: 29.3 PG (ref 26–33)
MCHC RBC AUTO-ENTMCNC: 32.9 GM/DL (ref 32.2–35.5)
MCV RBC AUTO: 89.1 FL (ref 81–99)
MICROALBUMIN UR-MCNC: < 1.2 MG/DL
MICROALBUMIN/CREAT RATIO PNL UR: 10 MG/G (ref 0–30)
MONOCYTES ABSOLUTE: 0.4 THOU/MM3 (ref 0.4–1.3)
MONOCYTES NFR BLD AUTO: 6.3 %
NEUTROPHILS NFR BLD AUTO: 60.4 %
NRBC BLD AUTO-RTO: 0 /100 WBC
PLATELET # BLD AUTO: 198 THOU/MM3 (ref 130–400)
PMV BLD AUTO: 10.4 FL (ref 9.4–12.4)
POTASSIUM SERPL-SCNC: 4 MEQ/L (ref 3.5–5.2)
PROT SERPL-MCNC: 6.1 G/DL (ref 6.1–8)
RBC # BLD AUTO: 5.5 MILL/MM3 (ref 4.2–5.4)
SEGMENTED NEUTROPHILS ABSOLUTE COUNT: 3.9 THOU/MM3 (ref 1.8–7.7)
SODIUM SERPL-SCNC: 146 MEQ/L (ref 135–145)
TRIGL SERPL-MCNC: 109 MG/DL (ref 0–199)
TSH SERPL DL<=0.005 MIU/L-ACNC: 2.19 UIU/ML (ref 0.4–4.2)
WBC # BLD AUTO: 6.4 THOU/MM3 (ref 4.8–10.8)

## 2023-02-21 PROCEDURE — 80053 COMPREHEN METABOLIC PANEL: CPT

## 2023-02-21 PROCEDURE — 85651 RBC SED RATE NONAUTOMATED: CPT

## 2023-02-21 PROCEDURE — 83036 HEMOGLOBIN GLYCOSYLATED A1C: CPT

## 2023-02-21 PROCEDURE — 36415 COLL VENOUS BLD VENIPUNCTURE: CPT

## 2023-02-21 PROCEDURE — 86803 HEPATITIS C AB TEST: CPT

## 2023-02-21 PROCEDURE — 82043 UR ALBUMIN QUANTITATIVE: CPT

## 2023-02-21 PROCEDURE — 80061 LIPID PANEL: CPT

## 2023-02-21 PROCEDURE — 85025 COMPLETE CBC W/AUTO DIFF WBC: CPT

## 2023-02-21 PROCEDURE — 84443 ASSAY THYROID STIM HORMONE: CPT

## 2023-02-21 PROCEDURE — 86140 C-REACTIVE PROTEIN: CPT

## 2023-02-21 PROCEDURE — 87389 HIV-1 AG W/HIV-1&-2 AB AG IA: CPT

## 2023-02-22 ENCOUNTER — TELEPHONE (OUTPATIENT)
Dept: FAMILY MEDICINE CLINIC | Age: 63
End: 2023-02-22

## 2023-02-22 LAB — HIV 1+2 AB+HIV1 P24 AG SERPL QL IA: NONREACTIVE

## 2023-02-22 NOTE — TELEPHONE ENCOUNTER
----- Message from Courtney Osorio, DO sent at 2/21/2023  6:52 PM EST -----  Please let pt know that not all labs are back, but most are  What is back thus far looks god  Will call with final results once available. F/u as scheduled. Let me know if questions, thanks!
[facility] Hospital Medicine  Discharge Summary      Patient Name: Walter Bull  MRN: [id number]  Admission Date: 10/6/2019  Hospital Length of Stay: 1 days  Discharge Date and Time:  10/07/2019 4:59 PM  Attending Physician: Christie Harper MD   Discharging Provider: Wendy Pablo NP  Primary Care Provider: Belkys Kruger MD  Hospital Medicine Team: [facility] J Wendy Pablo NP    HPI:   67 y/o male with PMH of CAD h/o NSTEMI ([facility] 11/18 no intervention) / CABGx5 (15 yrs ago), LBBB, Diastolic HF, AICD ([employer] dual chamber), T2DM, HTN, HLD, CKD 3, MGUS, and anemia. Patient presented to [place] ED with c/o multiple bouts of L sided chest pain / pressure/ like someone sitting on his chest/ diaphoretic/ nauseated. Stated pain at it's intense was 8/10, L chest nonradiating.  Given nitro in the ER with some relief but pain persisted. Troponin's have been negative x4 with no EKG changes c/w ischemia.  CXR negative. His case was discussed with on call cardiologist Dr. Cook who advised transfer for further evaluation with cardiology given extensive cardiac history.  It appears his PCP has him taking both Coreg and Metoprolol XL low doses each which h/o of b/p fluctuations.     11/16/18 LHC:  · Origin to Prox Graft lesion , 100% stenosed.  · LVEDP (Pre): 18  · Prox RCA lesion , 100% stenosed.  · Mid LM to Ost LAD lesion , 100% stenosed.  · Estimated blood loss: none  · Three vessel coronary artery disease.      * No surgery found *      Hospital Course:   Transferred from [place] for chest pain, no ecg changes and negative troponin's with significant story c/w USA, CP + Diaphoretic and nauseated.  Co managed cardiology consulted.  They discussed his case with interventional to see the utility of [facility] since he has severe disease from [facility] 11/18. Recommendations were to get an Nuc Stress to quantify ischemia for future endeavors.  Nuc stress results reveal fixed defect 25-30% of 
Patient informed and understood.
inferior wall of LV.  Preserved EF with stress, decreased at rest.  No evidence of ischemia. Dr. Rodriguez suggests to have HTS evaluate for advanced options. In interim, he will be medically managed with increased Imdur to 120 mg daily, continued Ranexa, Atorva 40, ASA 81, home Brilinta 90 bid, Coreg 6.25 daily changed to Coreg 12.5 mg bid. His pcp had him on daily coreg and daily metoprolol inadvertantly. Metoprolol d/c'ed. Continue  zetia 10. In hospital hydralazine transitioned back to home losartan as his CR improved from 1.9 back down to 1.4. He was diuresed 5 lbs fluid, d/c weight is 234 lbs.  Resume lasix as previously prescribed and titrate for fluid weight gain.  Encouraged 20 lbs weight loss.  Continue CPAP machine.     Dispo: home with cardiology f/u with Dr. Capone, ICD device interrogation department notified of in house ? Reschedule or interrogate prior to discharge.      Consults:   Consults (From admission, onward)        Status Ordering Provider     Inpatient consult to Cardiology  Once     Provider:  Matt Cook MD    Completed TRICIA BYRNES     Inpatient consult to Endocrinology  Once     Provider:  (Not yet assigned)    Completed TRICIA BYRNES     Inpatient consult to Nephrology  Once     Provider:  (Not yet assigned)    Completed TRICIA BYRNES     Inpatient consult to Social Work/Case Management  Once     Provider:  (Not yet assigned)    Acknowledged DARWIN TOSCANO      Review of Systems   Constitutional: Negative for diaphoresis. Negative for activity change, appetite change, chills, fatigue and fever.   HENT: Negative for congestion, rhinorrhea, sinus pressure, sore throat and trouble swallowing.    Eyes: Negative for pain, redness and visual disturbance.   Respiratory: Intermittent episodes of chest tightness o/n.  No longer shortness of breath. Negative for cough, wheezing and stridor.    Cardiovascular: Intermittent episodes for chest pain o/n. Negative for 
palpitations and leg swelling.   Gastrointestinal: Negative for nausea. Negative for abdominal distention, abdominal pain, blood in stool, constipation, diarrhea and vomiting.   Endocrine: Negative for cold intolerance and heat intolerance.   Genitourinary: Negative for dysuria, frequency, hematuria and urgency.   Musculoskeletal: Negative for arthralgias, back pain, myalgias and neck pain.   Skin: Negative for color change, pallor and rash.   Allergic/Immunologic: Negative for immunocompromised state.   Neurological: Negative for dizziness, tremors, syncope, weakness, light-headedness, numbness and headaches.   Hematological: Does not bruise/bleed easily.   Psychiatric/Behavioral: Negative for agitation, confusion and sleep disturbance. The patient is not nervous/anxious.     Physical Exam   Constitutional: He is oriented to person, place, and time. He appears well-developed and well-nourished. No distress.   HENT:   Head: Normocephalic and atraumatic.   Right Ear: External ear normal.   Left Ear: External ear normal.   Nose: Nose normal.   Mouth/Throat: Oropharynx is clear and moist.   Eyes: Conjunctivae and EOM are normal. No scleral icterus.   Neck: Normal range of motion. Neck supple. NO longer with Hepatojugular reflux and JVD. No tracheal deviation present. No thyromegaly present.   Cardiovascular: Normal rate, regular rhythm, normal heart sounds and intact distal pulses. Exam reveals no gallop and no friction rub.   No murmur heard.  Pulmonary/Chest: Effort normal and breath sounds normal. No respiratory distress. He has no wheezes. He has no rales.   Abdominal: Soft. Bowel sounds are normal. He exhibits no distension and no mass. There is no tenderness. There is no rebound and no guarding.   Musculoskeletal: Normal range of motion. No longer exhibits edema (+1). He exhibits no tenderness.   Neurological: He is alert and oriented to person, place, and time. No cranial nerve deficit or sensory deficit. He 
exhibits normal muscle tone. Coordination normal.   Skin: Skin is warm and dry. No rash noted. No erythema.   Psychiatric: He has a normal mood and affect. His behavior is normal. Judgment and thought content normal.   Nursing note and vitals reviewed.    * Chest pain  -  H/o severe CAD, + CP neg trop's, no ecg changes  - increased IMDUR, increased Coreg home dose to 12.5 bid from 6.25 daily, d/c'ed home metoprolol as he does not need two beta blockers   - s/p Nuc stress - fixed defect inferior wall of LV 25-30%. No active ischemia.     - consulted cards co managed for further management options: they reviewed films with Dr. Jose Antonio Rodriguez agreed with Nuc spec.  Medical management.  Dr. Rodriguez suggests to refer to [facility] for advanced options.    - D dimer elevated 0.52?       ANURADHA (obstructive sleep apnea)  - ordered cpap 11 cmh20, respiratory dropped off cpap machine but never came back to put patient on machine      ICD (implantable cardioverter-defibrillator), dual, in situ  - [employer] dual chamber ICD  - DDD Base rate 60  - was supposed to be interrogated today, informed of inpt status, they need to reschedule or check while he's here    Severe obesity (BMI 35.0-39.9) with comorbidity  - encouraged daily ambulation/ exercise along with dietary changes   - he works outside in the heat       Iron deficiency anemia  - iron daily      Benign essential HTN  - d/c'ed metoprolol in lieu of Coreg and will titrate as needed  - held cozaar initially d/t elevated CR 1.9, CR back down resume upon discharge        GERD (gastroesophageal reflux disease)  - continue PPI    HLD (hyperlipidemia)  - chol WNL on home regimen      Diabetes mellitus type 2 in obese  -  OSH ED, 300's here upon admit  - Wears a VGO pump/ click. Endocrine consulted and assisted in transitioning from home NPH and VGO to in house regimen.    - hgba1c 7.5 8/19  - 5 units with meals aspart  - endo added levemir as they removed his VGO pump while in house  - SSI 
  - avoid hypoglycemia  - resume home regimen    Coronary artery disease involving native coronary artery of native heart without angina pectoris  - see above      Unstable angina-resolved as of 10/7/2019  - waiting for card's final rec's      Acute kidney injury superimposed on CKD-resolved as of 10/7/2019  - h/o MGUS  - Acute Renal failure, Cr dropped from 1.9 to 1.4 without perceived intervention  - consulted nephrology, urine studies underway, Renal US c/w medical renal dz  - avoid dye if possible  - avoid nephrotoxic agents and extremes in b/p       Final Active Diagnoses:    Diagnosis Date Noted POA    PRINCIPAL PROBLEM:  Chest pain [R07.9] 05/17/2019 Yes    ANURADHA (obstructive sleep apnea) [G47.33]  Yes    ICD (implantable cardioverter-defibrillator), dual, in situ [Z95.810] 11/28/2018 Yes     Chronic    Severe obesity (BMI 35.0-39.9) with comorbidity [E66.01] 09/25/2018 Yes    Iron deficiency anemia [D50.9] 02/21/2018 Yes    Benign essential HTN [I10] 07/19/2017 Yes     Chronic    Coronary artery disease involving native coronary artery of native heart without angina pectoris [I25.10] 01/11/2017 Yes     Chronic    Diabetes mellitus type 2 in obese [E11.69, E66.9] 01/11/2017 Yes     Chronic    GERD (gastroesophageal reflux disease) [K21.9] 01/11/2017 Yes     Chronic    HLD (hyperlipidemia) [E78.5] 01/11/2017 Yes     Chronic      Problems Resolved During this Admission:    Diagnosis Date Noted Date Resolved POA    Unstable angina [I20.0] 10/06/2019 10/07/2019 Yes    Acute kidney injury superimposed on CKD [N17.9, N18.9] 05/18/2019 10/07/2019 Yes       Discharged Condition: good    Disposition: Home or Self Care    Follow Up:    Patient Instructions:      Ambulatory referral to Cardiology   Referral Priority: Routine Referral Type: Consultation   Referral Reason: Specialty Services Required   Referred to Provider: DANIELE BUCIO Requested Specialty: Cardiology   Number of Visits Requested: 1 
    Ambulatory referral to Cardiology   Referral Priority: Routine Referral Type: Consultation   Referral Reason: Specialty Services Required   Requested Specialty: Cardiology   Number of Visits Requested: 1     Notify your health care provider if you experience any of the following:  temperature >100.4     Notify your health care provider if you experience any of the following:  persistent nausea and vomiting or diarrhea     Notify your health care provider if you experience any of the following:  severe uncontrolled pain     Notify your health care provider if you experience any of the following:  redness, tenderness, or signs of infection (pain, swelling, redness, odor or green/yellow discharge around incision site)     Notify your health care provider if you experience any of the following:  difficulty breathing or increased cough     Notify your health care provider if you experience any of the following:  severe persistent headache     Notify your health care provider if you experience any of the following:  worsening rash     Notify your health care provider if you experience any of the following:  persistent dizziness, light-headedness, or visual disturbances     Notify your health care provider if you experience any of the following:  increased confusion or weakness     Activity as tolerated       Significant Diagnostic Studies: Labs:   CMP   Recent Labs   Lab 10/05/19  2023 10/06/19  0947 10/07/19  0409   * 133* 133*   K 5.1 4.6 4.0   CL 98 100 99   CO2 24 26 24   * 310* 273*   BUN 19 20 22   CREATININE 1.9* 1.4 1.4   CALCIUM 9.0 9.2 8.8   PROT 6.9 6.8 6.4   ALBUMIN 3.6 3.5 3.3*   BILITOT 0.4 0.5 0.6   ALKPHOS 77 62 63   AST 45* 34 34   ALT 53* 49* 43   ANIONGAP 10 7* 10   ESTGFRAFRICA 40* 58.4* 58.4*   EGFRNONAA 35* 50.5* 50.5*   , CBC   Recent Labs   Lab 10/05/19  2023 10/06/19  0947   WBC 8.35 10.89   HGB 11.5* 11.6*   HCT 36.2* 37.4*    169    and A1C:   Recent Labs   Lab 
05/18/19  0435 06/24/19  0753 08/07/19  0832   HGBA1C 7.6* 7.7* 7.5*       Pending Diagnostic Studies:     Procedure Component Value Units Date/Time    Protein electrophoresis, timed urine [[id number]] Collected:  10/06/19 1115    Order Status:  Sent Lab Status:  In process Updated:  10/07/19 1126    Specimen:  Urine, Clean Catch     Protein electrophoresis, timed urine [[id number]] Collected:  10/06/19    Order Status:  Sent Lab Status:  No result     Specimen:  Urine, Clean Catch          Medications:  Reconciled Home Medications:      Medication List      START taking these medications    ferrous sulfate 325 (65 FE) MG EC tablet  Take 1 tablet (325 mg total) by mouth once daily.  Start taking on:  October 8, 2019  Replaces:  ferrous sulfate 325 mg (65 mg iron) Tab tablet        CHANGE how you take these medications    carvedilol 12.5 MG tablet  Commonly known as:  COREG  Take 1 tablet (12.5 mg total) by mouth 2 (two) times daily.  What changed:    · medication strength  · how much to take  · when to take this     isosorbide mononitrate 120 MG 24 hr tablet  Commonly known as:  IMDUR  Take 1 tablet (120 mg total) by mouth once daily.  Start taking on:  October 8, 2019  What changed:    · medication strength  · how much to take        CONTINUE taking these medications    albuterol 90 mcg/actuation inhaler  Commonly known as:  PROVENTIL/VENTOLIN HFA  Inhale 2 puffs into the lungs every 6 (six) hours as needed for Wheezing.     amiodarone 200 MG Tab  Commonly known as:  PACERONE  Take 1 tablet (200 mg total) by mouth once daily.     ammonium lactate 12 % Crea  Apply small amount to feet except for in between toes twice a day     aspirin 81 MG EC tablet  Commonly known as:  ECOTRIN  Take 1 tablet (81 mg total) by mouth once daily.     BRILINTA 90 mg tablet  Generic drug:  ticagrelor  Take 90 mg by mouth 2 (two) times daily.     esomeprazole 40 MG capsule  Commonly known as:  NEXIUM  Take 1 capsule (40 mg total) by mouth 
before breakfast.     ezetimibe 10 mg tablet  Commonly known as:  ZETIA  Take 1 tablet (10 mg total) by mouth once daily.     finasteride 5 mg tablet  Commonly known as:  PROSCAR  HOLD DUE TO ORTHOSTATIC HYPOTENSION     furosemide 20 MG tablet  Commonly known as:  LASIX  Take 1 tablet (20 mg total) by mouth once daily. Take an extra tablet daily for wt gain more than 3 pounds/day or 5 pounds/week     insulin aspart U-100 100 unit/mL injection  Commonly known as:  NovoLOG U-100 Insulin aspart  3 x10ml vials required per month for use in VGO 30     insulin  unit/mL injection  Use 30 Units in the morning and 24 Units at night     losartan 25 MG tablet  Commonly known as:  COZAAR  Take 1 tablet (25 mg total) by mouth once daily.     nitroGLYCERIN 0.4 MG SL tablet  Commonly known as:  NITROSTAT  DISSOLVE ONE TABLET UNDER THE TONGUE EVERY 5 MINUTES AS NEEDED FOR CHEST PAIN.     RANEXA 1,000 mg Tb12  Generic drug:  ranolazine  Take 1 tablet (1,000 mg total) by mouth 2 (two) times daily.     rosuvastatin 40 MG Tab  Commonly known as:  CRESTOR  Take 1 tablet (40 mg total) by mouth once daily.     SITagliptin 100 MG Tab  Commonly known as:  JANUVIA  Take 1 tablet (100 mg total) by mouth once daily.     * sub-q insulin device, 30 unit [place]  Commonly known as:  V-GO 30  1 VGo every 24 hr as directed     * sub-q insulin device, 30 unit [place]  Commonly known as:  V-GO 30  1 VGo every 24 hr as directed     tamsulosin 0.4 mg Cap  Commonly known as:  FLOMAX  Take 1 capsule (0.4 mg total) by mouth once daily.     traMADol 50 mg tablet  Commonly known as:  ULTRAM  Take 1 tablet (50 mg total) by mouth every 8 (eight) hours as needed for Pain.         * This list has 2 medication(s) that are the same as other medications prescribed for you. Read the directions carefully, and ask your doctor or other care provider to review them with you.            STOP taking these medications    amoxicillin-clavulanate 875-125mg 875-125 mg per 
tablet  Commonly known as:  AUGMENTIN     ferrous sulfate 325 mg (65 mg iron) Tab tablet  Commonly known as:  FEOSOL  Replaced by:  ferrous sulfate 325 (65 FE) MG EC tablet     metoprolol succinate 25 MG 24 hr tablet  Commonly known as:  TOPROL-XL            Indwelling Lines/Drains at time of discharge:   Lines/Drains/Airways     None                 Time spent on the discharge of patient: 59 minutes  Patient was seen and examined on the date of discharge and determined to be suitable for discharge.         Wendy Pablo NP  Department of Hospital Medicine  [facility]  Spectra:  [phone]  Pager: [phone]    
Patient with one or more new problems requiring additional work-up/treatment.

## 2023-02-22 NOTE — TELEPHONE ENCOUNTER
----- Message from Jillian Lyons DO sent at 2/22/2023  1:55 PM EST -----  Please let pt know that final lab is back and is WNL  Let me know if questions, thanks!

## 2023-02-26 NOTE — PROGRESS NOTES
Chief Complaint   Patient presents with    Follow-up     4 week follow up weight loss       History obtained from the patient.     SUBJECTIVE:  Carroll Camarillo is a 57 y.o. female that presents today for     -ER f/u for abdominal pain PRIOR VISIT:  Chronic epigastric abd pain  Started in March of this year  Referred to GI  No obvious cause despite EGD, colo CT abd/pelvis and CTA abdomen  Sxs had actually improved some in Aug and SEPT  But late OCT sxs returned  Last saw Christina Lord, end of July 2022  At that point was referred to pain management, but she never heard back from them  Having inc pain in epigastrium again  4-7/10  Epigastric  Does not radiate  Not related to food  Is taking pantoprazole and pepcid  Some nausea  No vomiting  No blood in stool  No melena  Hasn't seen GI back since sxs returned  Stopped carafate since not helpful  Bentyl and zofran helping some  Wts stable    UPDATE LAST VISIT:   Abd pain sig improved  Neg w/u with GI, including EGD, CT abd pelvis and CTA abd pelvis  Was being setup for nerve block with pain management  But bentyl added and sxs sig improved  Does have chronic pancreatitis that she see's Catia for   No diarrhea  Did have wt loss in the last 4 wks, about 10 lbs  States she feels fine  No fevers, chills or night sweats  Appetite good  No early satiety  No dysphagia  No hemoptysis  No SOB  Due for LDCT/VAZQUEZ    UPDATE TODAY:   Here for f/u on above  Wts down 4 lbs  Has on and off abd pain that is doing better than prior  Neg w/u with GI, including EGD, CT abd pelvis and CTA abd pelvis  Was being setup for nerve block with pain management  But bentyl added and sxs sig improved  Does have chronic pancreatitis that she see's Catia for   No diarrhea  States she feels fine other than on and off abd pain  No fevers, chills or night sweats  Appetite good  No early satiety  No dysphagia  No hemoptysis  No SOB  Neg LDCT  Neg CT abd/pelvis  Neg VAZQUEZ  UTD EGD/[facility], both recent     sick with cancer  So under stress  Not eating as much  Declines intervention for this      -PreDM:  Labs stable  Here to review labs.   Lab Results   Component Value Date/Time    LABA1C 6.1 2023 09:06 AM    LABA1C 5.8 2022 09:53 AM    LABA1C 5.8 2021 09:17 AM    LABA1C 5.6 06/15/2021 09:40 AM    LABA1C 6.1 2020 08:17 AM    LABA1C 6.3 2019 08:44 AM       -Smokin pack q 4 days  Working on quitting  Declines formal intervention      Age/Gender Health Maintenance    Lipid -   Lab Results   Component Value Date    CHOL 121 2023    CHOL 149 2022    CHOL 137 2021     Lab Results   Component Value Date    TRIG 109 2023    TRIG 143 2022    TRIG 112 2021     Lab Results   Component Value Date    HDL 41 2023    HDL 46 2022    HDL 48 2021     Lab Results   Component Value Date    LDLCALC 58 2023    LDLCALC 74 2022    LDLCALC 67 2021       DM Screen - 101 fasting, (2018)  Lab Results   Component Value Date/Time    GLUCOSE 89 2023 09:05 AM    GLUCOSE 143 10/15/2019 02:00 PM     Lab Results   Component Value Date/Time    LABA1C 6.1 2023 09:06 AM    LABA1C 5.8 2022 09:53 AM    LABA1C 5.8 2021 09:17 AM    LABA1C 5.6 06/15/2021 09:40 AM    LABA1C 6.1 2020 08:17 AM    LABA1C 6.3 2019 08:44 AM       Colon Cancer Screening - + sessile T/a 2019, repeat 2 years per GI, Catia/+ T/A 2022, repeat 5 years per GI, Catia  Lung Cancer Screening - NEG 2023    Tetanus - to get at pharmacy per medicare rules  Influenza Vaccine - UTD 2022  Pneumonia Vaccine - UTD PPV 2014  Zoster - to get at pharmacy per medicare rules    Breast Cancer Screening - NEG 2023  Cervical Cancer Screening - NEG 2019   Osteoporosis Screening - age 70      Specialist List  Neuro: [facility]  GI: Albin Coffman: [address]  Cardio: [address]  [facility]      Current Outpatient Medications   Medication Sig Dispense Refill    dicyclomine (BENTYL) 10 MG capsule take 1 capsule by mouth four times a day before meals and at bedtime 120 capsule 2    atorvastatin (LIPITOR) 80 MG tablet take 1 tablet by mouth once daily at bedtime 90 tablet 3    folic acid (FOLVITE) 1 MG tablet take 1 tablet by mouth once daily 90 tablet 3    pantoprazole (PROTONIX) 40 MG tablet Take 1 tablet by mouth every morning (before breakfast) 90 tablet 3    ondansetron (ZOFRAN) 4 MG tablet Take 1 tablet by mouth every 8 hours as needed for Nausea or Vomiting 1 tablet every 6-8 hours for nausea vomiting 90 tablet 0    clopidogrel (PLAVIX) 75 MG tablet take 1 tablet by mouth once daily 90 tablet 3    albuterol sulfate HFA (VENTOLIN HFA) 108 (90 Base) MCG/ACT inhaler Inhale 2 puffs into the lungs 4 times daily as needed for Wheezing or Shortness of Breath 54 g 0    famotidine (PEPCID) 20 MG tablet take 1 tablet by mouth twice a day 180 tablet 3    traZODone (DESYREL) 50 MG tablet take 1 tablet by mouth nightly 90 tablet 3    escitalopram (LEXAPRO) 10 MG tablet take 1 tablet by mouth daily 90 tablet 3    amLODIPine (NORVASC) 10 MG tablet take 1 tablet by mouth once daily 90 tablet 3    salmeterol (SEREVENT DISKUS) 50 MCG/DOSE diskus inhaler Inhale 1 puff into the lungs 2 times daily 1 each 3    acetaminophen (TYLENOL) 325 MG tablet Take 650-975 mg by mouth every 6 hours as needed for Pain      gabapentin (NEURONTIN) 300 MG capsule Take 1 capsule by mouth 3 times daily for 90 days. 90 capsule 2     No current facility-administered medications for this visit. No orders of the defined types were placed in this encounter. All medications reviewed and reconciled, including OTC and herbal medications. Updated list given to patient.        Patient Active Problem List    Diagnosis Date Noted    Major depression     Prediabetes     Chronic bilateral low back pain with left-sided sciatica 06/12/2018    GERD (gastroesophageal reflux disease)     Former smoker     Osteoarthritis     Dyslipidemia 2017    History of stroke with residual deficit; cryptogenic 05/15/2017     Followed with neuro at [facility], last note states can f/u prn.  Also follows with cardio at [facility]        Obesity (BMI 30-39.9) 2014    Essential hypertension 2014    AAA (abdominal aortic aneurysm) 2014    COPD (chronic obstructive pulmonary disease) (HCC)        Past Medical History:   Diagnosis Date    AAA (abdominal aortic aneurysm) 2014    [facility]    Chronic pancreatitis [facility])     COPD (chronic obstructive pulmonary disease) ([facility] [address]     Dyslipidemia     Essential hypertension 2014    Former smoker     High cholesterol     History of colon polyps     History of stroke with residual deficit; cryptogenic 2017    Follows with neuro and cardio at [facility]    Osteoarthritis        Past Surgical History:   Procedure Laterality Date    ABDOMEN SURGERY      c sections x3    CARPAL TUNNEL RELEASE Bilateral      SECTION      x3    COLONOSCOPY      Dr. Marino Park    EGD  ,,    Marysol Leigh      as child    UPPER GASTROINTESTINAL ENDOSCOPY         Allergies   Allergen Reactions    Aspirin      Upset stomach    Flagyl [Metronidazole] Other (See Comments)     abd pain    Ibuprofen      Stomach pain    Spiriva Handihaler [Tiotropium Bromide Monohydrate] Other (See Comments)     Upper chest pain     Sulfa Antibiotics Other (See Comments)     PT WAS KID DOESNT KNOW       Social History     Tobacco Use    Smoking status: Every Day     Packs/day: 1.00     Years: 46.00     Pack years: 46.00     Types: Cigarettes    Smokeless tobacco: Never   Substance Use Topics    Alcohol use: No     Alcohol/week: 0.0 standard drinks       Family History   Problem Relation Age of Onset    Diabetes Maternal Grandmother     Diabetes Mother     Heart Disease Mother     Cancer Father         Something in the chest, pt not sure what    Heart Disease Father     COPD Father     Diabetes Maternal Aunt     High Blood Pressure Brother     Arthritis Neg Hx     Asthma Neg Hx     Birth Defects Neg Hx     Depression Neg Hx     Early Death Neg Hx     Hearing Loss Neg Hx     High Cholesterol Neg Hx     Kidney Disease Neg Hx     Learning Disabilities Neg Hx     Mental Illness Neg Hx     Mental Retardation Neg Hx     Miscarriages / Stillbirths Neg Hx     Stroke Neg Hx     Substance Abuse Neg Hx     Vision Loss Neg Hx     Breast Cancer Neg Hx     Colon Cancer Neg Hx          I have reviewed the patient's past medical history, past surgical history, allergies, medications, social and family history and I have made updates where appropriate. Review of Systems  Positive responses are highlighted in bold    Constitutional:  Fever, Chills, Night Sweats, Fatigue, Unexpected changes in weight  HENT:  Ear pain, Tinnitus, Nosebleeds, Trouble swallowing, Hearing loss, Sore throat  Cardiovascular:  Chest Pain, Palpitations, Orthopnea, Paroxysmal Nocturnal Dyspnea  Respiratory:  Cough, Wheezing, Shortness of breath, Chest tightness, Apnea  Gastrointestinal:  Nausea, Vomiting, Diarrhea, Constipation, Heartburn, Blood in stool  Genitourinary:  Difficulty or painful urination, Flank pain, Change in frequency, Urgency  Skin:  Color change, Rash, Itching, Wound  Musculoskeletal:  Joint pain, Back pain, Gait problems, Joint swelling, Myalgias  Neurological:  Dizziness, Headaches, Presyncope, Numbness, Seizures, Tremors  Endocrine:  Heat Intolerance, Cold Intolerance, Polydipsia, Polyphagia, Polyuria      PHYSICAL EXAM:  Vitals:    02/27/23 0928   BP: 128/68   Pulse: 74   Resp: 16   Temp: 98.2 °F (36.8 °C)   SpO2: 98%   Weight: 160 lb (72.6 kg)   Height: 5' 5\" (1.651 m)     Body mass index is 26.63 kg/m².      Wt Readings from Last 3 Encounters:   02/27/23 160 lb (72.6 kg)   01/30/23 164 lb (74.4 kg)   12/29/22 169 lb 3.2 oz (76.7 kg)       VS Reviewed  General Appearance: A&O x 3, No acute distress,well developed and well- nourished  Eyes: pupils equal, round, and reactive to light, extraocular eye movements intact, conjunctivae and eye lids without erythema  ENT: external ear and ear canal clear bilaterally, TMs intact and regular, nose without deformity, nasal mucosa and turbinates normal without polyps, oropharynx normal, dentition is normal for age  Neck: supple and non-tender without mass, no thyromegaly or thyroid nodules, no cervical lymphadenopathy  Pulmonary/Chest: distant but clear to auscultation bilaterally- no wheezes, rales or rhonchi, normal air movement, no respiratory distress or retractions  Cardiovascular: distant with S1 and S2 auscultated w/ RRR. No murmurs, rubs, clicks, or gallops, distal pulses intact. Abdomen: soft, no TTP, non-distended, bowel sounds physiologic,  no rebound or guarding, no masses or hernias noted. Liver and spleen without enlargement. Extremities: no cyanosis, clubbing or edema of the lower extremities. +2 PT/DP bilaterally. Musculoskeletal: No joint swelling or gross deformity   Skin: warm and dry. No rash or erythema. Psych: Affect appropriate. Mood euthymic. Thought process is normal without evidence of depression or psychosis. Good insight and appropriate interaction. Cognition and memory appear to be intact.       Hospital Outpatient Visit on 02/21/2023   Component Date Value Ref Range Status    Microalbumin, Random Urine 02/21/2023 < 1.20  mg/dL Final    Creatinine, Urine 02/21/2023 119.5  mg/dL Final    Microalb/Creat Ratio 02/21/2023 10  0 - 30 mg/g Final    HIV Ag/Ab 02/21/2023 NONREACTIVE  NR Final    Hepatitis C Ab 02/21/2023 Negative   Final    CRP 02/21/2023 < 0.30  0.00 - 1.00 mg/dl Final    Sed Rate 02/21/2023 8  0 - 20 mm/hr Final    Hemoglobin A1C 02/21/2023 6.1  4.4 - 6.4 % Final    AVERAGE GLUCOSE 02/21/2023 123  70 - 126 mg/dL Final    TSH 02/21/2023 2.190  0.400 - 4.200 uIU/mL Final    Cholesterol, Total 02/21/2023 121  100 - 199 mg/dL Final    Triglycerides 02/21/2023 109  0 - 199 mg/dL Final    HDL 02/21/2023 41  mg/dL Final    LDL Calculated 02/21/2023 58  mg/dL Final    Glucose 02/21/2023 89  70 - 108 mg/dL Final    Creatinine 02/21/2023 0.9  0.4 - 1.2 mg/dL Final    BUN 02/21/2023 10  7 - 22 mg/dL Final    Sodium 02/21/2023 146 (A)  135 - 145 meq/L Final    Potassium 02/21/2023 4.0  3.5 - 5.2 meq/L Final    Chloride 02/21/2023 108  98 - 111 meq/L Final    CO2 02/21/2023 27  23 - 33 meq/L Final    Calcium 02/21/2023 9.5  8.5 - 10.5 mg/dL Final    AST 02/21/2023 13  5 - 40 U/L Final    Alkaline Phosphatase 02/21/2023 113  38 - 126 U/L Final    Total Protein 02/21/2023 6.1  6.1 - 8.0 g/dL Final    Albumin 02/21/2023 4.1  3.5 - 5.1 g/dL Final    Total Bilirubin 02/21/2023 0.3  0.3 - 1.2 mg/dL Final    ALT 02/21/2023 12  11 - 66 U/L Final    WBC 02/21/2023 6.4  4.8 - 10.8 thou/mm3 Final    RBC 02/21/2023 5.50 (A)  4.20 - 5.40 mill/mm3 Final    Hemoglobin 02/21/2023 16.1 (A)  12.0 - 16.0 gm/dl Final    Hematocrit 02/21/2023 49.0 (A)  37.0 - 47.0 % Final    MCV 02/21/2023 89.1  81.0 - 99.0 fL Final    MCH 02/21/2023 29.3  26.0 - 33.0 pg Final    MCHC 02/21/2023 32.9  32.2 - 35.5 gm/dl Final    RDW-CV 02/21/2023 14.4  11.5 - 14.5 % Final    RDW-SD 02/21/2023 47.4 (A)  35.0 - 45.0 fL Final    Platelets 24/13/9932 198  130 - 400 thou/mm3 Final    MPV 02/21/2023 10.4  9.4 - 12.4 fL Final    Seg Neutrophils 02/21/2023 60.4  % Final    Lymphocytes 02/21/2023 31.7  % Final    Monocytes 02/21/2023 6.3  % Final    Eosinophils 02/21/2023 0.9  % Final    Basophils 02/21/2023 0.5  % Final    Immature Granulocytes 02/21/2023 0.2  % Final    Segs Absolute 02/21/2023 3.9  1.8 - 7.7 thou/mm3 Final    Lymphocytes Absolute 02/21/2023 2.0  1.0 - 4.8 thou/mm3 Final    Monocytes Absolute 02/21/2023 0.4  0.4 - 1.3 thou/mm3 Final    Eosinophils Absolute 02/21/2023 0.1  0.0 - 0.4 thou/mm3 Final    Basophils Absolute 02/21/2023 0.0  0.0 - 0.1 thou/mm3 Final    Immature Grans (Abs) 02/21/2023 0.01  0.00 - 0.07 thou/mm3 Final    nRBC 02/21/2023 0  /100 wbc Final    Anion Gap 02/21/2023 11.0  8.0 - 16.0 meq/L Final    Est, Glom Filt Rate 02/21/2023 >60  >60 ml/min/1.73m2 Final       Narrative   NONCONTRAST SCREENING CT CHEST:       HISTORY: History of smoking. 45 pack year history of smoking. TECHNIQUE:    1 mm axial imaging of the chest and upper abdomen without IV contrast.        All CT scans at this facility use dose modulation, iterative reconstruction, and/or weight based dosing when appropriate to reduce the radiation dose to as low as reasonably achievable. COMPARISON: Screening chest CTs dated 1/14/2022 and 1/12/2021. FINDINGS:   LUNGS NODULES:   1. There is a 4 mm pleural-based nodule at the right lateral costophrenic angle, stable compared to prior CT. 2. There is stable small 4 mm nodule in the left upper lobe. LYMPHADENOPATHY:   1. There are no pathologically enlarged lymph nodes. OTHER (LUNGS/MEDIASTINUM/MUSCULOSKELETAL/ABDOMEN):   1. There is mild posterior dependent change at the right lung base. 2. There are mild emphysematous changes in the lungs. 3. There is stable coronary artery calcification. Impression   1. Stable small pulmonary nodules. 2. There are no pathologically enlarged lymph nodes. 3. LUNGRADS ASSESSMENT VALUE: 2,           The LUNG RADS RECOMMENDATIONS for monitoring lung nodules listed below (ACR- Lung-RADS Version 1.0 Assessment Categories)       LUNG RADS RECOMMENDATIONS;   1.  Normal, continue annual screening   2. Benign appearance or behavior, continue annual screening   3.  6 month CT recommended   4A.  3 month CT recommended; may consider PET/CT   4B. Additional diagnostics and/or tissue sampling recommended   4X. Additional diagnostics and/or tissue sampling recommended         **This report has been created using voice recognition software.   It may contain minor errors which are inherent in voice recognition technology. **       Final report electronically signed by Dr. Benny Duggan MD on 2/6/2023 9:45 AM       Narrative   PROCEDURE: CT ABDOMEN PELVIS W IV CONTRAST       CLINICAL INFORMATION: Abdominal pain, vomiting       TECHNIQUE: CT of the abdomen and pelvis was performed following administration of 80 mL Isovue-370 intravenous contrast only. Axial images as well as coronal and sagittal reconstructions were obtained. All CT scans at this facility use dose modulation, iterative reconstruction, and/or weight-based dosing when appropriate to reduce radiation dose to as low as reasonably achievable. COMPARISON: CTA abdomen and pelvis 8/4/2022       FINDINGS:        Lower thorax: Visualized lung bases are clear. There is no pleural effusion. Abdomen: There is no free intraperitoneal air or fluid. Bowel is normal in course and caliber without evidence of obstruction. Stable exophytic hypoattenuating lesions in the bilateral kidneys are likely cysts. The liver, gallbladder, pancreas, spleen    and adrenal glands are normal. Atherosclerotic calcifications are present in the abdominal aorta. An aneurysm of the infrarenal abdominal aorta is stable again measuring 3.5 cm in diameter (image 35). There is no mesenteric or retroperitoneal    lymphadenopathy. Degenerative changes are present in the lumbar spine without evidence of aggressive osseous lesions. Pelvis: The urinary bladder is incompletely distended. There are phleboliths in the pelvis. Calcifications in the uterus are likely fibroids. There is no pelvic or inguinal lymphadenopathy. Degenerative changes are present in the pelvis without evidence    of aggressive osseous lesions. Impression   1. No acute intra-abdominal or intrapelvic findings. 2. Stable aneurysm of the infrarenal abdominal aorta.        Final report electronically signed by Dr. Jason Bonner on 10/30/2022 1:21 PM       ASSESSMENT & PLAN  1. Weight loss, unintentional    Neg extensive w/u  Labs reassuring  Reassuring CT Chest/abd/pelvis  EGD about a year ago neg  Recent colo ok  Is current with GI  UTD on age related cancer screens  Suspect d/t stress of her husbands illness  Will monitor for now and have her improve her diet  Will reassess 4 wks    2. Chronic epigastric pain    Doing better with addition of Bentyl  Unclear etiology  ? Related to chronic pancreatitis  Con't bentyl and pantoprazole  Ok to hold on nerve block if pain better  F/u GI and pain if sxs return    3. Chronic pancreatitis, unspecified pancreatitis type ([address]    Stable from physio stand point  F/u GI    4. Gastroesophageal reflux disease without esophagitis    As above    5. Prediabetes    Con't TLC's  Stable    6. Cigarette nicotine dependence without complication    In precontemplation stage and not ready to quit. Declines cessation, aware of risks of continued smoking as well as resources available to help quit. These include tobacco cessation classes as well as 1-800-QUIT NOW. No barriers other than lack of desire. 3+ min spent counseling. DISPOSITION    Return in about 4 weeks (around 3/27/2023) for reassess weight loss, sooner as needed. Reginaldo Wu released with restrictions. Future Appointments   Date Time Provider Kimber Jacinto   3/27/2023 10:00 AM [address] [facility]     PATIENT COUNSELING    Counseling was provided today regarding the following topics: Healthy eating habits, Regular exercise, substance abuse and healthy sleep habits. Barriers to learning and self management: none    Discussed use, benefit, and side effects of prescribed medications. Barriers to medication compliance addressed. All patient questions answered. Pt voiced understanding.        Electronically signed by Gladys Matta DO on 2/27/2023 at 9:44 AM

## 2023-02-27 ENCOUNTER — OFFICE VISIT (OUTPATIENT)
Dept: FAMILY MEDICINE CLINIC | Age: 63
End: 2023-02-27
Payer: MEDICARE

## 2023-02-27 VITALS
SYSTOLIC BLOOD PRESSURE: 128 MMHG | HEART RATE: 74 BPM | WEIGHT: 160 LBS | RESPIRATION RATE: 16 BRPM | DIASTOLIC BLOOD PRESSURE: 68 MMHG | HEIGHT: 65 IN | TEMPERATURE: 98.2 F | BODY MASS INDEX: 26.66 KG/M2 | OXYGEN SATURATION: 98 %

## 2023-02-27 DIAGNOSIS — F17.210 CIGARETTE NICOTINE DEPENDENCE WITHOUT COMPLICATION: ICD-10-CM

## 2023-02-27 DIAGNOSIS — R10.13 CHRONIC EPIGASTRIC PAIN: ICD-10-CM

## 2023-02-27 DIAGNOSIS — K21.9 GASTROESOPHAGEAL REFLUX DISEASE WITHOUT ESOPHAGITIS: ICD-10-CM

## 2023-02-27 DIAGNOSIS — G89.29 CHRONIC EPIGASTRIC PAIN: ICD-10-CM

## 2023-02-27 DIAGNOSIS — K86.1 CHRONIC PANCREATITIS, UNSPECIFIED PANCREATITIS TYPE (HCC): ICD-10-CM

## 2023-02-27 DIAGNOSIS — R63.4 WEIGHT LOSS, UNINTENTIONAL: Primary | ICD-10-CM

## 2023-02-27 DIAGNOSIS — R73.03 PREDIABETES: ICD-10-CM

## 2023-02-27 PROCEDURE — 3074F SYST BP LT 130 MM HG: CPT | Performed by: FAMILY MEDICINE

## 2023-02-27 PROCEDURE — 3017F COLORECTAL CA SCREEN DOC REV: CPT | Performed by: FAMILY MEDICINE

## 2023-02-27 PROCEDURE — 3078F DIAST BP <80 MM HG: CPT | Performed by: FAMILY MEDICINE

## 2023-02-27 PROCEDURE — G8417 CALC BMI ABV UP PARAM F/U: HCPCS | Performed by: FAMILY MEDICINE

## 2023-02-27 PROCEDURE — G8427 DOCREV CUR MEDS BY ELIG CLIN: HCPCS | Performed by: FAMILY MEDICINE

## 2023-02-27 PROCEDURE — 99214 OFFICE O/P EST MOD 30 MIN: CPT | Performed by: FAMILY MEDICINE

## 2023-02-27 PROCEDURE — 4004F PT TOBACCO SCREEN RCVD TLK: CPT | Performed by: FAMILY MEDICINE

## 2023-02-27 PROCEDURE — G8482 FLU IMMUNIZE ORDER/ADMIN: HCPCS | Performed by: FAMILY MEDICINE

## 2023-02-27 SDOH — ECONOMIC STABILITY: HOUSING INSECURITY
IN THE LAST 12 MONTHS, WAS THERE A TIME WHEN YOU DID NOT HAVE A STEADY PLACE TO SLEEP OR SLEPT IN A SHELTER (INCLUDING NOW)?: NO

## 2023-02-27 SDOH — ECONOMIC STABILITY: FOOD INSECURITY: WITHIN THE PAST 12 MONTHS, YOU WORRIED THAT YOUR FOOD WOULD RUN OUT BEFORE YOU GOT MONEY TO BUY MORE.: NEVER TRUE

## 2023-02-27 SDOH — ECONOMIC STABILITY: INCOME INSECURITY: HOW HARD IS IT FOR YOU TO PAY FOR THE VERY BASICS LIKE FOOD, HOUSING, MEDICAL CARE, AND HEATING?: NOT HARD AT ALL

## 2023-02-27 SDOH — ECONOMIC STABILITY: FOOD INSECURITY: WITHIN THE PAST 12 MONTHS, THE FOOD YOU BOUGHT JUST DIDN'T LAST AND YOU DIDN'T HAVE MONEY TO GET MORE.: NEVER TRUE

## 2023-03-01 DIAGNOSIS — F33.42 RECURRENT MAJOR DEPRESSIVE DISORDER, IN FULL REMISSION (HCC): ICD-10-CM

## 2023-03-01 DIAGNOSIS — I10 ESSENTIAL HYPERTENSION: ICD-10-CM

## 2023-03-01 RX ORDER — AMLODIPINE BESYLATE 10 MG/1
TABLET ORAL
Qty: 90 TABLET | Refills: 3 | Status: SHIPPED | OUTPATIENT
Start: 2023-03-01

## 2023-03-01 RX ORDER — ESCITALOPRAM OXALATE 10 MG/1
10 TABLET ORAL DAILY
Qty: 90 TABLET | Refills: 3 | Status: SHIPPED | OUTPATIENT
Start: 2023-03-01

## 2023-03-01 NOTE — TELEPHONE ENCOUNTER
Recent Visits  Date Type Provider Dept   02/27/23 Office Visit Titus Iqbal, DO [facility]   01/30/23 Office Visit Titus Iqbal, DO [facility]   11/08/22 Office Visit Titus Iqbal, DO [facility]   06/16/22 Office Visit Titus Iqbal, DO [facility]   03/21/22 Office Visit Titus Iqbal, DO [facility]   03/18/22 Office Visit Titus Iqbal, DO [facility]   12/08/21 Office Visit Titus Iqbal, DO [facility]   Showing recent visits within past 540 days with a meds authorizing provider and meeting all other requirements  Future Appointments  Date Type Provider Dept   03/27/23 Appointment Titus Iqbal, DO [facility]   Showing future appointments within next 150 days with a meds authorizing provider and meeting all other requirements

## 2023-04-04 NOTE — PROGRESS NOTES
lymph nodes. Bones : Moderate degenerative facet arthropathy lower lumbar spine. Mild degenerative changes both hips. No evidence for acute fracture or bone destruction. Pelvis: Urinary bladder normal in appearance. No mass lesion. No adenopathy. No free air or free fluid. There are a few small calcifications in the uterus, and mild soft tissue fullness along the anterior right side of the uterine fundus, likely related    to fibroid uterus. Vascular: Distal descending thoracic aorta unremarkable. Celiac artery and SMA widely patent. Mild stenosis at the origin right renal artery. 60-70% stenosis at the origin of the left renal artery. Retroaortic renal vein left side. 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 The pelvic vascularity is unremarkable. Impression   1. Fibroid uterus. Findings of constipation. Benign-appearing cyst left kidney. Moderate degenerative facet disease lower lumbar spine. 2. There is a 3.5 cm fusiform aneurysm infrarenal abdominal aorta. No dissection. Celiac artery and SMA widely patent. Mild stenosis origin right renal artery. 60-70% stenosis left renal artery. **This report has been created using voice recognition software. It may contain minor errors which are inherent in voice recognition technology. **       Final report electronically signed by Dr. Norma Zarate on 8/4/2022 11:12 AM       ASSESSMENT & PLAN  1. Weight loss, unintentional    Neg extensive w/u  All appears tied to her abd pain  Unclear if from her pancreatitis or other  Was setup for nerve block, but she now declines this  Chikis and bentyl help a little  Wanted to get her to [facility] or [facility], but declines  Will have her see Dr. Omaira Rapp here locally  Will reassess her in 4 wks    - Ricky Whaley MD, Gastroenterology, [facility] [place] [address]    2.  Chronic epigastric pain    As above  Con't bentyl, chikis, and pantoprazole/pepcid    - [facility] - Mary Cantu,

## 2023-04-05 ENCOUNTER — OFFICE VISIT (OUTPATIENT)
Dept: FAMILY MEDICINE CLINIC | Age: 63
End: 2023-04-05
Payer: MEDICARE

## 2023-04-05 VITALS
DIASTOLIC BLOOD PRESSURE: 60 MMHG | BODY MASS INDEX: 25.92 KG/M2 | RESPIRATION RATE: 16 BRPM | HEART RATE: 79 BPM | HEIGHT: 65 IN | SYSTOLIC BLOOD PRESSURE: 120 MMHG | OXYGEN SATURATION: 93 % | WEIGHT: 155.6 LBS | TEMPERATURE: 98 F

## 2023-04-05 DIAGNOSIS — F17.210 CIGARETTE NICOTINE DEPENDENCE WITHOUT COMPLICATION: ICD-10-CM

## 2023-04-05 DIAGNOSIS — K21.9 GASTROESOPHAGEAL REFLUX DISEASE WITHOUT ESOPHAGITIS: ICD-10-CM

## 2023-04-05 DIAGNOSIS — G89.29 CHRONIC EPIGASTRIC PAIN: ICD-10-CM

## 2023-04-05 DIAGNOSIS — R63.4 WEIGHT LOSS, UNINTENTIONAL: Primary | ICD-10-CM

## 2023-04-05 DIAGNOSIS — R10.13 CHRONIC EPIGASTRIC PAIN: ICD-10-CM

## 2023-04-05 DIAGNOSIS — K86.1 CHRONIC PANCREATITIS, UNSPECIFIED PANCREATITIS TYPE (HCC): ICD-10-CM

## 2023-04-05 PROCEDURE — 3074F SYST BP LT 130 MM HG: CPT | Performed by: FAMILY MEDICINE

## 2023-04-05 PROCEDURE — 4004F PT TOBACCO SCREEN RCVD TLK: CPT | Performed by: FAMILY MEDICINE

## 2023-04-05 PROCEDURE — 3017F COLORECTAL CA SCREEN DOC REV: CPT | Performed by: FAMILY MEDICINE

## 2023-04-05 PROCEDURE — 3078F DIAST BP <80 MM HG: CPT | Performed by: FAMILY MEDICINE

## 2023-04-05 PROCEDURE — G8427 DOCREV CUR MEDS BY ELIG CLIN: HCPCS | Performed by: FAMILY MEDICINE

## 2023-04-05 PROCEDURE — 99214 OFFICE O/P EST MOD 30 MIN: CPT | Performed by: FAMILY MEDICINE

## 2023-04-05 PROCEDURE — G8417 CALC BMI ABV UP PARAM F/U: HCPCS | Performed by: FAMILY MEDICINE

## 2023-04-05 NOTE — PATIENT INSTRUCTIONS
LAB INSTRUCTIONS:    Please complete labs within 1 week(s). Please fast for 8 hours prior to lab collection. The clinic will call you within 1 week of collection. If you have not heard from us within that amount of time, please call us at [phone].

## 2023-05-28 DIAGNOSIS — G47.00 INSOMNIA, UNSPECIFIED TYPE: ICD-10-CM

## 2023-05-30 RX ORDER — TRAZODONE HYDROCHLORIDE 50 MG/1
TABLET ORAL
Qty: 90 TABLET | Refills: 3 | Status: SHIPPED | OUTPATIENT
Start: 2023-05-30

## 2023-08-24 DIAGNOSIS — I69.30 HISTORY OF STROKE WITH RESIDUAL DEFICIT: Chronic | ICD-10-CM

## 2023-08-24 DIAGNOSIS — K21.9 GASTROESOPHAGEAL REFLUX DISEASE WITHOUT ESOPHAGITIS: Chronic | ICD-10-CM

## 2023-08-24 RX ORDER — CLOPIDOGREL BISULFATE 75 MG/1
TABLET ORAL
Qty: 90 TABLET | Refills: 3 | Status: SHIPPED | OUTPATIENT
Start: 2023-08-24

## 2023-08-24 RX ORDER — FAMOTIDINE 20 MG/1
TABLET, FILM COATED ORAL
Qty: 180 TABLET | Refills: 3 | Status: SHIPPED | OUTPATIENT
Start: 2023-08-24

## 2023-08-24 NOTE — TELEPHONE ENCOUNTER
Recent Visits  Date Type Provider Dept   04/05/23 Office Visit Hermelinda Munoz, DO [facility]   02/27/23 Office Visit Hermelinda Munoz, DO [facility]   01/30/23 Office Visit Hermelinda Munoz, DO [facility]   11/08/22 Office Visit Hermelinda Munoz, DO [facility]   06/16/22 Office Visit Hermelinda Munoz, DO [facility]   03/21/22 Office Visit Hermelinda Munoz, DO [facility]   03/18/22 Office Visit Hermelinda Munoz, DO [facility]   Showing recent visits within past 540 days with a meds authorizing provider and meeting all other requirements  Future Appointments  No visits were found meeting these conditions.   Showing future appointments within next 150 days with a meds authorizing provider and meeting all other requirements

## 2023-08-24 NOTE — TELEPHONE ENCOUNTER
Recent Visits  Date Type Provider Dept   04/05/23 Office Visit Nick Brar, DO [facility]   02/27/23 Office Visit Nick Brar, DO [facility]   01/30/23 Office Visit Nick Brar, DO [facility]   11/08/22 Office Visit Nick Brar, DO [facility]   06/16/22 Office Visit Nick Brar, DO [facility]   03/21/22 Office Visit Nick Brar, DO [facility]   03/18/22 Office Visit Nick Brar, DO [facility]   Showing recent visits within past 540 days with a meds authorizing provider and meeting all other requirements  Future Appointments  No visits were found meeting these conditions.   Showing future appointments within next 150 days with a meds authorizing provider and meeting all other requirements

## 2023-08-30 ENCOUNTER — OFFICE VISIT (OUTPATIENT)
Dept: FAMILY MEDICINE CLINIC | Age: 63
End: 2023-08-30

## 2023-08-30 VITALS
RESPIRATION RATE: 14 BRPM | WEIGHT: 150 LBS | DIASTOLIC BLOOD PRESSURE: 52 MMHG | TEMPERATURE: 98.3 F | HEIGHT: 64 IN | HEART RATE: 78 BPM | SYSTOLIC BLOOD PRESSURE: 128 MMHG | BODY MASS INDEX: 25.61 KG/M2

## 2023-08-30 DIAGNOSIS — R11.0 CHRONIC NAUSEA: ICD-10-CM

## 2023-08-30 DIAGNOSIS — M79.18 MUSCULOSKELETAL PAIN: ICD-10-CM

## 2023-08-30 DIAGNOSIS — R63.4 WEIGHT LOSS, UNINTENTIONAL: ICD-10-CM

## 2023-08-30 DIAGNOSIS — I44.7 LEFT BUNDLE BRANCH BLOCK: ICD-10-CM

## 2023-08-30 DIAGNOSIS — Z72.0 TOBACCO ABUSE: ICD-10-CM

## 2023-08-30 DIAGNOSIS — T14.8XXA MUSCLE STRAIN: Primary | ICD-10-CM

## 2023-08-30 RX ORDER — TRAMADOL HYDROCHLORIDE 50 MG/1
50 TABLET ORAL EVERY 4 HOURS PRN
Qty: 20 TABLET | Refills: 0 | Status: SHIPPED | OUTPATIENT
Start: 2023-08-30 | End: 2023-09-04

## 2023-08-30 RX ORDER — VARENICLINE TARTRATE
1 KIT DAILY
Qty: 53 EACH | Refills: 0 | Status: SHIPPED | OUTPATIENT
Start: 2023-08-30

## 2023-08-30 RX ORDER — VARENICLINE TARTRATE 1 MG/1
1 TABLET, FILM COATED ORAL 2 TIMES DAILY
Qty: 60 TABLET | Refills: 3 | Status: SHIPPED | OUTPATIENT
Start: 2023-08-30

## 2023-08-30 ASSESSMENT — ENCOUNTER SYMPTOMS
RECTAL PAIN: 0
VOMITING: 0
BACK PAIN: 0
ABDOMINAL PAIN: 0
RESPIRATORY NEGATIVE: 1
DIARRHEA: 0
CONSTIPATION: 0
ABDOMINAL DISTENTION: 0
NAUSEA: 1

## 2023-08-30 NOTE — PROGRESS NOTES
[address] [facility] MEDICINE  [address] [place] [address] DR. [place] [address]  Dept: [phone]  Dept Fax: [phone]  Loc: [phone]    Roro Kelsey is a 57 y.o. femalewho presents today for her medical conditions/complaints as noted below. [place] c/o of Abdominal Pain (Nausea-for 3 years), Weight Loss, and Pain (Left side/ and breast area)      : HPI    Pt here to discuss a few concerns. Pt states she has been having left lateral chest pain for the last 4 days  It comes and goes  It started after she slept in her bedroom floor  Denies any injury   Describes it as an ache, denies Nausea or diaphoresis with it  Denies any pain radiating down her left arm or up to her jaw. Did have a stress test in 2018 that was  normal, results reviewed  Echo 2017 results reviewed  EKG today with new onset LBBB      Pt also admits to unintentional weight loss  Seen in April for this also. States she  has chronic nausea it comes and goes throughout the day. No fever or vomiting or black tarry stools or diarrhea.    Eating less due to this   Denies abdominal pain  Had an EGD 4/2022 results reviewed, noted to have gastritis  Pt on a PPI not helping with nausea and denies GERD  Need to consider gastric emptying study  Pt had a colonoscopy  2022 had 2 polyps removed on 5 year recall list.     Wt Readings from Last 3 Encounters:   08/30/23 150 lb (68 kg)   04/05/23 155 lb 9.6 oz (70.6 kg)   02/27/23 160 lb (72.6 kg)      Recent mammogram 2.2023  with stable nodular density of left outer breast. .     Current Outpatient Medications   Medication Sig Dispense Refill    varenicline (CHANTIX CONTINUING MONTH PAK) 1 MG tablet Take 1 tablet by mouth 2 times daily 60 tablet 3    Varenicline Tartrate, Starter, (CHANTIX STARTING MONTH MARISOL) 0.5 MG X 11 & 1 MG X 42 TBPK Take 1 each by mouth daily 53 each 0    traMADol (ULTRAM) 50 MG tablet Take 1 tablet by mouth every 4 hours as

## 2023-09-13 ENCOUNTER — HOSPITAL ENCOUNTER (OUTPATIENT)
Dept: NON INVASIVE DIAGNOSTICS | Age: 63
Discharge: HOME | End: 2023-09-13
Payer: MEDICARE

## 2023-09-13 ENCOUNTER — TELEPHONE (OUTPATIENT)
Dept: FAMILY MEDICINE CLINIC | Age: 63
End: 2023-09-13

## 2023-09-13 ENCOUNTER — HOSPITAL ENCOUNTER (OUTPATIENT)
Age: 63
Discharge: HOME | End: 2023-09-13
Payer: MEDICARE

## 2023-09-13 DIAGNOSIS — R63.4 WEIGHT LOSS, UNINTENTIONAL: ICD-10-CM

## 2023-09-13 DIAGNOSIS — I44.7 LEFT BUNDLE BRANCH BLOCK: ICD-10-CM

## 2023-09-13 DIAGNOSIS — R11.0 CHRONIC NAUSEA: ICD-10-CM

## 2023-09-13 LAB
BASOPHILS ABSOLUTE: 0 THOU/MM3 (ref 0–0.1)
BASOPHILS NFR BLD AUTO: 0.6 %
DEPRECATED RDW RBC AUTO: 46.9 FL (ref 35–45)
EOSINOPHIL NFR BLD AUTO: 0.9 %
EOSINOPHILS ABSOLUTE: 0.1 THOU/MM3 (ref 0–0.4)
ERYTHROCYTE [DISTWIDTH] IN BLOOD BY AUTOMATED COUNT: 13.6 % (ref 11.5–14.5)
HCT VFR BLD AUTO: 48.4 % (ref 37–47)
HGB BLD-MCNC: 15.3 GM/DL (ref 12–16)
IMM GRANULOCYTES # BLD AUTO: 0.02 THOU/MM3 (ref 0–0.07)
IMM GRANULOCYTES NFR BLD AUTO: 0.3 %
LYMPHOCYTES ABSOLUTE: 2.3 THOU/MM3 (ref 1–4.8)
LYMPHOCYTES NFR BLD AUTO: 34.4 %
MCH RBC QN AUTO: 29.7 PG (ref 26–33)
MCHC RBC AUTO-ENTMCNC: 31.6 GM/DL (ref 32.2–35.5)
MCV RBC AUTO: 94 FL (ref 81–99)
MONOCYTES ABSOLUTE: 0.6 THOU/MM3 (ref 0.4–1.3)
MONOCYTES NFR BLD AUTO: 9.1 %
NEUTROPHILS NFR BLD AUTO: 54.7 %
NRBC BLD AUTO-RTO: 0 /100 WBC
PLATELET # BLD AUTO: 268 THOU/MM3 (ref 130–400)
PMV BLD AUTO: 10.6 FL (ref 9.4–12.4)
RBC # BLD AUTO: 5.15 MILL/MM3 (ref 4.2–5.4)
SEGMENTED NEUTROPHILS ABSOLUTE COUNT: 3.6 THOU/MM3 (ref 1.8–7.7)
WBC # BLD AUTO: 6.6 THOU/MM3 (ref 4.8–10.8)

## 2023-09-13 PROCEDURE — 36415 COLL VENOUS BLD VENIPUNCTURE: CPT

## 2023-09-13 PROCEDURE — 85025 COMPLETE CBC W/AUTO DIFF WBC: CPT

## 2023-09-13 PROCEDURE — 93306 TTE W/DOPPLER COMPLETE: CPT

## 2023-09-13 NOTE — TELEPHONE ENCOUNTER
Called patient and informed of lab results. Patient verbalized understanding  Patient states seen GI on 09/06/23 and they ordered a GI emptying study. Patient states has not been in contact to get this set up. Gave patient GI's number to see what was going on with this study so she can get this completed. Patient states she is feeling okay just having stomach cramping which she states hasn't changed.

## 2023-09-13 NOTE — TELEPHONE ENCOUNTER
----- Message from MADAI Simon CNP sent at 9/13/2023  2:45 PM EDT -----  Let pt know her cbc is in normal range,  no anemia noted ,as can be seen with weight loss, or a gastric bleed,  ask her if she has f/u with GI yet for the nausea and weight loss. How has she been feeling?

## 2023-09-14 ENCOUNTER — TELEPHONE (OUTPATIENT)
Dept: FAMILY MEDICINE CLINIC | Age: 63
End: 2023-09-14

## 2023-09-14 NOTE — TELEPHONE ENCOUNTER
----- Message from MADAI Romano CNP sent at 9/14/2023 12:56 PM EDT -----  Let pt know her echo identified      Ejection fraction (heart output) is visually estimated at 55%. Which is in normal range,     Overall left ventricular function is normal.   The aortic valve leaflets were not well visualized. Thickened aortic valve leaflets noted. Aortic valve leaflets are Moderately calcified. Mild aortic stenosis is present. This means the heart valve is slightly narrowed but still functioning well. Moderate aortic regurgitation is noted. The aortic valve that is stenotic (narrowed) is at a mild level but could or could not progress in the future. Repeat echo in the future will help monitor this condition. Let me know if she has any questions.

## 2023-10-10 ENCOUNTER — HOSPITAL ENCOUNTER (OUTPATIENT)
Dept: NUCLEAR MEDICINE | Age: 63
Discharge: HOME | End: 2023-10-10
Payer: MEDICARE

## 2023-10-10 DIAGNOSIS — G89.29 CHRONIC EPIGASTRIC PAIN: ICD-10-CM

## 2023-10-10 DIAGNOSIS — R68.81 EARLY SATIETY: ICD-10-CM

## 2023-10-10 DIAGNOSIS — R11.0 CHRONIC NAUSEA: ICD-10-CM

## 2023-10-10 DIAGNOSIS — R11.10 INTERMITTENT VOMITING: ICD-10-CM

## 2023-10-10 DIAGNOSIS — R10.13 CHRONIC EPIGASTRIC PAIN: ICD-10-CM

## 2023-10-10 DIAGNOSIS — R63.4 WEIGHT LOSS, UNINTENTIONAL: ICD-10-CM

## 2023-10-10 PROCEDURE — 78264 GASTRIC EMPTYING IMG STUDY: CPT

## 2023-10-10 PROCEDURE — A9541 TC99M SULFUR COLLOID: HCPCS | Performed by: NURSE PRACTITIONER

## 2023-10-10 PROCEDURE — 3430000000 HC RX DIAGNOSTIC RADIOPHARMACEUTICAL: Performed by: NURSE PRACTITIONER

## 2023-10-10 RX ADMIN — Medication 1 MILLICURIE: at 08:50

## 2023-11-19 DIAGNOSIS — I69.30 HISTORY OF STROKE WITH RESIDUAL DEFICIT: ICD-10-CM

## 2023-11-19 DIAGNOSIS — E78.5 DYSLIPIDEMIA: ICD-10-CM

## 2023-11-20 RX ORDER — ATORVASTATIN CALCIUM 80 MG/1
TABLET, FILM COATED ORAL
Qty: 90 TABLET | Refills: 3 | Status: SHIPPED | OUTPATIENT
Start: 2023-11-20

## 2023-11-20 RX ORDER — FOLIC ACID 1 MG/1
TABLET ORAL
Qty: 90 TABLET | Refills: 3 | Status: SHIPPED | OUTPATIENT
Start: 2023-11-20

## 2023-11-20 NOTE — TELEPHONE ENCOUNTER
Recent Visits  Date Type Provider Dept   08/30/23 Office Visit MADAI Ashton - CNP [facility]   04/05/23 Office Visit Sanjay Oliva, DO [facility]   02/27/23 Office Visit Sanjay Oliva, DO [facility]   01/30/23 Office Visit Sanjay Oliva, DO [facility]   11/08/22 Office Visit Sanjay Oliva, DO [facility]   06/16/22 Office Visit Barbaraodell Hazel, DO [facility]   Showing recent visits within past 540 days with a meds authorizing provider and meeting all other requirements  Future Appointments  No visits were found meeting these conditions.   Showing future appointments within next 150 days with a meds authorizing provider and meeting all other requirements

## 2024-02-11 DIAGNOSIS — F33.42 RECURRENT MAJOR DEPRESSIVE DISORDER, IN FULL REMISSION (HCC): ICD-10-CM

## 2024-02-11 DIAGNOSIS — I10 ESSENTIAL HYPERTENSION: ICD-10-CM

## 2024-02-12 RX ORDER — ESCITALOPRAM OXALATE 10 MG/1
10 TABLET ORAL DAILY
Qty: 90 TABLET | Refills: 0 | Status: SHIPPED | OUTPATIENT
Start: 2024-02-12

## 2024-02-12 RX ORDER — AMLODIPINE BESYLATE 10 MG/1
TABLET ORAL
Qty: 90 TABLET | Refills: 0 | Status: SHIPPED | OUTPATIENT
Start: 2024-02-12

## 2024-02-12 NOTE — TELEPHONE ENCOUNTER
Recent Visits  Date Type Provider Dept   08/30/23 Office Visit Khadar Moyer, APRN - CNP [facility]   04/05/23 Office Visit Amador Miles, DO [facility]   02/27/23 Office Visit Amador Miles, DO [facility]   01/30/23 Office Visit Amador Miles, DO [facility]   11/08/22 Office Visit Amador Miles, DO [facility]   Showing recent visits within past 540 days with a meds authorizing provider and meeting all other requirements  Future Appointments  No visits were found meeting these conditions.  Showing future appointments within next 150 days with a meds authorizing provider and meeting all other requirements

## 2024-03-27 ENCOUNTER — APPOINTMENT (OUTPATIENT)
Dept: GENERAL RADIOLOGY | Age: 64
DRG: 190 | End: 2024-03-27
Payer: MEDICARE

## 2024-03-27 ENCOUNTER — HOSPITAL ENCOUNTER (INPATIENT)
Age: 64
LOS: 2 days | Discharge: HOME | DRG: 190 | End: 2024-03-29
Attending: EMERGENCY MEDICINE
Payer: MEDICARE

## 2024-03-27 DIAGNOSIS — J44.1 COPD EXACERBATION (HCC): Primary | ICD-10-CM

## 2024-03-27 DIAGNOSIS — F17.210 SMOKING GREATER THAN 20 PACK YEARS: ICD-10-CM

## 2024-03-27 DIAGNOSIS — J96.01 ACUTE RESPIRATORY FAILURE WITH HYPOXIA (HCC): ICD-10-CM

## 2024-03-27 DIAGNOSIS — J44.9 CHRONIC OBSTRUCTIVE PULMONARY DISEASE, UNSPECIFIED COPD TYPE (HCC): Chronic | ICD-10-CM

## 2024-03-27 LAB
ANION GAP SERPL CALC-SCNC: 14 MEQ/L (ref 8–16)
ARTERIAL PATENCY WRIST A: POSITIVE
BASE EXCESS BLDA CALC-SCNC: 0.7 MMOL/L (ref -2.5–2.5)
BASOPHILS ABSOLUTE: 0 THOU/MM3 (ref 0–0.1)
BASOPHILS NFR BLD AUTO: 0.3 %
BDY SITE: ABNORMAL
BUN SERPL-MCNC: 12 MG/DL (ref 7–22)
CALCIUM SERPL-MCNC: 9.3 MG/DL (ref 8.5–10.5)
CHLORIDE SERPL-SCNC: 105 MEQ/L (ref 98–111)
CO2 SERPL-SCNC: 24 MEQ/L (ref 23–33)
COLLECTED BY:: ABNORMAL
CREAT SERPL-MCNC: 0.9 MG/DL (ref 0.4–1.2)
DEPRECATED RDW RBC AUTO: 48.8 FL (ref 35–45)
DEVICE: ABNORMAL
EOSINOPHIL NFR BLD AUTO: 0.6 %
EOSINOPHILS ABSOLUTE: 0 THOU/MM3 (ref 0–0.4)
ERYTHROCYTE [DISTWIDTH] IN BLOOD BY AUTOMATED COUNT: 14.5 % (ref 11.5–14.5)
FIO2 ON VENT O2 ANALYZER: 1 %
FLUAV RNA RESP QL NAA+PROBE: NOT DETECTED
FLUBV RNA RESP QL NAA+PROBE: NOT DETECTED
GFR SERPL CREATININE-BSD FRML MDRD: 71 ML/MIN/1.73M2
GLUCOSE SERPL-MCNC: 120 MG/DL (ref 70–108)
HCO3 BLDA-SCNC: 26 MMOL/L (ref 23–28)
HCT VFR BLD AUTO: 54.1 % (ref 37–47)
HGB BLD-MCNC: 17.9 GM/DL (ref 12–16)
IMM GRANULOCYTES # BLD AUTO: 0.01 THOU/MM3 (ref 0–0.07)
IMM GRANULOCYTES NFR BLD AUTO: 0.1 %
LYMPHOCYTES ABSOLUTE: 3.1 THOU/MM3 (ref 1–4.8)
LYMPHOCYTES NFR BLD AUTO: 42.5 %
MCH RBC QN AUTO: 30.7 PG (ref 26–33)
MCHC RBC AUTO-ENTMCNC: 33.1 GM/DL (ref 32.2–35.5)
MCV RBC AUTO: 92.8 FL (ref 81–99)
MONOCYTES ABSOLUTE: 0.7 THOU/MM3 (ref 0.4–1.3)
MONOCYTES NFR BLD AUTO: 10.2 %
NEUTROPHILS NFR BLD AUTO: 46.3 %
NRBC BLD AUTO-RTO: 0 /100 WBC
NT-PROBNP SERPL IA-MCNC: 199 PG/ML (ref 0–124)
OSMOLALITY SERPL CALC.SUM OF ELEC: 285.9 MOSMOL/KG (ref 275–300)
PCO2 BLDA: 44 MMHG (ref 35–45)
PH BLDA: 7.39 [PH] (ref 7.35–7.45)
PLATELET # BLD AUTO: 209 THOU/MM3 (ref 130–400)
PMV BLD AUTO: 11.9 FL (ref 9.4–12.4)
PO2 BLDA: 64 MMHG (ref 71–104)
POTASSIUM SERPL-SCNC: 3.9 MEQ/L (ref 3.5–5.2)
PROCALCITONIN SERPL IA-MCNC: 0.02 NG/ML (ref 0.01–0.09)
RBC # BLD AUTO: 5.83 MILL/MM3 (ref 4.2–5.4)
SAO2 % BLDA: 92 %
SARS-COV-2 RNA RESP QL NAA+PROBE: NOT DETECTED
SEGMENTED NEUTROPHILS ABSOLUTE COUNT: 3.3 THOU/MM3 (ref 1.8–7.7)
SODIUM SERPL-SCNC: 143 MEQ/L (ref 135–145)
TROPONIN, HIGH SENSITIVITY: 9 NG/L (ref 0–12)
WBC # BLD AUTO: 7.2 THOU/MM3 (ref 4.8–10.8)

## 2024-03-27 PROCEDURE — 94761 N-INVAS EAR/PLS OXIMETRY MLT: CPT

## 2024-03-27 PROCEDURE — 2700000000 HC OXYGEN THERAPY PER DAY

## 2024-03-27 PROCEDURE — 83880 ASSAY OF NATRIURETIC PEPTIDE: CPT

## 2024-03-27 PROCEDURE — 36600 WITHDRAWAL OF ARTERIAL BLOOD: CPT

## 2024-03-27 PROCEDURE — 6370000000 HC RX 637 (ALT 250 FOR IP): Performed by: STUDENT IN AN ORGANIZED HEALTH CARE EDUCATION/TRAINING PROGRAM

## 2024-03-27 PROCEDURE — 99223 1ST HOSP IP/OBS HIGH 75: CPT | Performed by: INTERNAL MEDICINE

## 2024-03-27 PROCEDURE — 87636 SARSCOV2 & INF A&B AMP PRB: CPT

## 2024-03-27 PROCEDURE — 71046 X-RAY EXAM CHEST 2 VIEWS: CPT

## 2024-03-27 PROCEDURE — 93010 ELECTROCARDIOGRAM REPORT: CPT | Performed by: INTERNAL MEDICINE

## 2024-03-27 PROCEDURE — 6360000002 HC RX W HCPCS: Performed by: EMERGENCY MEDICINE

## 2024-03-27 PROCEDURE — 84145 PROCALCITONIN (PCT): CPT

## 2024-03-27 PROCEDURE — 99285 EMERGENCY DEPT VISIT HI MDM: CPT

## 2024-03-27 PROCEDURE — 2580000003 HC RX 258: Performed by: EMERGENCY MEDICINE

## 2024-03-27 PROCEDURE — 96374 THER/PROPH/DIAG INJ IV PUSH: CPT

## 2024-03-27 PROCEDURE — 2580000003 HC RX 258: Performed by: STUDENT IN AN ORGANIZED HEALTH CARE EDUCATION/TRAINING PROGRAM

## 2024-03-27 PROCEDURE — 80048 BASIC METABOLIC PNL TOTAL CA: CPT

## 2024-03-27 PROCEDURE — 84484 ASSAY OF TROPONIN QUANT: CPT

## 2024-03-27 PROCEDURE — 94669 MECHANICAL CHEST WALL OSCILL: CPT

## 2024-03-27 PROCEDURE — 93005 ELECTROCARDIOGRAM TRACING: CPT | Performed by: EMERGENCY MEDICINE

## 2024-03-27 PROCEDURE — 85025 COMPLETE CBC W/AUTO DIFF WBC: CPT

## 2024-03-27 PROCEDURE — 1200000003 HC TELEMETRY R&B

## 2024-03-27 PROCEDURE — 94640 AIRWAY INHALATION TREATMENT: CPT

## 2024-03-27 PROCEDURE — 82803 BLOOD GASES ANY COMBINATION: CPT

## 2024-03-27 PROCEDURE — 6360000002 HC RX W HCPCS: Performed by: STUDENT IN AN ORGANIZED HEALTH CARE EDUCATION/TRAINING PROGRAM

## 2024-03-27 PROCEDURE — 36415 COLL VENOUS BLD VENIPUNCTURE: CPT

## 2024-03-27 RX ORDER — ENOXAPARIN SODIUM 100 MG/ML
40 INJECTION SUBCUTANEOUS DAILY
Status: DISCONTINUED | OUTPATIENT
Start: 2024-03-27 | End: 2024-03-29 | Stop reason: HOSPADM

## 2024-03-27 RX ORDER — POLYETHYLENE GLYCOL 3350 17 G/17G
17 POWDER, FOR SOLUTION ORAL DAILY PRN
Status: DISCONTINUED | OUTPATIENT
Start: 2024-03-27 | End: 2024-03-29 | Stop reason: HOSPADM

## 2024-03-27 RX ORDER — CLOPIDOGREL BISULFATE 75 MG/1
75 TABLET ORAL DAILY
Status: DISCONTINUED | OUTPATIENT
Start: 2024-03-27 | End: 2024-03-29 | Stop reason: HOSPADM

## 2024-03-27 RX ORDER — FOLIC ACID 1 MG/1
1000 TABLET ORAL DAILY
Status: DISCONTINUED | OUTPATIENT
Start: 2024-03-27 | End: 2024-03-29 | Stop reason: HOSPADM

## 2024-03-27 RX ORDER — ATORVASTATIN CALCIUM 80 MG/1
80 TABLET, FILM COATED ORAL NIGHTLY
Status: DISCONTINUED | OUTPATIENT
Start: 2024-03-27 | End: 2024-03-29 | Stop reason: HOSPADM

## 2024-03-27 RX ORDER — FAMOTIDINE 20 MG/1
20 TABLET, FILM COATED ORAL 2 TIMES DAILY
Status: DISCONTINUED | OUTPATIENT
Start: 2024-03-27 | End: 2024-03-29 | Stop reason: HOSPADM

## 2024-03-27 RX ORDER — SODIUM CHLORIDE 0.9 % (FLUSH) 0.9 %
10 SYRINGE (ML) INJECTION PRN
Status: DISCONTINUED | OUTPATIENT
Start: 2024-03-27 | End: 2024-03-29 | Stop reason: HOSPADM

## 2024-03-27 RX ORDER — IPRATROPIUM BROMIDE AND ALBUTEROL SULFATE 2.5; .5 MG/3ML; MG/3ML
1 SOLUTION RESPIRATORY (INHALATION)
Status: DISCONTINUED | OUTPATIENT
Start: 2024-03-27 | End: 2024-03-28

## 2024-03-27 RX ORDER — GLUCAGON 1 MG/ML
1 KIT INJECTION PRN
Status: DISCONTINUED | OUTPATIENT
Start: 2024-03-27 | End: 2024-03-29 | Stop reason: HOSPADM

## 2024-03-27 RX ORDER — ALBUTEROL SULFATE 2.5 MG/3ML
2.5 SOLUTION RESPIRATORY (INHALATION) ONCE
Status: COMPLETED | OUTPATIENT
Start: 2024-03-27 | End: 2024-03-27

## 2024-03-27 RX ORDER — TRAZODONE HYDROCHLORIDE 50 MG/1
50 TABLET ORAL NIGHTLY
Status: DISCONTINUED | OUTPATIENT
Start: 2024-03-27 | End: 2024-03-29 | Stop reason: HOSPADM

## 2024-03-27 RX ORDER — ALBUTEROL SULFATE 2.5 MG/3ML
2.5 SOLUTION RESPIRATORY (INHALATION) EVERY 6 HOURS PRN
Status: DISCONTINUED | OUTPATIENT
Start: 2024-03-27 | End: 2024-03-27

## 2024-03-27 RX ORDER — AMLODIPINE BESYLATE 10 MG/1
10 TABLET ORAL DAILY
Status: DISCONTINUED | OUTPATIENT
Start: 2024-03-27 | End: 2024-03-29 | Stop reason: HOSPADM

## 2024-03-27 RX ORDER — ACETAMINOPHEN 650 MG/1
650 SUPPOSITORY RECTAL EVERY 6 HOURS PRN
Status: DISCONTINUED | OUTPATIENT
Start: 2024-03-27 | End: 2024-03-29 | Stop reason: HOSPADM

## 2024-03-27 RX ORDER — ONDANSETRON 4 MG/1
4 TABLET, ORALLY DISINTEGRATING ORAL EVERY 8 HOURS PRN
Status: DISCONTINUED | OUTPATIENT
Start: 2024-03-27 | End: 2024-03-29 | Stop reason: HOSPADM

## 2024-03-27 RX ORDER — ALBUTEROL SULFATE 2.5 MG/3ML
2.5 SOLUTION RESPIRATORY (INHALATION) EVERY 4 HOURS PRN
Status: DISCONTINUED | OUTPATIENT
Start: 2024-03-27 | End: 2024-03-29 | Stop reason: HOSPADM

## 2024-03-27 RX ORDER — PREDNISONE 20 MG/1
40 TABLET ORAL DAILY
Status: DISCONTINUED | OUTPATIENT
Start: 2024-03-28 | End: 2024-03-27

## 2024-03-27 RX ORDER — PANTOPRAZOLE SODIUM 40 MG/1
40 TABLET, DELAYED RELEASE ORAL
Status: DISCONTINUED | OUTPATIENT
Start: 2024-03-28 | End: 2024-03-27

## 2024-03-27 RX ORDER — SODIUM CHLORIDE 9 MG/ML
INJECTION, SOLUTION INTRAVENOUS PRN
Status: DISCONTINUED | OUTPATIENT
Start: 2024-03-27 | End: 2024-03-29 | Stop reason: HOSPADM

## 2024-03-27 RX ORDER — POTASSIUM CHLORIDE 20 MEQ/1
40 TABLET, EXTENDED RELEASE ORAL PRN
Status: DISCONTINUED | OUTPATIENT
Start: 2024-03-27 | End: 2024-03-29 | Stop reason: HOSPADM

## 2024-03-27 RX ORDER — MAGNESIUM SULFATE IN WATER 40 MG/ML
2000 INJECTION, SOLUTION INTRAVENOUS PRN
Status: DISCONTINUED | OUTPATIENT
Start: 2024-03-27 | End: 2024-03-29 | Stop reason: HOSPADM

## 2024-03-27 RX ORDER — ONDANSETRON 2 MG/ML
4 INJECTION INTRAMUSCULAR; INTRAVENOUS EVERY 6 HOURS PRN
Status: DISCONTINUED | OUTPATIENT
Start: 2024-03-27 | End: 2024-03-29 | Stop reason: HOSPADM

## 2024-03-27 RX ORDER — SODIUM CHLORIDE 0.9 % (FLUSH) 0.9 %
5-40 SYRINGE (ML) INJECTION EVERY 12 HOURS SCHEDULED
Status: DISCONTINUED | OUTPATIENT
Start: 2024-03-27 | End: 2024-03-29 | Stop reason: HOSPADM

## 2024-03-27 RX ORDER — DEXTROSE MONOHYDRATE 100 MG/ML
INJECTION, SOLUTION INTRAVENOUS CONTINUOUS PRN
Status: DISCONTINUED | OUTPATIENT
Start: 2024-03-27 | End: 2024-03-29 | Stop reason: HOSPADM

## 2024-03-27 RX ORDER — ESCITALOPRAM OXALATE 10 MG/1
10 TABLET ORAL DAILY
Status: DISCONTINUED | OUTPATIENT
Start: 2024-03-27 | End: 2024-03-29 | Stop reason: HOSPADM

## 2024-03-27 RX ORDER — ACETAMINOPHEN 325 MG/1
650 TABLET ORAL EVERY 6 HOURS PRN
Status: DISCONTINUED | OUTPATIENT
Start: 2024-03-27 | End: 2024-03-29 | Stop reason: HOSPADM

## 2024-03-27 RX ORDER — POTASSIUM CHLORIDE 7.45 MG/ML
10 INJECTION INTRAVENOUS PRN
Status: DISCONTINUED | OUTPATIENT
Start: 2024-03-27 | End: 2024-03-29 | Stop reason: HOSPADM

## 2024-03-27 RX ADMIN — AZITHROMYCIN DIHYDRATE 500 MG: 500 INJECTION, POWDER, LYOPHILIZED, FOR SOLUTION INTRAVENOUS at 14:25

## 2024-03-27 RX ADMIN — IPRATROPIUM BROMIDE AND ALBUTEROL SULFATE 1 DOSE: .5; 3 SOLUTION RESPIRATORY (INHALATION) at 16:27

## 2024-03-27 RX ADMIN — SODIUM CHLORIDE, PRESERVATIVE FREE 10 ML: 5 INJECTION INTRAVENOUS at 21:03

## 2024-03-27 RX ADMIN — FOLIC ACID 1000 MCG: 1 TABLET ORAL at 14:19

## 2024-03-27 RX ADMIN — AMLODIPINE BESYLATE 10 MG: 10 TABLET ORAL at 14:19

## 2024-03-27 RX ADMIN — FAMOTIDINE 20 MG: 20 TABLET, FILM COATED ORAL at 14:18

## 2024-03-27 RX ADMIN — ENOXAPARIN SODIUM 40 MG: 100 INJECTION SUBCUTANEOUS at 14:18

## 2024-03-27 RX ADMIN — FAMOTIDINE 20 MG: 20 TABLET, FILM COATED ORAL at 21:03

## 2024-03-27 RX ADMIN — ALBUTEROL SULFATE 2.5 MG: 2.5 SOLUTION RESPIRATORY (INHALATION) at 10:03

## 2024-03-27 RX ADMIN — CLOPIDOGREL BISULFATE 75 MG: 75 TABLET ORAL at 14:18

## 2024-03-27 RX ADMIN — WATER 125 MG: 1 INJECTION INTRAMUSCULAR; INTRAVENOUS; SUBCUTANEOUS at 09:57

## 2024-03-27 RX ADMIN — IPRATROPIUM BROMIDE AND ALBUTEROL SULFATE 1 DOSE: .5; 3 SOLUTION RESPIRATORY (INHALATION) at 20:14

## 2024-03-27 RX ADMIN — CEFTRIAXONE SODIUM 1000 MG: 1 INJECTION, POWDER, FOR SOLUTION INTRAMUSCULAR; INTRAVENOUS at 15:30

## 2024-03-27 RX ADMIN — TRAZODONE HYDROCHLORIDE 50 MG: 50 TABLET ORAL at 21:03

## 2024-03-27 RX ADMIN — IPRATROPIUM BROMIDE AND ALBUTEROL SULFATE 1 DOSE: .5; 3 SOLUTION RESPIRATORY (INHALATION) at 12:34

## 2024-03-27 RX ADMIN — ATORVASTATIN CALCIUM 80 MG: 80 TABLET, FILM COATED ORAL at 21:03

## 2024-03-27 RX ADMIN — ESCITALOPRAM OXALATE 10 MG: 10 TABLET ORAL at 14:19

## 2024-03-27 ASSESSMENT — ENCOUNTER SYMPTOMS
TROUBLE SWALLOWING: 0
COUGH: 1
ABDOMINAL PAIN: 0
EYE ITCHING: 0
VOMITING: 0
COLOR CHANGE: 0
SORE THROAT: 0
CONSTIPATION: 0
EYE PAIN: 0
EYE DISCHARGE: 0
NAUSEA: 0
EYE REDNESS: 0
BACK PAIN: 0
SHORTNESS OF BREATH: 1
DIARRHEA: 0

## 2024-03-27 ASSESSMENT — PAIN - FUNCTIONAL ASSESSMENT
PAIN_FUNCTIONAL_ASSESSMENT: NONE - DENIES PAIN

## 2024-03-27 ASSESSMENT — PAIN SCALES - GENERAL: PAINLEVEL_OUTOF10: 0

## 2024-03-27 NOTE — RT PROTOCOL NOTE
RT Inhaler-Nebulizer Bronchodilator Protocol Note    There is a bronchodilator order in the chart from a provider indicating to follow the RT Bronchodilator Protocol and there is an “Initiate RT Inhaler-Nebulizer Bronchodilator Protocol” order as well (see protocol at bottom of note).    CXR Findings:  No results found.    The findings from the last RT Protocol Assessment were as follows:   History Pulmonary Disease: Chronic pulmonary disease  Respiratory Pattern: Dyspnea on exertion or RR 21-25 bpm  Breath Sounds: Inspiratory and expiratory or bilateral wheezing and/or rhonchi  Cough: Strong, productive  Indication for Bronchodilator Therapy: Wheezing associated with pulm disorder  Bronchodilator Assessment Score: 11    Aerosolized bronchodilator medication orders have been revised according to the RT Inhaler-Nebulizer Bronchodilator Protocol below.    Respiratory Therapist to perform RT Therapy Protocol Assessment initially then follow the protocol.  Repeat RT Therapy Protocol Assessment PRN for score 0-3 or on second treatment, BID, and PRN for scores above 3.    No Indications - adjust the frequency to every 6 hours PRN wheezing or bronchospasm, if no treatments needed after 48 hours then discontinue using Per Protocol order mode.     If indication present, adjust the RT bronchodilator orders based on the Bronchodilator Assessment Score as indicated below.  Use Inhaler orders unless patient has one or more of the following: on home nebulizer, not able to hold breath for 10 seconds, is not alert and oriented, cannot activate and use MDI correctly, or respiratory rate 25 breaths per minute or more, then use the equivalent nebulizer order(s) with same Frequency and PRN reasons based on the score.  If a patient is on this medication at home then do not decrease Frequency below that used at home.    0-3 - enter or revise RT bronchodilator order(s) to equivalent RT Bronchodilator order with Frequency of every 4 hours  PRN for wheezing or increased work of breathing using Per Protocol order mode.        4-6 - enter or revise RT Bronchodilator order(s) to two equivalent RT bronchodilator orders with one order with BID Frequency and one order with Frequency of every 4 hours PRN wheezing or increased work of breathing using Per Protocol order mode.        7-10 - enter or revise RT Bronchodilator order(s) to two equivalent RT bronchodilator orders with one order with TID Frequency and one order with Frequency of every 4 hours PRN wheezing or increased work of breathing using Per Protocol order mode.       11-13 - enter or revise RT Bronchodilator order(s) to one equivalent RT bronchodilator order with QID Frequency and an Albuterol order with Frequency of every 4 hours PRN wheezing or increased work of breathing using Per Protocol order mode.      Greater than 13 - enter or revise RT Bronchodilator order(s) to one equivalent RT bronchodilator order with every 4 hours Frequency and an Albuterol order with Frequency of every 2 hours PRN wheezing or increased work of breathing using Per Protocol order mode.     RT to enter RT Home Evaluation for COPD & MDI Assessment order using Per Protocol order mode.    Electronically signed by Marisel Walsh RCP on 3/27/2024 at 12:34 PM

## 2024-03-27 NOTE — ED NOTES
Pt laying in bed resting. VSS. Unlabored respirations. Pt denies being in any pain. Pt waiting on results to come back. Updated about plan of care and treatment. Call light within reach.

## 2024-03-27 NOTE — ED PROVIDER NOTES
FORMAL DIAGNOSTIC RESULTS   RADIOLOGY: Interpretation per the Radiologist below, if available at the time of this note (none if blank):  XR CHEST (2 VW)   Final Result   1. No acute cardiopulmonary finding.            **This report has been created using voice recognition software.  It may contain minor errors which are inherent in voice recognition technology.**      Final report electronically signed by Dr Karol Miles on 3/27/2024 9:10 AM        EKG: (Interpreted by me)  Sinus rhythm, fusion complexes, biatrial enlargement,  bpm, , , , no acute ischemic changes.    LABS: (None if blank)  Results for orders placed or performed during the hospital encounter of 03/27/24   COVID-19 & Influenza Combo    Specimen: Nasopharyngeal Swab   Result Value Ref Range    SARS-CoV-2 RNA, RT PCR NOT DETECTED NOT DETECTED    INFLUENZA A NOT DETECTED NOT DETECTED    INFLUENZA B NOT DETECTED NOT DETECTED   Basic Metabolic Panel w/ Reflex to MG   Result Value Ref Range    Sodium 143 135 - 145 meq/L    Potassium reflex Magnesium 3.9 3.5 - 5.2 meq/L    Chloride 105 98 - 111 meq/L    CO2 24 23 - 33 meq/L    Glucose 120 (H) 70 - 108 mg/dL    BUN 12 7 - 22 mg/dL    Creatinine 0.9 0.4 - 1.2 mg/dL    Calcium 9.3 8.5 - 10.5 mg/dL   Brain Natriuretic Peptide   Result Value Ref Range    Pro-.0 (H) 0.0 - 124.0 pg/mL   CBC with Auto Differential   Result Value Ref Range    WBC 7.2 4.8 - 10.8 thou/mm3    RBC 5.83 (H) 4.20 - 5.40 mill/mm3    Hemoglobin 17.9 (H) 12.0 - 16.0 gm/dl    Hematocrit 54.1 (H) 37.0 - 47.0 %    MCV 92.8 81.0 - 99.0 fL    MCH 30.7 26.0 - 33.0 pg    MCHC 33.1 32.2 - 35.5 gm/dl    RDW-CV 14.5 11.5 - 14.5 %    RDW-SD 48.8 (H) 35.0 - 45.0 fL    Platelets 209 130 - 400 thou/mm3    MPV 11.9 9.4 - 12.4 fL    Seg Neutrophils 46.3 %    Lymphocytes 42.5 %    Monocytes 10.2 %    Eosinophils 0.6 %    Basophils 0.3 %    Immature Granulocytes 0.1 %    Segs Absolute 3.3 1.8 - 7.7 thou/mm3     Lymphocytes Absolute 3.1 1.0 - 4.8 thou/mm3    Monocytes Absolute 0.7 0.4 - 1.3 thou/mm3    Eosinophils Absolute 0.0 0.0 - 0.4 thou/mm3    Basophils Absolute 0.0 0.0 - 0.1 thou/mm3    Immature Grans (Abs) 0.01 0.00 - 0.07 thou/mm3    nRBC 0 /100 wbc   Troponin   Result Value Ref Range    Troponin, High Sensitivity 9 0 - 12 ng/L   Anion Gap   Result Value Ref Range    Anion Gap 14.0 8.0 - 16.0 meq/L   Glomerular Filtration Rate, Estimated   Result Value Ref Range    Est, Glom Filt Rate 71 >60 ml/min/1.73m2   Osmolality   Result Value Ref Range    Osmolality Calc 285.9 275.0 - 300.0 mOsmol/kg   Blood Gas, Arterial   Result Value Ref Range    pH, Blood Gas 7.39 7.35 - 7.45    PCO2 44 35 - 45 mmhg    PO2 64 (L) 71 - 104 mmhg    HCO3 26 23 - 28 mmol/l    Base Excess (Calculated) 0.7 -2.5 - 2.5 mmol/l    O2 Sat 92 %    IFIO2 1     DEVICE Cannula     Jarek Test Positive     Source: R Radial     COLLECTED BY: [id number]    EKG 12 Lead   Result Value Ref Range    Ventricular Rate 100 BPM    Atrial Rate 100 BPM    P-R Interval 144 ms    QRS Duration 116 ms    Q-T Interval 378 ms    QTc Calculation (Bazett) 487 ms    P Axis 84 degrees    R Axis 26 degrees    T Axis 100 degrees     (Any cultures that may have been sent were not resulted at the time of this patient visit)    MEDICAL DECISION MAKING (MDM) AND ED COURSE     9:20 AM EDT:   Patient was seen and evaluated. For DDx, I will consider but not limited to: Pneumonia, bronchitis, CHF, PE, COPD, asthma, pulmonary edema, pleural effusion, AMI, URI, influenza, sinusitis, allergies, anxiety. Initial plan includes: Large-bore IV, O2 via NC to keep SaO2 greater than 90%, EKG, chest x-ray, labs including CBC, BMP, troponin, BNP, COVID-19, influenza.  Albuterol neb and IV Solu-Medrol.  Plan to admit    11:21 AM:   Stable ED stay. Feeling better on reassessment.  SaO2 96% with 1 L nasal cannula.  ED workups are consistent with COPD exacerbation and acute respiratory failure.  Patient's

## 2024-03-27 NOTE — CONSULTS
[place]      Patient - Keshia Ramirez   MRN -  [id number]   [facility] # - [id number]   - 1960      Date of Admission -  3/27/2024  8:25 AM  Date of evaluation -  3/27/2024  Room - 8A-017-A   Hospital Day - 0  Consulting - Gurwinder Lechuga MD Primary Care Physician - Amaodr Miles DO     Problem List      Active Hospital Problems    Diagnosis Date Noted    Acute respiratory failure with hypoxia (HCC) [J96.01] 2024    COPD exacerbation (HCC) [J44.1] 2014     Reason for Consult    COPD exacerbation  HPI   History Obtained From: Patient and electronic medical record.    Keshia Ramirez is a 63 y.o. female with a past medical history of COPD, HTN, HLD, AA , CVA, CHAITANYA/MDD, and current tobacco use who presented to [facility] ED on 3/27/24 with complaints of shortness of breath. Patient states she also has had a productive clear cough, dyspnea, and chills for the last few days. Her grandchildren have been sick. Patient reports not taking any inhalers for last few years.     In ED, she was saturating at 85% and placed on 2L NC. She does not wear oxygen at baseline. ABG showed pH 7.39, PCO2 44, PO2 64, HCO3 26. COVID-19 and Influenza A/B negative. Chest xray showed no acute cardiopulmonary finding. Patient received Solu-Medrol 125 mg IV once and a breathing treatment in the ED.     Of note, patient previously saw Pulmonology, Dr. Goyal last in .     PMHx   Past Medical History      Diagnosis Date    AAA (abdominal aortic aneurysm) (HCC) 2014    Follows with Dr. Molina's group    Chronic pancreatitis ([facility])     COPD (chronic obstructive pulmonary disease) ([facility])     Dyslipidemia     Essential hypertension 2014    Former smoker     High cholesterol     History of colon polyps     History of stroke with residual deficit; cryptogenic 2017    Follows with neuro and cardio at [facility]    Osteoarthritis       Past Surgical History       tablet by mouth twice a day  clopidogrel (PLAVIX) 75 MG tablet, take 1 tablet by mouth once daily  traZODone (DESYREL) 50 MG tablet, take 1 tablet by mouth at bedtime  sucralfate (CARAFATE) 1 GM tablet, Sucralfate Active 1 GM PO Four Times Daily April 14th, 2022 4:41pm (Patient not taking: Reported on 10/4/2023)  dicyclomine (BENTYL) 10 MG capsule, take 1 capsule by mouth four times a day before meals and at bedtime  pantoprazole (PROTONIX) 40 MG tablet, Take 1 tablet by mouth every morning (before breakfast) (Patient not taking: Reported on 3/27/2024)  ondansetron (ZOFRAN) 4 MG tablet, Take 1 tablet by mouth every 8 hours as needed for Nausea or Vomiting 1 tablet every 6-8 hours for nausea vomiting  albuterol sulfate HFA (VENTOLIN HFA) 108 (90 Base) MCG/ACT inhaler, Inhale 2 puffs into the lungs 4 times daily as needed for Wheezing or Shortness of Breath (Patient not taking: Reported on 3/27/2024)  salmeterol (SEREVENT DISKUS) 50 MCG/DOSE diskus inhaler, Inhale 1 puff into the lungs 2 times daily (Patient not taking: Reported on 3/27/2024)  acetaminophen (TYLENOL) 325 MG tablet, Take 2-3 tablets by mouth every 6 hours as needed for Pain  Diet    ADULT DIET; Regular  Allergies    Aspirin, Flagyl [metronidazole], Ibuprofen, Spiriva handihaler [tiotropium bromide monohydrate], and Sulfa antibiotics  Social History     Social History     Socioeconomic History    Marital status:      Spouse name: Not on file    Number of children: 3    Years of education: Not on file    Highest education level: Not on file   Occupational History    Occupation: prep     Employer: [employer] [place], [place]   Tobacco Use    Smoking status: Every Day     Current packs/day: 0.50     Average packs/day: 0.5 packs/day for 46.0 years (23.0 ttl pk-yrs)     Types: Cigarettes    Smokeless tobacco: Never   Vaping Use    Vaping Use: Never used   Substance and Sexual Activity    Alcohol use: No     Alcohol/week: 0.0 standard drinks of

## 2024-03-27 NOTE — H&P
History & Physical       Patient: Keshia Ramirez  YOB: 1960    MRN: [id number]     Acct: [id number]    PCP: Amador Miles DO    Date of Admission: 3/27/2024    Date of Service: Patient seen / examined on 03/27/24 and admitted to Inpatient with expected LOS greater than two midnights due to medical therapy.       ASSESSMENT / PLAN:  Acute hypoxic respiratory failure: Secondary to COPD exacerbation.  Presented SpO2 85%, escalated to 2 L NC.  ABG with hypoxia, pO2 64.  Influenza A/B and COVID-19 negative.  CXR no acute intrathoracic process.  Continue wean as tolerated for SpO2> 90%.  Encourage aggressive pulmonary hygiene with incentive spirometer and Acapella.  Repeat CXR check ABG if respiratory status worsens.  COPD exacerbation: Suspect secondary to persistent smoking.  Endorses compliance with home inhalers including salmeterol and albuterol.  Continue Rocephin x 5 and azithromycin x 3.  Continue prednisone 40 mg p.o. daily x 5.  Started DuoNebs.  HTN: Continue amlodipine 10 mg p.o. daily  HLD: Continue Lipitor 80 mg p.o. nightly  AAA: Per chart review.  History of CVA: With no residual deficits  GERD: Continue Pepcid 20 mg p.o. twice daily, pantoprazole 40 mg p.o. every morning and Carafate 1 g p.o. 4 times daily  CHAITANYA/MDD: Continue Lexapro 10 mg p.o. daily and trazodone 50 mg p.o. nightly  Tobacco abuse: Current smoker, endorses 0.5 PPD for 23 PPY.  Counseled smoking cessation.  Nicotine patch offered.      Chief Complaint: Shortness of breath    History of Present Illness:  The patient is a 63-year-old female with a PMH of COPD, HTN, HLD, AAA, CVA, CHAITANYA/MDD, and tobacco abuse presented [facility] ED 3/27/2024 for complaint of shortness of breath.  The patient states that she has had shortness of breath associated with some dyspnea, chills and a clear productive cough or the past few days.  She endorses sick contacts, stating her grandchildren were sick.  Additionally, she continues to  smoke.    In the ED, vitals Tmax 97.9, RR 25, , /104, SpO2 85% RA.  Escalated to 2 L NC.  Labs significant for Hgb 17.9, HCT 54.1. proBNP 199, HST 9.  EKG SR with fusion complexes, HOLLIE, , QTc 47.  Influenza A/B and COVID-19 negative.  ABG pH 7.39, pCO2 44, pO2 64, HCO3 26. CXR negative for intrathoracic process.  She was given breathing treatments x 1 and Solu-Medrol 125 mg IV x 1.  The patient admitted for further management evaluation    Past Medical History:    Past Medical History:   Diagnosis Date    AAA (abdominal aortic aneurysm) ([facility]) 2014    Follows with Dr. Molina's group    Chronic pancreatitis ([facility])     COPD (chronic obstructive pulmonary disease) ([facility])     Dyslipidemia     Essential hypertension 2014    Former smoker     High cholesterol     History of colon polyps     History of stroke with residual deficit; cryptogenic 2017    Follows with neuro and cardio at [facility]    Osteoarthritis      Past Surgical History:    Past Surgical History:   Procedure Laterality Date    ABDOMEN SURGERY      c sections x3    CARPAL TUNNEL RELEASE Bilateral      SECTION      x3    COLONOSCOPY      Dr. Catia MCGOWAN  ,    INSERTABLE CARDIAC MONITOR      TONSILLECTOMY      as child    UPPER GASTROINTESTINAL ENDOSCOPY        Medications Prior to Admission:   No current facility-administered medications on file prior to encounter.     Current Outpatient Medications on File Prior to Encounter   Medication Sig Dispense Refill    amLODIPine (NORVASC) 10 MG tablet take 1 tablet by mouth once daily 90 tablet 0    escitalopram (LEXAPRO) 10 MG tablet take 1 tablet by mouth once daily 90 tablet 0    atorvastatin (LIPITOR) 80 MG tablet take 1 tablet by mouth once daily at bedtime 90 tablet 3    folic acid (FOLVITE) 1 MG tablet take 1 tablet by mouth once daily 90 tablet 3    varenicline (CHANTIX CONTINUING MONTH MARISOL) 1 MG tablet Take 1 tablet by mouth 2 times daily 60

## 2024-03-27 NOTE — ED NOTES
Pt. Resting in bed with even and unlabored respirations. Pt. Denies any pain. Pt. Updated about plan of care and treatment. rt at bedside for breathing tx and ABG.  Pt. Has no further concerns, questions or needs at this time. Call light within reach.

## 2024-03-27 NOTE — ED NOTES
Pt arrives to the ED for sob that started about 4 days ago along with congestion, runny nose and cough. Pt states her sob has progressively become worse over the last several days. Pt was ambulatory to the room and was 85% on room air upon arrival. Placed on 2 L nasal canula. Pt states she has hx of copd and is a current everyday smoker. Pt denies any chest pain. Pt respirations are labored at this time. VSS

## 2024-03-27 NOTE — ED NOTES
ED to inpatient nurses report      Chief Complaint:  Chief Complaint   Patient presents with    Shortness of Breath     Present to ED from: home     MOA:     LOC: alert and orientated to name, place, date  Mobility: Requires assistance * 1  Oxygen Baseline: room air     Current needs required: 1.5 L nasal canula      Code Status:   Prior    What abnormal results were found and what did you give/do to treat them? Acute respiratory failure, copd  Any procedures or intervention occur? Nasal canula, solu-medrol, breathing tx    Mental Status:  Level of Consciousness: Alert (0)    Psych Assessment:        Vitals:  Patient Vitals for the past 24 hrs:   BP Temp Temp src Pulse Resp SpO2 Height Weight   03/27/24 1104 (!) 159/78 -- -- 82 20 90 % -- --   03/27/24 1010 (!) 145/78 -- -- 89 19 99 % -- --   03/27/24 1007 -- -- -- 83 18 96 % -- --   03/27/24 1003 -- -- -- 88 18 92 % -- --   03/27/24 0931 (!) 146/69 -- -- 84 22 94 % -- --   03/27/24 0839 -- -- -- -- -- 90 % -- --   03/27/24 0833 (!) 151/104 97.9 °F (36.6 °C) Oral (!) 110 25 (!) 85 % 1.6 m (5' 3\") 63.5 kg (140 lb)        LDAs:   Peripheral IV 03/27/24 Left Antecubital (Active)   Site Assessment Clean, dry & intact 03/27/24 1011   Line Status Normal saline locked 03/27/24 1011   Phlebitis Assessment No symptoms 03/27/24 1011   Infiltration Assessment 0 03/27/24 1011   Dressing Status Clean, dry & intact 03/27/24 1011   Dressing Type Transparent 03/27/24 0840   Dressing Intervention New 03/27/24 0840       Ambulatory Status:  Presents to emergency department  because of falls (Syncope, seizure, or loss of consciousness): No, Age > 70: No, Altered Mental Status, Intoxication with alcohol or substance confusion (Disorientation, impaired judgment, poor safety awaremess, or inability to follow instructions): No, Impaired Mobility: Ambulates or transfers with assistive devices or assistance; Unable to ambulate or transer.: No, Nursing Judgement:  No    Diagnosis:  DISPOSITION Admitted 2024 11:29:53 AM   Final diagnoses:   COPD exacerbation (HCC)   Acute respiratory failure with hypoxia (HCC)        Consults:  None     Pain Score:  Pain Assessment  Pain Assessment: None - Denies Pain    C-SSRS:        Sepsis Screening:  Sepsis Risk Score: 1.13    [place] Fall Risk:  [place] 1 Fall Risk  Presents to emergency department  because of falls (Syncope, seizure, or loss of consciousness): No  Age > 70: No  Altered Mental Status, Intoxication with alcohol or substance confusion (Disorientation, impaired judgment, poor safety awaremess, or inability to follow instructions): No  Impaired Mobility: Ambulates or transfers with assistive devices or assistance; Unable to ambulate or transer.: No  Nursing Judgement: No    Swallow Screening        Preferred Language:   English      ALLERGIES     Aspirin, Flagyl [metronidazole], Ibuprofen, Spiriva handihaler [tiotropium bromide monohydrate], and Sulfa antibiotics    SURGICAL HISTORY       Past Surgical History:   Procedure Laterality Date    ABDOMEN SURGERY      c sections x3    CARPAL TUNNEL RELEASE Bilateral      SECTION      x3    COLONOSCOPY      Dr. Bardales    EGGEORGINA      INSERTABLE CARDIAC MONITOR      TONSILLECTOMY      as child    UPPER GASTROINTESTINAL ENDOSCOPY         PAST MEDICAL HISTORY       Past Medical History:   Diagnosis Date    AAA (abdominal aortic aneurysm) (HCC) 2014    Follows with Dr. Molina's group    Chronic pancreatitis (HCC)     COPD (chronic obstructive pulmonary disease) ([facility])     Dyslipidemia     Essential hypertension 2014    Former smoker     High cholesterol     History of colon polyps     History of stroke with residual deficit; cryptogenic 2017    Follows with neuro and cardio at [facility]    Osteoarthritis            Electronically signed by Jocy Arana RN on 3/27/2024 at 11:56 AM

## 2024-03-27 NOTE — ED NOTES
Pt in bed resting. Just got back from x-ray. VSS. Unlabored respirations at this time. Pt has no concerns at this time. Denies having any pain. Pt updated about plan of care and treatment. Call light within reach.

## 2024-03-28 LAB
ANION GAP SERPL CALC-SCNC: 13 MEQ/L (ref 8–16)
BUN SERPL-MCNC: 20 MG/DL (ref 7–22)
CALCIUM SERPL-MCNC: 9.2 MG/DL (ref 8.5–10.5)
CHLORIDE SERPL-SCNC: 104 MEQ/L (ref 98–111)
CO2 SERPL-SCNC: 24 MEQ/L (ref 23–33)
CREAT SERPL-MCNC: 0.8 MG/DL (ref 0.4–1.2)
DEPRECATED RDW RBC AUTO: 47.3 FL (ref 35–45)
ERYTHROCYTE [DISTWIDTH] IN BLOOD BY AUTOMATED COUNT: 13.9 % (ref 11.5–14.5)
GFR SERPL CREATININE-BSD FRML MDRD: 81 ML/MIN/1.73M2
GLUCOSE SERPL-MCNC: 119 MG/DL (ref 70–108)
HCT VFR BLD AUTO: 48.3 % (ref 37–47)
HGB BLD-MCNC: 15.6 GM/DL (ref 12–16)
MCH RBC QN AUTO: 30.1 PG (ref 26–33)
MCHC RBC AUTO-ENTMCNC: 32.3 GM/DL (ref 32.2–35.5)
MCV RBC AUTO: 93.1 FL (ref 81–99)
PLATELET # BLD AUTO: 169 THOU/MM3 (ref 130–400)
PMV BLD AUTO: 11.2 FL (ref 9.4–12.4)
POTASSIUM SERPL-SCNC: 4.2 MEQ/L (ref 3.5–5.2)
RBC # BLD AUTO: 5.19 MILL/MM3 (ref 4.2–5.4)
SODIUM SERPL-SCNC: 141 MEQ/L (ref 135–145)
WBC # BLD AUTO: 8.6 THOU/MM3 (ref 4.8–10.8)

## 2024-03-28 PROCEDURE — 94669 MECHANICAL CHEST WALL OSCILL: CPT

## 2024-03-28 PROCEDURE — 6360000002 HC RX W HCPCS: Performed by: STUDENT IN AN ORGANIZED HEALTH CARE EDUCATION/TRAINING PROGRAM

## 2024-03-28 PROCEDURE — 6370000000 HC RX 637 (ALT 250 FOR IP): Performed by: STUDENT IN AN ORGANIZED HEALTH CARE EDUCATION/TRAINING PROGRAM

## 2024-03-28 PROCEDURE — 94761 N-INVAS EAR/PLS OXIMETRY MLT: CPT

## 2024-03-28 PROCEDURE — 2700000000 HC OXYGEN THERAPY PER DAY

## 2024-03-28 PROCEDURE — 99233 SBSQ HOSP IP/OBS HIGH 50: CPT | Performed by: INTERNAL MEDICINE

## 2024-03-28 PROCEDURE — 85027 COMPLETE CBC AUTOMATED: CPT

## 2024-03-28 PROCEDURE — 80048 BASIC METABOLIC PNL TOTAL CA: CPT

## 2024-03-28 PROCEDURE — 6370000000 HC RX 637 (ALT 250 FOR IP): Performed by: PHYSICIAN ASSISTANT

## 2024-03-28 PROCEDURE — 2580000003 HC RX 258: Performed by: STUDENT IN AN ORGANIZED HEALTH CARE EDUCATION/TRAINING PROGRAM

## 2024-03-28 PROCEDURE — 36415 COLL VENOUS BLD VENIPUNCTURE: CPT

## 2024-03-28 PROCEDURE — 99232 SBSQ HOSP IP/OBS MODERATE 35: CPT | Performed by: PHYSICIAN ASSISTANT

## 2024-03-28 PROCEDURE — 1200000003 HC TELEMETRY R&B

## 2024-03-28 PROCEDURE — 94640 AIRWAY INHALATION TREATMENT: CPT

## 2024-03-28 RX ORDER — PREDNISONE 20 MG/1
40 TABLET ORAL DAILY
Status: DISCONTINUED | OUTPATIENT
Start: 2024-03-29 | End: 2024-03-29 | Stop reason: HOSPADM

## 2024-03-28 RX ORDER — IPRATROPIUM BROMIDE AND ALBUTEROL SULFATE 2.5; .5 MG/3ML; MG/3ML
1 SOLUTION RESPIRATORY (INHALATION) 3 TIMES DAILY
Status: DISCONTINUED | OUTPATIENT
Start: 2024-03-28 | End: 2024-03-29 | Stop reason: HOSPADM

## 2024-03-28 RX ADMIN — WATER 40 MG: 1 INJECTION INTRAMUSCULAR; INTRAVENOUS; SUBCUTANEOUS at 08:02

## 2024-03-28 RX ADMIN — ENOXAPARIN SODIUM 40 MG: 100 INJECTION SUBCUTANEOUS at 08:02

## 2024-03-28 RX ADMIN — FOLIC ACID 1000 MCG: 1 TABLET ORAL at 08:02

## 2024-03-28 RX ADMIN — AZITHROMYCIN DIHYDRATE 500 MG: 500 INJECTION, POWDER, LYOPHILIZED, FOR SOLUTION INTRAVENOUS at 13:09

## 2024-03-28 RX ADMIN — TRAZODONE HYDROCHLORIDE 50 MG: 50 TABLET ORAL at 20:36

## 2024-03-28 RX ADMIN — CLOPIDOGREL BISULFATE 75 MG: 75 TABLET ORAL at 08:02

## 2024-03-28 RX ADMIN — IPRATROPIUM BROMIDE AND ALBUTEROL SULFATE 1 DOSE: .5; 3 SOLUTION RESPIRATORY (INHALATION) at 13:31

## 2024-03-28 RX ADMIN — FAMOTIDINE 20 MG: 20 TABLET, FILM COATED ORAL at 20:36

## 2024-03-28 RX ADMIN — ESCITALOPRAM OXALATE 10 MG: 10 TABLET ORAL at 08:02

## 2024-03-28 RX ADMIN — IPRATROPIUM BROMIDE AND ALBUTEROL SULFATE 1 DOSE: .5; 3 SOLUTION RESPIRATORY (INHALATION) at 08:34

## 2024-03-28 RX ADMIN — FAMOTIDINE 20 MG: 20 TABLET, FILM COATED ORAL at 08:02

## 2024-03-28 RX ADMIN — SODIUM CHLORIDE, PRESERVATIVE FREE 10 ML: 5 INJECTION INTRAVENOUS at 08:02

## 2024-03-28 RX ADMIN — ATORVASTATIN CALCIUM 80 MG: 80 TABLET, FILM COATED ORAL at 20:35

## 2024-03-28 RX ADMIN — IPRATROPIUM BROMIDE AND ALBUTEROL SULFATE 1 DOSE: .5; 3 SOLUTION RESPIRATORY (INHALATION) at 18:20

## 2024-03-28 RX ADMIN — AMLODIPINE BESYLATE 10 MG: 10 TABLET ORAL at 08:02

## 2024-03-28 RX ADMIN — SODIUM CHLORIDE, PRESERVATIVE FREE 10 ML: 5 INJECTION INTRAVENOUS at 20:36

## 2024-03-28 ASSESSMENT — PAIN SCALES - GENERAL
PAINLEVEL_OUTOF10: 0

## 2024-03-28 NOTE — PROGRESS NOTES
[place]      Patient - Keshia Ramirez   MRN -  [id number]   [facility] # - [id number]   - 1960      Date of Admission -  3/27/2024  8:25 AM  Date of evaluation -  3/28/2024  Room - 8A--A   Hospital Day - 1  Consulting - Javier Cook, HORACE Primary Care Physician - Amador Miles DO     Problem List      Active Hospital Problems    Diagnosis Date Noted    Acute respiratory failure with hypoxia ([facility]) [J96.01] 2024    COPD exacerbation ([facility]) [J44.1] 2014     Reason for Consult    COPD exacerbation  HPI   History Obtained From: Patient and electronic medical record.    Keshia Ramirez is a 63 y.o. female with a past medical history of COPD, HTN, HLD, AA , CVA, CHAITANYA/MDD, and current tobacco use who presented to [facility] ED on 3/27/24 with complaints of shortness of breath. Patient states she also has had a productive clear cough, dyspnea, and chills for the last few days. Her grandchildren have been sick. Patient reports not taking any inhalers for last few years.      In ED, she was saturating at 85% and placed on 2L NC. She does not wear oxygen at baseline. ABG showed pH 7.39, PCO2 44, PO2 64, HCO3 26. COVID-19 and Influenza A/B negative. Chest xray showed no acute cardiopulmonary finding. Patient received Solu-Medrol 125 mg IV once and a breathing treatment in the ED.      Of note, patient previously saw Pulmonology, Dr. Goyal last in .    Last 24 hours   No new issues overnight.  Patient weaned down to 1L NC.   She reports feeling better with her SOB and cough.  All other systems reviewed.     PMHx   Past Medical History      Diagnosis Date    AAA (abdominal aortic aneurysm) ([facility]) 2014    Follows with Dr. Molina's group    Chronic pancreatitis ([facility])     COPD (chronic obstructive pulmonary disease) ([facility])     Dyslipidemia     Essential hypertension 2014    Former smoker     High cholesterol     History of colon polyps      in the last 72 hours.  INR  No results for input(s): \"INR\", \"PROTIME\" in the last 72 hours.  PTT  No results for input(s): \"APTT\" in the last 72 hours.  Glucose  No results for input(s): \"POCGLU\" in the last 72 hours.  UA No results for input(s): \"SPECGRAV\", \"PHUR\", \"COLORU\", \"CLARITYU\", \"MUCUS\", \"PROTEINU\", \"BLOODU\", \"RBCUA\", \"WBCUA\", \"BACTERIA\", \"NITRU\", \"GLUCOSEU\", \"BILIRUBINUR\", \"UROBILINOGEN\", \"KETUA\", \"LABCAST\", \"LABCASTTY\", \"AMORPHOS\" in the last 72 hours.    Invalid input(s): \"CRYSTALS\".    PFTs   PFTs from 6/25/18.             Sleep studies   None noted in EPIC.     Cultures    COVID-19 and Influenza not detected.     EKG     Echocardiogram   Most recent echocardiogram from 9/13/23 showed:   Ejection fraction is visually estimated at 55%.   Overall left ventricular function is normal.   The aortic valve leaflets were not well visualized.   Thickened aortic valve leaflets noted.   Aortic valve leaflets are Moderately calcified.   Mild aortic stenosis is present.   Moderate aortic regurgitation is noted.    Radiology    CXR              XR CHEST (2 VW)     CLINICAL INFORMATION: sob     COMPARISON: Chest radiograph 11/1/2022     TECHNIQUE: PA and lateral views of the chest performed.        FINDINGS:         A loop recorder is noted.     No focal pulmonary consolidation.      Cardiac silhouette is not enlarged.      No pleural effusion. No pneumothorax.      No acute bony abnormality. Mild degenerative changes of the thoracic spine.        IMPRESSION:  1. No acute cardiopulmonary finding.       CT Scans  (See actual reports for details)    Assessment   Acute hypoxic respiratory failure-currently on 1L NC  COPD exacerbation  Hypertension  Hyperlipidemia  AAA  Hx of CVA  GERD  CHAITANYA/MDD  Current everyday tobacco abuse  Hx of Marijuana abuse    Plan   -Maintain SpO2 greater than 88%, wean as tolerated.  -Continue Zithromax 500 mg IV every 24 hours for total of 3 doses (3/27/24 to 3/29/24)   -Discontinue Rocephin

## 2024-03-28 NOTE — PLAN OF CARE
Problem: Discharge Planning  Goal: Discharge to home or other facility with appropriate resources  3/28/2024 1202 by Haley Cordova RN  Outcome: Progressing  Flowsheets (Taken 3/28/2024 1202)  Discharge to home or other facility with appropriate resources: Identify barriers to discharge with patient and caregiver     Problem: Respiratory - Adult  Goal: Clear lung sounds  3/28/2024 1202 by Haley Cordova RN  Outcome: Progressing     Problem: Respiratory - Adult  Goal: Achieves optimal ventilation and oxygenation  3/28/2024 1202 by Haley Cordova RN  Outcome: Progressing  Flowsheets (Taken 3/28/2024 1202)  Achieves optimal ventilation and oxygenation:   Assess for changes in respiratory status   Assess for changes in mentation and behavior     Problem: Skin/Tissue Integrity - Adult  Goal: Skin integrity remains intact  3/28/2024 1202 by Haley Cordova RN  Outcome: Progressing  Flowsheets (Taken 3/28/2024 1202)  Skin Integrity Remains Intact: Monitor for areas of redness and/or skin breakdown     Problem: Safety - Adult  Goal: Free from fall injury  3/28/2024 1202 by Haley Cordova RN  Outcome: Progressing  Flowsheets (Taken 3/28/2024 1202)  Free From Fall Injury: Instruct family/caregiver on patient safety     Problem: Pain  Goal: Verbalizes/displays adequate comfort level or baseline comfort level  3/28/2024 1202 by Haley Cordova RN  Outcome: Progressing  Flowsheets (Taken 3/28/2024 1202)  Verbalizes/displays adequate comfort level or baseline comfort level:   Encourage patient to monitor pain and request assistance   Assess pain using appropriate pain scale   Care plan reviewed with patient.  Patient verbalizes understanding of the plan of care and contributes to goal setting.

## 2024-03-28 NOTE — RT PROTOCOL NOTE
RT Inhaler-Nebulizer Bronchodilator Protocol Note    There is a bronchodilator order in the chart from a provider indicating to follow the RT Bronchodilator Protocol and there is an “Initiate RT Inhaler-Nebulizer Bronchodilator Protocol” order as well (see protocol at bottom of note).    CXR Findings:  No results found.    The findings from the last RT Protocol Assessment were as follows:   History Pulmonary Disease: Chronic pulmonary disease  Respiratory Pattern: Dyspnea on exertion or RR 21-25 bpm  Breath Sounds: Inspiratory and expiratory or bilateral wheezing and/or rhonchi  Cough: Strong, productive  Indication for Bronchodilator Therapy: Wheezing associated with pulm disorder  Bronchodilator Assessment Score: 11    Aerosolized bronchodilator medication orders have been revised according to the RT Inhaler-Nebulizer Bronchodilator Protocol below.    Respiratory Therapist to perform RT Therapy Protocol Assessment initially then follow the protocol.  Repeat RT Therapy Protocol Assessment PRN for score 0-3 or on second treatment, BID, and PRN for scores above 3.    No Indications - adjust the frequency to every 6 hours PRN wheezing or bronchospasm, if no treatments needed after 48 hours then discontinue using Per Protocol order mode.     If indication present, adjust the RT bronchodilator orders based on the Bronchodilator Assessment Score as indicated below.  Use Inhaler orders unless patient has one or more of the following: on home nebulizer, not able to hold breath for 10 seconds, is not alert and oriented, cannot activate and use MDI correctly, or respiratory rate 25 breaths per minute or more, then use the equivalent nebulizer order(s) with same Frequency and PRN reasons based on the score.  If a patient is on this medication at home then do not decrease Frequency below that used at home.    0-3 - enter or revise RT bronchodilator order(s) to equivalent RT Bronchodilator order with Frequency of every 4 hours

## 2024-03-28 NOTE — CARE COORDINATION
Case Management Assessment  Initial Evaluation    Date/Time of Evaluation: 3/28/2024 3:43 PM  Assessment Completed by: Bridget Wallace RN    If patient is discharged prior to next notation, then this note serves as note for discharge by case management.    Patient Name: Keshia Ramirez                   YOB: 1960  Diagnosis: COPD exacerbation (HCC) [J44.1]  Acute respiratory failure with hypoxia (HCC) [J96.01]                   Date / Time: 3/27/2024  8:25 AM  [facility]: [facility]     Patient Admission Status: Inpatient   Readmission Risk Low 0-14, Mod 15-19), High > 20: Readmission Risk Score: 8.7    Current PCP: Amador Miles, DO  PCP verified by CM? Yes    Chart Reviewed: Yes      History Provided by: Patient  Patient Orientation: Alert and Oriented    Patient Cognition: Alert    Hospitalization in the last 30 days (Readmission):  No    If yes, Readmission Assessment in CM Navigator will be completed.    Advance Directives:      Code Status: Full Code   Patient's Primary Decision Maker is: Named in Scanned ACP Document      Discharge Planning:    Patient lives with: Spouse/Significant Other Type of Home: House  Primary Care Giver: Self  Patient Support Systems include: Spouse/Significant Other, Children   Current Financial resources: Medicare, Medicaid  Current community resources: None  Current services prior to admission: None            Current DME:              Type of Home Care services:  None    ADLS  Prior functional level: Independent in ADLs/IADLs  Current functional level: Independent in ADLs/IADLs    Family can provide assistance at DC: Yes  Would you like Case Management to discuss the discharge plan with any other family members/significant others, and if so, who? No  Plans to Return to Present Housing: Yes  Other Identified Issues/Barriers to RETURNING to current housing: No  Potential Assistance needed at discharge: N/A            Potential DME:    Patient expects to

## 2024-03-28 NOTE — PROGRESS NOTES
Hospitalist Progress Note    Patient:  Keshia Ramirez      Unit/Bed:8A-17/017-A    YOB: 1960    MRN: [id number]       Acct: [id number]     PCP: Amador Miles DO    Date of Admission: 3/27/2024    Assessment/Plan:    Acute Hypoxic Respiratory Failure Secondary to COPD exacerbation:  Wean supplemental O2 as able, patient qualified for O2 with ambulation at 1 L  Pulmonology consulted   Continue PO steroids and abx, and breathing treatments  Pulmonary hygiene  May f/u with pulmonology outpatient    HTN:   Continue amlodipine 10 mg p.o. daily    HLD:   Continue Lipitor 80 mg p.o. nightly    AAA: Per chart review.    History of CVA:   With no residual deficits    GERD:   Continue Pepcid 20 mg p.o. twice daily, pantoprazole 40 mg p.o. every morning and Carafate 1 g p.o. 4 times daily    CHAITANYA/MDD:   Continue Lexapro 10 mg p.o. daily and trazodone 50 mg p.o. nightly    Tobacco abuse:   Current smoker, endorses 0.5 PPD for 23 PPY.  Counseled smoking cessation.  Nicotine patch offered.    Chief Complaint: Shortness of breath      Subjective: 63 y.o. female admitted to the hospitalist service for acute hypoxic respiratory failure /2 COPD exacerbation. Patient reports breathing better today than she was yesterday. She denies chest pain. She denies nausea or vomiting.    Medications:    Infusion Medications    dextrose      sodium chloride       Scheduled Medications    ipratropium 0.5 mg-albuterol 2.5 mg  1 Dose Inhalation TID    [START ON 3/29/2024] predniSONE  40 mg Oral Daily    amLODIPine  10 mg Oral Daily    atorvastatin  80 mg Oral Nightly    clopidogrel  75 mg Oral Daily    escitalopram  10 mg Oral Daily    famotidine  20 mg Oral BID    folic acid  1,000 mcg Oral Daily    traZODone  50 mg Oral Nightly    sodium chloride flush  5-40 mL IntraVENous 2 times per day    enoxaparin  40 mg SubCUTAneous Daily    azithromycin  500 mg IntraVENous Daily     PRN Meds: potassium chloride **OR** potassium

## 2024-03-28 NOTE — PLAN OF CARE
Problem: Discharge Planning  Goal: Discharge to home or other facility with appropriate resources  Outcome: Progressing  Flowsheets  Taken 3/27/2024 2049 by Servando Berumen, RN  Discharge to home or other facility with appropriate resources:   Identify barriers to discharge with patient and caregiver   Arrange for needed discharge resources and transportation as appropriate   Identify discharge learning needs (meds, wound care, etc)  Taken 3/27/2024 1219 by Cookie Painting RN  Discharge to home or other facility with appropriate resources: Identify discharge learning needs (meds, wound care, etc)     Problem: Respiratory - Adult  Goal: Clear lung sounds  3/27/2024 2316 by Servando Berumen, RN  Outcome: Progressing     Problem: Respiratory - Adult  Goal: Achieves optimal ventilation and oxygenation  Outcome: Progressing  Flowsheets (Taken 3/27/2024 2049)  Achieves optimal ventilation and oxygenation:   Assess for changes in respiratory status   Position to facilitate oxygenation and minimize respiratory effort   Initiate smoking cessation protocol as indicated   Assess the need for suctioning and aspirate as needed   Respiratory therapy support as indicated   Assess for changes in mentation and behavior   Oxygen supplementation based on oxygen saturation or arterial blood gases   Assess and instruct to report shortness of breath or any respiratory difficulty     Problem: Skin/Tissue Integrity - Adult  Goal: Skin integrity remains intact  Outcome: Progressing  Flowsheets (Taken 3/27/2024 2049)  Skin Integrity Remains Intact: Monitor for areas of redness and/or skin breakdown     Problem: Safety - Adult  Goal: Free from fall injury  Outcome: Progressing  Flowsheets (Taken 3/27/2024 2316)  Free From Fall Injury: Instruct family/caregiver on patient safety     Problem: Pain  Goal: Verbalizes/displays adequate comfort level or baseline comfort level  Outcome: Progressing  Flowsheets (Taken 3/27/2024

## 2024-03-28 NOTE — PLAN OF CARE
Problem: Respiratory - Adult  Goal: Clear lung sounds  3/27/2024 2017 by Vadim Sanchez RCP  Outcome: Progressing   Continue breathing tx to improve wob and lung sounds

## 2024-03-28 NOTE — PROGRESS NOTES
A home oxygen evaluation has been completed.     [x]Patient is an inpatient. It is expected that the patient will be discharged within the next 48 hours. Qualified provider to write order for home prescription if patient qualifies. Social service/care managers will arrange for home oxygen.  If patient is active, arrange for Home Medical supplier to assess for Oxygen Conserving Device per pulse oximetry.  []Patient is an outpatient. Results will be faxed to the ordering provider. Qualified provider to write order for home prescription if patient qualifies and arranges for home oxygen.    Patient was placed on room air for 30 minutes. SpO2 was 91 % on room air at rest. Patients SpO2 was 89% or above and did not qualify for home oxygen. Patient was walked for 8 minutes. SpO2 was 87 % during walking. Patients SpO2 was below 89% and qualified for home oxygen. Oxygen was applied at 1 lpm via nasal cannula  and explained to the patient to breathe in through her nose and out her mouth to maintain a SpO2 between 90-92% while walking. Actual SpO2 was 91 %.

## 2024-03-29 VITALS
TEMPERATURE: 98.3 F | SYSTOLIC BLOOD PRESSURE: 138 MMHG | BODY MASS INDEX: 26.45 KG/M2 | DIASTOLIC BLOOD PRESSURE: 70 MMHG | RESPIRATION RATE: 20 BRPM | HEART RATE: 68 BPM | OXYGEN SATURATION: 90 % | WEIGHT: 149.3 LBS | HEIGHT: 63 IN

## 2024-03-29 LAB
ANION GAP SERPL CALC-SCNC: 15 MEQ/L (ref 8–16)
BUN SERPL-MCNC: 24 MG/DL (ref 7–22)
CALCIUM SERPL-MCNC: 8.8 MG/DL (ref 8.5–10.5)
CHLORIDE SERPL-SCNC: 106 MEQ/L (ref 98–111)
CO2 SERPL-SCNC: 24 MEQ/L (ref 23–33)
CREAT SERPL-MCNC: 0.8 MG/DL (ref 0.4–1.2)
DEPRECATED RDW RBC AUTO: 49.1 FL (ref 35–45)
EKG ATRIAL RATE: 100 BPM
EKG P AXIS: 84 DEGREES
EKG P-R INTERVAL: 144 MS
EKG Q-T INTERVAL: 378 MS
EKG QRS DURATION: 116 MS
EKG QTC CALCULATION (BAZETT): 487 MS
EKG R AXIS: 26 DEGREES
EKG T AXIS: 100 DEGREES
EKG VENTRICULAR RATE: 100 BPM
ERYTHROCYTE [DISTWIDTH] IN BLOOD BY AUTOMATED COUNT: 14.3 % (ref 11.5–14.5)
GFR SERPL CREATININE-BSD FRML MDRD: 81 ML/MIN/1.73M2
GLUCOSE SERPL-MCNC: 88 MG/DL (ref 70–108)
HCT VFR BLD AUTO: 46.5 % (ref 37–47)
HGB BLD-MCNC: 14.9 GM/DL (ref 12–16)
MCH RBC QN AUTO: 30 PG (ref 26–33)
MCHC RBC AUTO-ENTMCNC: 32 GM/DL (ref 32.2–35.5)
MCV RBC AUTO: 93.6 FL (ref 81–99)
PLATELET # BLD AUTO: 175 THOU/MM3 (ref 130–400)
PMV BLD AUTO: 11.2 FL (ref 9.4–12.4)
POTASSIUM SERPL-SCNC: 4.1 MEQ/L (ref 3.5–5.2)
RBC # BLD AUTO: 4.97 MILL/MM3 (ref 4.2–5.4)
SODIUM SERPL-SCNC: 145 MEQ/L (ref 135–145)
WBC # BLD AUTO: 10.7 THOU/MM3 (ref 4.8–10.8)

## 2024-03-29 PROCEDURE — 6370000000 HC RX 637 (ALT 250 FOR IP): Performed by: STUDENT IN AN ORGANIZED HEALTH CARE EDUCATION/TRAINING PROGRAM

## 2024-03-29 PROCEDURE — 36415 COLL VENOUS BLD VENIPUNCTURE: CPT

## 2024-03-29 PROCEDURE — 6370000000 HC RX 637 (ALT 250 FOR IP): Performed by: PHYSICIAN ASSISTANT

## 2024-03-29 PROCEDURE — 99238 HOSP IP/OBS DSCHRG MGMT 30/<: CPT | Performed by: PHYSICIAN ASSISTANT

## 2024-03-29 PROCEDURE — 94640 AIRWAY INHALATION TREATMENT: CPT

## 2024-03-29 PROCEDURE — 99232 SBSQ HOSP IP/OBS MODERATE 35: CPT | Performed by: INTERNAL MEDICINE

## 2024-03-29 PROCEDURE — 80048 BASIC METABOLIC PNL TOTAL CA: CPT

## 2024-03-29 PROCEDURE — 85027 COMPLETE CBC AUTOMATED: CPT

## 2024-03-29 PROCEDURE — 6370000000 HC RX 637 (ALT 250 FOR IP): Performed by: INTERNAL MEDICINE

## 2024-03-29 RX ORDER — PREDNISONE 20 MG/1
40 TABLET ORAL DAILY
Qty: 4 TABLET | Refills: 0 | Status: SHIPPED | OUTPATIENT
Start: 2024-03-30 | End: 2024-04-01

## 2024-03-29 RX ORDER — UMECLIDINIUM BROMIDE AND VILANTEROL TRIFENATATE 62.5; 25 UG/1; UG/1
1 POWDER RESPIRATORY (INHALATION) DAILY
Qty: 1 EACH | Refills: 3 | Status: SHIPPED | OUTPATIENT
Start: 2024-03-29

## 2024-03-29 RX ORDER — AZITHROMYCIN 250 MG/1
500 TABLET, FILM COATED ORAL DAILY
Status: COMPLETED | OUTPATIENT
Start: 2024-03-29 | End: 2024-03-29

## 2024-03-29 RX ORDER — AZITHROMYCIN 250 MG/1
500 TABLET, FILM COATED ORAL DAILY
Status: DISCONTINUED | OUTPATIENT
Start: 2024-03-29 | End: 2024-03-29

## 2024-03-29 RX ORDER — VARENICLINE TARTRATE 0.5 (11)-1
1 KIT ORAL ONCE
Qty: 53 EACH | Refills: 0 | Status: SHIPPED | OUTPATIENT
Start: 2024-03-29 | End: 2024-03-29

## 2024-03-29 RX ADMIN — PREDNISONE 40 MG: 20 TABLET ORAL at 09:52

## 2024-03-29 RX ADMIN — IPRATROPIUM BROMIDE AND ALBUTEROL SULFATE 1 DOSE: .5; 3 SOLUTION RESPIRATORY (INHALATION) at 09:23

## 2024-03-29 RX ADMIN — IPRATROPIUM BROMIDE AND ALBUTEROL SULFATE 1 DOSE: .5; 3 SOLUTION RESPIRATORY (INHALATION) at 12:12

## 2024-03-29 RX ADMIN — AZITHROMYCIN DIHYDRATE 500 MG: 250 TABLET ORAL at 09:56

## 2024-03-29 RX ADMIN — FAMOTIDINE 20 MG: 20 TABLET, FILM COATED ORAL at 09:52

## 2024-03-29 ASSESSMENT — PAIN SCALES - GENERAL
PAINLEVEL_OUTOF10: 0
PAINLEVEL_OUTOF10: 0

## 2024-03-29 NOTE — PROGRESS NOTES
Patient discharge instructions reviewed with patient. No questions at this time. IV removed. Belongings reviewed. Oxygen delivered to patient bedside. Patient will leave facility via private transportation.

## 2024-03-29 NOTE — CARE COORDINATION
3/29/24, 9:27 AM EDT    Patient goals/plan/ treatment preferences discussed by  and .  Patient goals/plan/ treatment preferences reviewed with patient/ family.  Patient/ family verbalize understanding of discharge plan and are in agreement with goal/plan/treatment preferences.  Understanding was demonstrated using the teach back method.  AVS provided by RN at time of discharge, which includes all necessary medical information pertaining to the patients current course of illness, treatment, post-discharge goals of care, and treatment preferences.     Services At/After Discharge: DME       IMM Letter  IMM Letter given to Patient/Family/Significant other/Guardian/POA/by:: registration  IMM Letter date given:: 03/27/24  IMM Letter time given:: 1657    Met w/ Keshia. She is agreeable with discharge today. Plans to return home w/ SO. Prefers new home O2 through [facility]. VM left requesting callback at 9:25AM.     Spoke with Chidi at [facility]- states paperwork has been received- he is waiting for [facility] respiratory therapist to deliver portable O2 to patient room prior to discharge. Patient updated on status.     12:25PM: Joey- on call for [facility] delivered O2 to patient's room. Provided with orders.     Post-acute (PAC) provider list was provided to patient. Patient was informed of their freedom to choose PAC provider. Discussed and offered to show the patient the relevant PAC Providers quality and resource use measures on Medicare Compare web site via computer based on patient's goals of care and treatment preferences. Questions regarding selection process were answered.

## 2024-03-29 NOTE — PLAN OF CARE
Problem: Respiratory - Adult  Goal: Clear lung sounds  3/29/2024 1042 by Jenni Puri RCP  Outcome: Progressing     Problem: Respiratory - Adult  Goal: Achieves optimal ventilation and oxygenation  3/29/2024 1042 by Jenni Puri RCP  Continue therapy as ordered and maintain clear breath sounds and improve aeration.

## 2024-03-29 NOTE — RT PROTOCOL NOTE
RT Inhaler-Nebulizer Bronchodilator Protocol Note    There is a bronchodilator order in the chart from a provider indicating to follow the RT Bronchodilator Protocol and there is an “Initiate RT Inhaler-Nebulizer Bronchodilator Protocol” order as well (see protocol at bottom of note).    CXR Findings:  No results found.    The findings from the last RT Protocol Assessment were as follows:   History Pulmonary Disease: Chronic pulmonary disease  Respiratory Pattern: Dyspnea on exertion or RR 21-25 bpm  Breath Sounds: Inspiratory and expiratory or bilateral wheezing and/or rhonchi  Cough: Strong, spontaneous, non-productive  Indication for Bronchodilator Therapy: Wheezing associated with pulm disorder  Bronchodilator Assessment Score: 10    Aerosolized bronchodilator medication orders have been revised according to the RT Inhaler-Nebulizer Bronchodilator Protocol below.    Respiratory Therapist to perform RT Therapy Protocol Assessment initially then follow the protocol.  Repeat RT Therapy Protocol Assessment PRN for score 0-3 or on second treatment, BID, and PRN for scores above 3.    No Indications - adjust the frequency to every 6 hours PRN wheezing or bronchospasm, if no treatments needed after 48 hours then discontinue using Per Protocol order mode.     If indication present, adjust the RT bronchodilator orders based on the Bronchodilator Assessment Score as indicated below.  Use Inhaler orders unless patient has one or more of the following: on home nebulizer, not able to hold breath for 10 seconds, is not alert and oriented, cannot activate and use MDI correctly, or respiratory rate 25 breaths per minute or more, then use the equivalent nebulizer order(s) with same Frequency and PRN reasons based on the score.  If a patient is on this medication at home then do not decrease Frequency below that used at home.    0-3 - enter or revise RT bronchodilator order(s) to equivalent RT Bronchodilator order with Frequency  of every 4 hours PRN for wheezing or increased work of breathing using Per Protocol order mode.        4-6 - enter or revise RT Bronchodilator order(s) to two equivalent RT bronchodilator orders with one order with BID Frequency and one order with Frequency of every 4 hours PRN wheezing or increased work of breathing using Per Protocol order mode.        7-10 - enter or revise RT Bronchodilator order(s) to two equivalent RT bronchodilator orders with one order with TID Frequency and one order with Frequency of every 4 hours PRN wheezing or increased work of breathing using Per Protocol order mode.       11-13 - enter or revise RT Bronchodilator order(s) to one equivalent RT bronchodilator order with QID Frequency and an Albuterol order with Frequency of every 4 hours PRN wheezing or increased work of breathing using Per Protocol order mode.      Greater than 13 - enter or revise RT Bronchodilator order(s) to one equivalent RT bronchodilator order with every 4 hours Frequency and an Albuterol order with Frequency of every 2 hours PRN wheezing or increased work of breathing using Per Protocol order mode.     RT to enter RT Home Evaluation for COPD & MDI Assessment order using Per Protocol order mode.    Electronically signed by Melania Arita RCP on 3/28/2024 at 10:24 PM

## 2024-03-29 NOTE — DISCHARGE SUMMARY
Hospital Medicine Discharge Summary      Patient Identification:   Keshia Ramirez   : 1960  MRN: [id number]   Account: [id number]      Patient's PCP: Amador Miles DO    Admit Date: 3/27/2024     Discharge Date: 3/29/24    Admitting Physician: Gurwinder Lechuga MD     Discharge Physician: Javier Cook PA-C     Keshia Ramirez is a 63 y.o. female admitted to [facility] on 3/27/2024.      HPI On Admission From H&P:    \"The patient is a 63-year-old female with a PMH of COPD, HTN, HLD, AAA, CVA, CHAITANYA/MDD, and tobacco abuse presented [facility] ED 3/27/2024 for complaint of shortness of breath.  The patient states that she has had shortness of breath associated with some dyspnea, chills and a clear productive cough or the past few days.  She endorses sick contacts, stating her grandchildren were sick.  Additionally, she continues to smoke.     In the ED, vitals Tmax 97.9, RR 25, , /104, SpO2 85% RA.  Escalated to 2 L NC.  Labs significant for Hgb 17.9, HCT 54.1. proBNP 199, HST 9.  EKG SR with fusion complexes, HOLLIE, , QTc 47.  Influenza A/B and COVID-19 negative.  ABG pH 7.39, pCO2 44, pO2 64, HCO3 26. CXR negative for intrathoracic process.  She was given breathing treatments x 1 and Solu-Medrol 125 mg IV x 1.  The patient admitted for further management evaluation\"    Assessment/Plan With Discharge Diagnoses:    Acute Hypoxic Respiratory Failure Secondary to COPD exacerbation:  Wean supplemental O2 as able, patient qualified for O2 with ambulation at 1 L  Pulmonology consulted inpatient  Patient prescribed anoro ellipta, albuterol inhaler, and prednisone tablets  May f/u with pulmonology outpatient  Home O2 eval performed, patient qualified for 1L of O2 with activity at home.     HTN:   Continue amlodipine 10 mg p.o. daily     HLD:   Continue Lipitor 80 mg p.o. nightly     AAA: Per chart review.     History of CVA:   With no residual deficits     GERD:   Continue

## 2024-03-29 NOTE — PROGRESS NOTES
[facility]      Patient - Keshia Ramirez   MRN -  [id number]   [facility] # - [id number]   - 1960      Date of Admission -  3/27/2024  8:25 AM  Date of evaluation -  3/29/2024  Room - 8A--A   Hospital Day - 2  Consulting - Javier Cook PA-C Primary Care Physician - Amador Miles DO     Problem List      Active Hospital Problems    Diagnosis Date Noted    Acute respiratory failure with hypoxia (HCC) [J96.01] 2024    COPD exacerbation (Former[facility]) [J44.1] 2014     Reason for Consult    COPD exacerbation   HPI   History Obtained From: Patient and electronic medical record.    Keshia Ramirez is a 63 y.o. female with a past medical history of COPD, HTN, HLD, AA , CVA, CHAITANYA/MDD, and current tobacco use who presented to [facility] ED on 3/27/24 with complaints of shortness of breath. Patient states she also has had a productive clear cough, dyspnea, and chills for the last few days. Her grandchildren have been sick. Patient reports not taking any inhalers for last few years.      In ED, she was saturating at 85% and placed on 2L NC. She does not wear oxygen at baseline. ABG showed pH 7.39, PCO2 44, PO2 64, HCO3 26. COVID-19 and Influenza A/B negative. Chest xray showed no acute cardiopulmonary finding. Patient received Solu-Medrol 125 mg IV once and a breathing treatment in the ED.      Of note, patient previously saw Pulmonology, Dr. Goyal last in .    Last 24 hours   -On 1 LPM via NC   -Reports SOB improved  -Still having some rhonchorus cough but wheezing reportedly better   All other systems reviewed.     PMHx   Past Medical History      Diagnosis Date    AAA (abdominal aortic aneurysm) (Formerly [facility]) 2014    Follows with Dr. Molina's group    Chronic pancreatitis (HCC)     COPD (chronic obstructive pulmonary disease) (Formerly [facility])     Dyslipidemia     Essential hypertension 2014    Former smoker     High cholesterol     History of colon polyps      History of stroke with residual deficit; cryptogenic 2017    Follows with neuro and cardio at [facility]    Osteoarthritis       Past Surgical History        Procedure Laterality Date    ABDOMEN SURGERY      c sections x3    CARPAL TUNNEL RELEASE Bilateral      SECTION      x3    COLONOSCOPY      Dr. Catia MCGOWAN  ,,    INSERTABLE CARDIAC MONITOR      TONSILLECTOMY      as child    UPPER GASTROINTESTINAL ENDOSCOPY       Meds    Current Medications    azithromycin  500 mg Oral Daily    ipratropium 0.5 mg-albuterol 2.5 mg  1 Dose Inhalation TID    predniSONE  40 mg Oral Daily    amLODIPine  10 mg Oral Daily    atorvastatin  80 mg Oral Nightly    clopidogrel  75 mg Oral Daily    escitalopram  10 mg Oral Daily    famotidine  20 mg Oral BID    folic acid  1,000 mcg Oral Daily    traZODone  50 mg Oral Nightly    sodium chloride flush  5-40 mL IntraVENous 2 times per day    enoxaparin  40 mg SubCUTAneous Daily     potassium chloride **OR** potassium alternative oral replacement **OR** potassium chloride, magnesium sulfate, sodium phosphate 15 mmol in sodium chloride 0.9 % 250 mL IVPB, glucose, dextrose bolus **OR** dextrose bolus, glucagon (rDNA), dextrose, sodium chloride flush, sodium chloride, ondansetron **OR** ondansetron, polyethylene glycol, acetaminophen **OR** acetaminophen, albuterol  IV Drips/Infusions   dextrose      sodium chloride       Home Medications  Medications Prior to Admission: amLODIPine (NORVASC) 10 MG tablet, take 1 tablet by mouth once daily  escitalopram (LEXAPRO) 10 MG tablet, take 1 tablet by mouth once daily  atorvastatin (LIPITOR) 80 MG tablet, take 1 tablet by mouth once daily at bedtime  folic acid (FOLVITE) 1 MG tablet, take 1 tablet by mouth once daily  [DISCONTINUED] varenicline (CHANTIX CONTINUING MONTH ) 1 MG tablet, Take 1 tablet by mouth 2 times daily  [DISCONTINUED] Varenicline Tartrate, Starter, (CHANTIX STARTING MONTH ) 0.5 MG X 11 & 1 MG X

## 2024-03-29 NOTE — RT PROTOCOL NOTE
RT Inhaler-Nebulizer Bronchodilator Protocol Note    There is a bronchodilator order in the chart from a provider indicating to follow the RT Bronchodilator Protocol and there is an “Initiate RT Inhaler-Nebulizer Bronchodilator Protocol” order as well (see protocol at bottom of note).    CXR Findings:  No results found.    The findings from the last RT Protocol Assessment were as follows:   History Pulmonary Disease: Chronic pulmonary disease  Respiratory Pattern: Regular pattern and RR 12-20 bpm  Breath Sounds: Intermittent or unilateral wheezes  Cough: Strong, spontaneous, non-productive  Indication for Bronchodilator Therapy: Wheezing associated with pulm disorder  Bronchodilator Assessment Score: 6    Aerosolized bronchodilator medication orders have been revised according to the RT Inhaler-Nebulizer Bronchodilator Protocol below.    Respiratory Therapist to perform RT Therapy Protocol Assessment initially then follow the protocol.  Repeat RT Therapy Protocol Assessment PRN for score 0-3 or on second treatment, BID, and PRN for scores above 3.    No Indications - adjust the frequency to every 6 hours PRN wheezing or bronchospasm, if no treatments needed after 48 hours then discontinue using Per Protocol order mode.     If indication present, adjust the RT bronchodilator orders based on the Bronchodilator Assessment Score as indicated below.  Use Inhaler orders unless patient has one or more of the following: on home nebulizer, not able to hold breath for 10 seconds, is not alert and oriented, cannot activate and use MDI correctly, or respiratory rate 25 breaths per minute or more, then use the equivalent nebulizer order(s) with same Frequency and PRN reasons based on the score.  If a patient is on this medication at home then do not decrease Frequency below that used at home.    0-3 - enter or revise RT bronchodilator order(s) to equivalent RT Bronchodilator order with Frequency of every 4 hours PRN for wheezing

## 2024-03-29 NOTE — PLAN OF CARE
Problem: Respiratory - Adult  Goal: Clear lung sounds  Outcome: Progressing  Goal: Achieves optimal ventilation and oxygenation  Outcome: Progressing     Problem: Skin/Tissue Integrity - Adult  Goal: Skin integrity remains intact  Outcome: Progressing

## 2024-03-31 NOTE — PATIENT INSTRUCTIONS
LAB INSTRUCTIONS:    Please complete labs within 4 week(s).    Please fast for 8 hours prior to lab collection.    The clinic will call you within 1 week of collection. If you have not heard from us within that amount of time, please call us at [phone].

## 2024-04-01 ENCOUNTER — OFFICE VISIT (OUTPATIENT)
Dept: FAMILY MEDICINE CLINIC | Age: 64
End: 2024-04-01

## 2024-04-01 ENCOUNTER — TELEPHONE (OUTPATIENT)
Dept: FAMILY MEDICINE CLINIC | Age: 64
End: 2024-04-01

## 2024-04-01 VITALS
OXYGEN SATURATION: 94 % | BODY MASS INDEX: 25.87 KG/M2 | HEART RATE: 74 BPM | HEIGHT: 63 IN | WEIGHT: 146 LBS | TEMPERATURE: 97.6 F | DIASTOLIC BLOOD PRESSURE: 68 MMHG | RESPIRATION RATE: 18 BRPM | SYSTOLIC BLOOD PRESSURE: 126 MMHG

## 2024-04-01 DIAGNOSIS — I71.43 INFRARENAL ABDOMINAL AORTIC ANEURYSM (AAA) WITHOUT RUPTURE (HCC): ICD-10-CM

## 2024-04-01 DIAGNOSIS — F17.210 CIGARETTE NICOTINE DEPENDENCE WITHOUT COMPLICATION: ICD-10-CM

## 2024-04-01 DIAGNOSIS — Z23 NEED FOR PNEUMOCOCCAL VACCINATION: ICD-10-CM

## 2024-04-01 DIAGNOSIS — R73.9 HYPERGLYCEMIA: ICD-10-CM

## 2024-04-01 DIAGNOSIS — G89.29 CHRONIC EPIGASTRIC PAIN: ICD-10-CM

## 2024-04-01 DIAGNOSIS — K86.1 CHRONIC PANCREATITIS, UNSPECIFIED PANCREATITIS TYPE (HCC): ICD-10-CM

## 2024-04-01 DIAGNOSIS — Z12.31 ENCOUNTER FOR SCREENING MAMMOGRAM FOR MALIGNANT NEOPLASM OF BREAST: ICD-10-CM

## 2024-04-01 DIAGNOSIS — R73.03 PREDIABETES: Chronic | ICD-10-CM

## 2024-04-01 DIAGNOSIS — J96.01 ACUTE RESPIRATORY FAILURE WITH HYPOXIA (HCC): ICD-10-CM

## 2024-04-01 DIAGNOSIS — I10 ESSENTIAL HYPERTENSION: ICD-10-CM

## 2024-04-01 DIAGNOSIS — J44.1 CHRONIC OBSTRUCTIVE PULMONARY DISEASE WITH ACUTE EXACERBATION (HCC): Primary | Chronic | ICD-10-CM

## 2024-04-01 DIAGNOSIS — R41.3 MEMORY LOSS: ICD-10-CM

## 2024-04-01 DIAGNOSIS — Z78.0 POST-MENOPAUSE: ICD-10-CM

## 2024-04-01 DIAGNOSIS — F33.42 RECURRENT MAJOR DEPRESSIVE DISORDER, IN FULL REMISSION (HCC): ICD-10-CM

## 2024-04-01 DIAGNOSIS — E78.5 DYSLIPIDEMIA: Chronic | ICD-10-CM

## 2024-04-01 DIAGNOSIS — K21.9 GASTROESOPHAGEAL REFLUX DISEASE WITHOUT ESOPHAGITIS: ICD-10-CM

## 2024-04-01 DIAGNOSIS — R11.0 CHRONIC NAUSEA: ICD-10-CM

## 2024-04-01 DIAGNOSIS — G47.00 INSOMNIA, UNSPECIFIED TYPE: ICD-10-CM

## 2024-04-01 DIAGNOSIS — R63.4 WEIGHT LOSS, UNINTENTIONAL: ICD-10-CM

## 2024-04-01 DIAGNOSIS — I69.30 HISTORY OF STROKE WITH RESIDUAL DEFICIT: Chronic | ICD-10-CM

## 2024-04-01 DIAGNOSIS — R10.13 CHRONIC EPIGASTRIC PAIN: ICD-10-CM

## 2024-04-01 SDOH — ECONOMIC STABILITY: FOOD INSECURITY: WITHIN THE PAST 12 MONTHS, YOU WORRIED THAT YOUR FOOD WOULD RUN OUT BEFORE YOU GOT MONEY TO BUY MORE.: NEVER TRUE

## 2024-04-01 SDOH — ECONOMIC STABILITY: FOOD INSECURITY: WITHIN THE PAST 12 MONTHS, THE FOOD YOU BOUGHT JUST DIDN'T LAST AND YOU DIDN'T HAVE MONEY TO GET MORE.: NEVER TRUE

## 2024-04-01 SDOH — ECONOMIC STABILITY: INCOME INSECURITY: HOW HARD IS IT FOR YOU TO PAY FOR THE VERY BASICS LIKE FOOD, HOUSING, MEDICAL CARE, AND HEATING?: NOT HARD AT ALL

## 2024-04-01 ASSESSMENT — PATIENT HEALTH QUESTIONNAIRE - PHQ9
SUM OF ALL RESPONSES TO PHQ9 QUESTIONS 1 & 2: 0
SUM OF ALL RESPONSES TO PHQ QUESTIONS 1-9: 1
1. LITTLE INTEREST OR PLEASURE IN DOING THINGS: NOT AT ALL
6. FEELING BAD ABOUT YOURSELF - OR THAT YOU ARE A FAILURE OR HAVE LET YOURSELF OR YOUR FAMILY DOWN: NOT AT ALL
10. IF YOU CHECKED OFF ANY PROBLEMS, HOW DIFFICULT HAVE THESE PROBLEMS MADE IT FOR YOU TO DO YOUR WORK, TAKE CARE OF THINGS AT HOME, OR GET ALONG WITH OTHER PEOPLE: NOT DIFFICULT AT ALL
4. FEELING TIRED OR HAVING LITTLE ENERGY: NOT AT ALL
SUM OF ALL RESPONSES TO PHQ QUESTIONS 1-9: 1
SUM OF ALL RESPONSES TO PHQ QUESTIONS 1-9: 1
9. THOUGHTS THAT YOU WOULD BE BETTER OFF DEAD, OR OF HURTING YOURSELF: NOT AT ALL
3. TROUBLE FALLING OR STAYING ASLEEP: NOT AT ALL
SUM OF ALL RESPONSES TO PHQ QUESTIONS 1-9: 1
8. MOVING OR SPEAKING SO SLOWLY THAT OTHER PEOPLE COULD HAVE NOTICED. OR THE OPPOSITE, BEING SO FIGETY OR RESTLESS THAT YOU HAVE BEEN MOVING AROUND A LOT MORE THAN USUAL: NOT AT ALL
2. FEELING DOWN, DEPRESSED OR HOPELESS: NOT AT ALL
5. POOR APPETITE OR OVEREATING: SEVERAL DAYS
7. TROUBLE CONCENTRATING ON THINGS, SUCH AS READING THE NEWSPAPER OR WATCHING TELEVISION: NOT AT ALL

## 2024-04-01 NOTE — TELEPHONE ENCOUNTER
Care Transitions Initial Follow Up Call    Outreach made within 2 business days of discharge: Yes    Patient: Keshia Ramirez Patient : 1960   MRN: [id number]  Reason for Admission: There are no discharge diagnoses documented for the most recent discharge.  Discharge Date: 3/29/24       Spoke with: Keshia     Discharge department/facility: [facility]     TCM Interactive Patient Contact:  Was patient able to fill all prescriptions: Yes  Was patient instructed to bring all medications to the follow-up visit: Yes  Is patient taking all medications as directed in the discharge summary? Yes  Does patient understand their discharge instructions: Yes  Does patient have questions or concerns that need addressed prior to 7-14 day follow up office visit: no    Scheduled appointment with PCP within 7-14 days    Follow Up  Future Appointments   Date Time Provider Department Center   2024  9:00 AM Amador Miles,  [facility] [place]   2024  9:30 AM STR CT IMAGING [facility]  [facility] EXP STR Rad/Card   2024 10:00 AM Palmira Fraga, APRN - CNP [facility] [place]       Leni Rice LPN

## 2024-04-01 NOTE — PROGRESS NOTES
Immunization(s) given during visit:    Immunizations Administered       Name Date Dose Route    Pneumococcal, PCV20, PREVNAR 20, (age 6w+), IM, 0.5mL 4/1/2024 0.5 mL Intramuscular    Site: Deltoid- Right    Lot: [id number]    NDC: 4035-3755-31            Most recent Vaccine Information Sheet dated 5.12.2023 given to pt  
[facility] Transitions Initial Follow Up Call    Outreach made within 2 business days of discharge: Yes    Patient: Keshia Ramirez Patient : 1960   MRN: [id number]  Reason for Admission: There are no discharge diagnoses documented for the most recent discharge.  Discharge Date: 3/29/24       Spoke with: Patient     Discharge department/facility: [facility]    TCM Interactive Patient Contact:  Was patient able to fill all prescriptions: Yes  Was patient instructed to bring all medications to the follow-up visit: Yes  Is patient taking all medications as directed in the discharge summary? Yes  Does patient understand their discharge instructions: Yes  Does patient have questions or concerns that need addressed prior to 7-14 day follow up office visit: no    Scheduled appointment with PCP within 7-14 days    Follow Up  Future Appointments   Date Time Provider Department Center   2024  9:00 AM Amador Miles,  [facility] [place]   2024  9:30 AM STR CT IMAGING RM1  [facility] EXP STR Rad/Card   2024 10:00 AM Palmira Fraga, APRN - CNP N Pulm Med [facility] [place]       Yasmeen Bruce MA   
DISCHARGE MEDS RECONCILED W/ CURRENT OUTPATIENT MED LIST    9. Insomnia, unspecified type    - PA DISCHARGE MEDS RECONCILED W/ CURRENT OUTPATIENT MED LIST    10. History of stroke with residual deficit; cryptogenic    Stable  Con't tertiary prevention    - PA DISCHARGE MEDS RECONCILED W/ CURRENT OUTPATIENT MED LIST    11. Essential hypertension    At goal  con't norvas  Labs ordered    - CBC with Auto Differential; Future  - Comprehensive Metabolic Panel; Future  - Hemoglobin A1C; Future  - Lipid Panel; Future  - TSH with Reflex; Future  - Microalbumin / Creatinine Urine Ratio; Future  - PA DISCHARGE MEDS RECONCILED W/ CURRENT OUTPATIENT MED LIST    12. Dyslipidemia    Stable  Con't Lipitor    - PA DISCHARGE MEDS RECONCILED W/ CURRENT OUTPATIENT MED LIST    13. Infrarenal abdominal aortic aneurysm (AAA) without rupture (HCC)    Con't RF mod  Due for f/u US, ordered    - US ABDOMINAL AORTA LIMITED; Future  - PA DISCHARGE MEDS RECONCILED W/ CURRENT OUTPATIENT MED LIST    14. Prediabetes    Stable  Con't TLCs  Labs ordered    - Hemoglobin A1C; Future  - PA DISCHARGE MEDS RECONCILED W/ CURRENT OUTPATIENT MED LIST    15. Hyperglycemia    - Hemoglobin A1C; Future  - PA DISCHARGE MEDS RECONCILED W/ CURRENT OUTPATIENT MED LIST    16. Memory loss    Check labs  F/u 4 wks, KAYLYN at that visit    - RPR; Future  - Vitamin B12; Future    17. Cigarette nicotine dependence without complication    In early remission   Chantix  Reassess 4 wks    - PA DISCHARGE MEDS RECONCILED W/ CURRENT OUTPATIENT MED LIST    18. Encounter for screening mammogram for malignant neoplasm of breast    - VAZQUEZ JAKE DIGITAL SCREEN BILATERAL; Future  - PA DISCHARGE MEDS RECONCILED W/ CURRENT OUTPATIENT MED LIST    19. Post-menopause    - DEXA BONE DENSITY AXIAL SKELETON; Future  - PA DISCHARGE MEDS RECONCILED W/ CURRENT OUTPATIENT MED LIST    20. Need for pneumococcal vaccination    - Pneumococcal, PCV20, PREVNAR 20, (age 6w+), IM,

## 2024-04-04 LAB
EKG ATRIAL RATE: 100 BPM
EKG P AXIS: 84 DEGREES
EKG P-R INTERVAL: 144 MS
EKG Q-T INTERVAL: 378 MS
EKG QRS DURATION: 116 MS
EKG QTC CALCULATION (BAZETT): 487 MS
EKG R AXIS: 26 DEGREES
EKG T AXIS: 100 DEGREES
EKG VENTRICULAR RATE: 100 BPM

## 2024-04-20 DIAGNOSIS — I10 ESSENTIAL HYPERTENSION: ICD-10-CM

## 2024-04-22 RX ORDER — AMLODIPINE BESYLATE 10 MG/1
TABLET ORAL
Qty: 90 TABLET | Refills: 3 | Status: SHIPPED | OUTPATIENT
Start: 2024-04-22

## 2024-04-22 NOTE — TELEPHONE ENCOUNTER
Recent Visits  Date Type Provider Dept   04/01/24 Office Visit Amador Miles, DO [facility]   08/30/23 Office Visit Khadar Moyer APRN - CNP [facility]   04/05/23 Office Visit Amador Miles, DO [facility]   02/27/23 Office Visit Amador Miles, DO [facility]   01/30/23 Office Visit Amador Miles, DO [facility]   11/08/22 Office Visit Amador Miles, DO [facility]   Showing recent visits within past 540 days with a meds authorizing provider and meeting all other requirements  Future Appointments  Date Type Provider Dept   05/01/24 Appointment Amador Miles, DO [facility]   Showing future appointments within next 150 days with a meds authorizing provider and meeting all other requirements

## 2024-05-08 ENCOUNTER — HOSPITAL ENCOUNTER (OUTPATIENT)
Dept: ULTRASOUND IMAGING | Age: 64
Discharge: HOME | End: 2024-05-08
Attending: FAMILY MEDICINE
Payer: MEDICARE

## 2024-05-08 ENCOUNTER — HOSPITAL ENCOUNTER (OUTPATIENT)
Dept: WOMENS IMAGING | Age: 64
Discharge: HOME | End: 2024-05-08
Attending: FAMILY MEDICINE
Payer: MEDICARE

## 2024-05-08 ENCOUNTER — HOSPITAL ENCOUNTER (OUTPATIENT)
Dept: CT IMAGING | Age: 64
Discharge: HOME | End: 2024-05-08
Attending: STUDENT IN AN ORGANIZED HEALTH CARE EDUCATION/TRAINING PROGRAM
Payer: MEDICARE

## 2024-05-08 ENCOUNTER — HOSPITAL ENCOUNTER (OUTPATIENT)
Age: 64
Discharge: HOME | End: 2024-05-08
Attending: FAMILY MEDICINE
Payer: MEDICARE

## 2024-05-08 VITALS — BODY MASS INDEX: 26.58 KG/M2 | WEIGHT: 150 LBS

## 2024-05-08 DIAGNOSIS — Z12.31 ENCOUNTER FOR SCREENING MAMMOGRAM FOR MALIGNANT NEOPLASM OF BREAST: ICD-10-CM

## 2024-05-08 DIAGNOSIS — I71.43 INFRARENAL ABDOMINAL AORTIC ANEURYSM (AAA) WITHOUT RUPTURE (HCC): ICD-10-CM

## 2024-05-08 DIAGNOSIS — I10 ESSENTIAL HYPERTENSION: ICD-10-CM

## 2024-05-08 DIAGNOSIS — Z78.0 POST-MENOPAUSE: ICD-10-CM

## 2024-05-08 DIAGNOSIS — R73.9 HYPERGLYCEMIA: ICD-10-CM

## 2024-05-08 DIAGNOSIS — F17.210 SMOKING GREATER THAN 20 PACK YEARS: ICD-10-CM

## 2024-05-08 DIAGNOSIS — R73.03 PREDIABETES: Chronic | ICD-10-CM

## 2024-05-08 LAB
ALBUMIN SERPL BCG-MCNC: 3.9 G/DL (ref 3.5–5.1)
ALP SERPL-CCNC: 103 U/L (ref 38–126)
ALT SERPL W/O P-5'-P-CCNC: 8 U/L (ref 11–66)
ANION GAP SERPL CALC-SCNC: 10 MEQ/L (ref 8–16)
AST SERPL-CCNC: 11 U/L (ref 5–40)
BASOPHILS ABSOLUTE: 0 THOU/MM3 (ref 0–0.1)
BASOPHILS NFR BLD AUTO: 0.6 %
BILIRUB SERPL-MCNC: 0.4 MG/DL (ref 0.3–1.2)
BUN SERPL-MCNC: 10 MG/DL (ref 7–22)
CALCIUM SERPL-MCNC: 9.3 MG/DL (ref 8.5–10.5)
CHLORIDE SERPL-SCNC: 106 MEQ/L (ref 98–111)
CHOLEST SERPL-MCNC: 126 MG/DL (ref 100–199)
CO2 SERPL-SCNC: 29 MEQ/L (ref 23–33)
CREAT SERPL-MCNC: 0.8 MG/DL (ref 0.4–1.2)
CREAT UR-MCNC: 108.7 MG/DL
DEPRECATED MEAN GLUCOSE BLD GHB EST-ACNC: 108 MG/DL (ref 70–126)
DEPRECATED RDW RBC AUTO: 49.5 FL (ref 35–45)
EOSINOPHIL NFR BLD AUTO: 1.1 %
EOSINOPHILS ABSOLUTE: 0.1 THOU/MM3 (ref 0–0.4)
ERYTHROCYTE [DISTWIDTH] IN BLOOD BY AUTOMATED COUNT: 14.2 % (ref 11.5–14.5)
GFR SERPL CREATININE-BSD FRML MDRD: 81 ML/MIN/1.73M2
GLUCOSE SERPL-MCNC: 86 MG/DL (ref 70–108)
HBA1C MFR BLD HPLC: 5.6 % (ref 4.4–6.4)
HCT VFR BLD AUTO: 45.5 % (ref 37–47)
HDLC SERPL-MCNC: 61 MG/DL
HGB BLD-MCNC: 14.6 GM/DL (ref 12–16)
IMM GRANULOCYTES # BLD AUTO: 0.01 THOU/MM3 (ref 0–0.07)
IMM GRANULOCYTES NFR BLD AUTO: 0.2 %
LDLC SERPL CALC-MCNC: 46 MG/DL
LYMPHOCYTES ABSOLUTE: 1.8 THOU/MM3 (ref 1–4.8)
LYMPHOCYTES NFR BLD AUTO: 28.6 %
MCH RBC QN AUTO: 30.4 PG (ref 26–33)
MCHC RBC AUTO-ENTMCNC: 32.1 GM/DL (ref 32.2–35.5)
MCV RBC AUTO: 94.8 FL (ref 81–99)
MICROALBUMIN UR-MCNC: < 1.2 MG/DL
MICROALBUMIN/CREAT RATIO PNL UR: 11 MG/G (ref 0–30)
MONOCYTES ABSOLUTE: 0.5 THOU/MM3 (ref 0.4–1.3)
MONOCYTES NFR BLD AUTO: 7.8 %
NEUTROPHILS ABSOLUTE: 3.9 THOU/MM3 (ref 1.8–7.7)
NEUTROPHILS NFR BLD AUTO: 61.7 %
NRBC BLD AUTO-RTO: 0 /100 WBC
PLATELET # BLD AUTO: 179 THOU/MM3 (ref 130–400)
PMV BLD AUTO: 10.9 FL (ref 9.4–12.4)
POTASSIUM SERPL-SCNC: 4.2 MEQ/L (ref 3.5–5.2)
PROT SERPL-MCNC: 6.5 G/DL (ref 6.1–8)
RBC # BLD AUTO: 4.8 MILL/MM3 (ref 4.2–5.4)
SODIUM SERPL-SCNC: 145 MEQ/L (ref 135–145)
TRIGL SERPL-MCNC: 94 MG/DL (ref 0–199)
TSH SERPL DL<=0.005 MIU/L-ACNC: 2.42 UIU/ML (ref 0.4–4.2)
WBC # BLD AUTO: 6.3 THOU/MM3 (ref 4.8–10.8)

## 2024-05-08 PROCEDURE — 76775 US EXAM ABDO BACK WALL LIM: CPT

## 2024-05-08 PROCEDURE — 82607 VITAMIN B-12: CPT

## 2024-05-08 PROCEDURE — 80053 COMPREHEN METABOLIC PANEL: CPT

## 2024-05-08 PROCEDURE — 80061 LIPID PANEL: CPT

## 2024-05-08 PROCEDURE — 84443 ASSAY THYROID STIM HORMONE: CPT

## 2024-05-08 PROCEDURE — 77080 DXA BONE DENSITY AXIAL: CPT

## 2024-05-08 PROCEDURE — 77063 BREAST TOMOSYNTHESIS BI: CPT

## 2024-05-08 PROCEDURE — 71271 CT THORAX LUNG CANCER SCR C-: CPT

## 2024-05-08 PROCEDURE — 36415 COLL VENOUS BLD VENIPUNCTURE: CPT

## 2024-05-08 PROCEDURE — 83036 HEMOGLOBIN GLYCOSYLATED A1C: CPT

## 2024-05-08 PROCEDURE — 86592 SYPHILIS TEST NON-TREP QUAL: CPT

## 2024-05-08 PROCEDURE — 85025 COMPLETE CBC W/AUTO DIFF WBC: CPT

## 2024-05-08 PROCEDURE — 82043 UR ALBUMIN QUANTITATIVE: CPT

## 2024-05-09 ENCOUNTER — TELEPHONE (OUTPATIENT)
Dept: FAMILY MEDICINE CLINIC | Age: 64
End: 2024-05-09

## 2024-05-09 LAB
RPR SER QL: NONREACTIVE
VIT B12 SERPL-MCNC: 297 PG/ML (ref 211–911)

## 2024-05-09 NOTE — TELEPHONE ENCOUNTER
----- Message from Amador Miles, DO sent at 5/9/2024  5:25 AM EDT -----  Please let pt know that lab work looks good. There were 2 other labs, a Vit B12 and and RPR that were also to be done, but not run by the lab. Please f/u with the lab and see what those were not run. Pt still needs to get those done, fasting.     US of her abdominal aortic aneurysm is stable at 3.6 CM, will plan to repeat that in 2 years    Let me know if questions, thanks!

## 2024-05-09 NOTE — TELEPHONE ENCOUNTER
Spoke to Allie in lab and she said they must've been missed. But she can add both to the labs that were drawn.   Orders faxed to lab to add them    V.M not set up / unable to leave message

## 2024-05-10 ENCOUNTER — TELEPHONE (OUTPATIENT)
Dept: FAMILY MEDICINE CLINIC | Age: 64
End: 2024-05-10

## 2024-05-10 DIAGNOSIS — M85.80 OSTEOPENIA, UNSPECIFIED LOCATION: Primary | ICD-10-CM

## 2024-05-10 RX ORDER — CALCIUM CARBONATE/VITAMIN D3 500 MG-10
1 TABLET ORAL 2 TIMES DAILY
Qty: 180 TABLET | Refills: 3 | Status: SHIPPED | OUTPATIENT
Start: 2024-05-10

## 2024-05-10 NOTE — TELEPHONE ENCOUNTER
Called and spoke to patient, patient voiced understanding of the results below. Patient denied ABUS at this time.    Patient will check on medication at the pharmacy.    Patient had no questions or concerns at this time.

## 2024-05-10 NOTE — TELEPHONE ENCOUNTER
----- Message from Amador Miles DO sent at 5/10/2024  6:39 AM EDT -----  -Please let pt know that mammogram is normal.   She does have dense breast tissue, which is a common finding, and not abnormal.    This can lower the sensitivity of mammograms.   Given the finding of dense breast tissue, this patient is a candidate for a new imaging test called automated whole breast screening ultrasound (ABUS) to aid in breast cancer detection.  If would like to pursue this, I can order. Let me know.     -DEXA shows mild thinning of the bones called osteopenia. Recommend calcium with Vit D 500/400 twice daily. Is OTC, but rx sent in case insurance will cover. Repeat DEXA 2 years.     -B12 level/RPR WNL    Let me know if questions, thanks!

## 2024-05-11 DIAGNOSIS — F33.42 RECURRENT MAJOR DEPRESSIVE DISORDER, IN FULL REMISSION (HCC): ICD-10-CM

## 2024-05-11 DIAGNOSIS — G47.00 INSOMNIA, UNSPECIFIED TYPE: ICD-10-CM

## 2024-05-13 RX ORDER — TRAZODONE HYDROCHLORIDE 50 MG/1
TABLET ORAL
Qty: 90 TABLET | Refills: 3 | Status: SHIPPED | OUTPATIENT
Start: 2024-05-13

## 2024-05-13 RX ORDER — ESCITALOPRAM OXALATE 10 MG/1
10 TABLET ORAL DAILY
Qty: 90 TABLET | Refills: 3 | Status: SHIPPED | OUTPATIENT
Start: 2024-05-13

## 2024-05-13 NOTE — TELEPHONE ENCOUNTER
Future Appointments   Date Time Provider Department Center   5/16/2024 10:20 AM Amador Miles,  [facility] [place]   5/16/2024  1:40 PM Palmira Fraga, APRN - CNP [facility] [place]

## 2024-05-15 NOTE — PROGRESS NOTES
(HCC)    Stable  Con't Lexapro and Trazodone    9. Insomnia, unspecified type    As above    10. History of stroke with residual deficit; cryptogenic    Stable  Con't tertiary prevention    11. Essential hypertension    At goal  con't norvasc    12. Dyslipidemia    table  Con't Lipitor    13. Infrarenal abdominal aortic aneurysm (AAA) without rupture (HCC)    Stable  RF mod  US in 2 years from May 2024    14. Prediabetes    Con't TLC's  Labs in 6 months    15. Hyperglycemia      16. Memory loss    Normal labs, KAYLYN  Monitor.   Pt reassured    17. Osteopenia, unspecified location    Mild  Low FRAX  Tylor with D  Dexa in 2 yeras    18. Cigarette nicotine dependence without complication    In precontemplation stage and not ready to quit. Declines cessation, aware of risks of continued smoking as well as resources available to help quit. These include tobacco cessation classes as well as 1-800-QUIT NOW. No barriers other than lack of desire. 3+ min spent counseling.       DISPOSITION    Return in about 6 months (around 11/16/2024) for follow-up on chronic medical conditions, sooner as needed.    Keshia released with restrictions.    Future Appointments   Date Time Provider Department Center   5/16/2024  1:40 PM Palmira Fraga, APRN - CNP [facility] [place]   11/14/2024 10:20 AM Amador Miles DO [facility] [place]     PATIENT COUNSELING    Counseling was provided today regarding the following topics: Healthy eating habits, Regular exercise, substance abuse and healthy sleep habits.     Barriers to learning and self management: none    Discussed use, benefit, and side effects of prescribed medications.  Barriers to medication compliance addressed.  All patient questions answered.  Pt voiced understanding.       Electronically signed by Amador Miles DO on 5/16/2024 at 10:48 AM        Medicare Annual Wellness Visit    Keshia Ramirez is here for Medicare AWV and Follow-up (Issues noted

## 2024-05-16 ENCOUNTER — OFFICE VISIT (OUTPATIENT)
Dept: FAMILY MEDICINE CLINIC | Age: 64
End: 2024-05-16
Payer: MEDICARE

## 2024-05-16 ENCOUNTER — OFFICE VISIT (OUTPATIENT)
Dept: PULMONOLOGY | Age: 64
End: 2024-05-16

## 2024-05-16 VITALS
OXYGEN SATURATION: 97 % | HEIGHT: 65 IN | BODY MASS INDEX: 25.06 KG/M2 | SYSTOLIC BLOOD PRESSURE: 128 MMHG | HEART RATE: 64 BPM | DIASTOLIC BLOOD PRESSURE: 60 MMHG | TEMPERATURE: 98.3 F | WEIGHT: 150.4 LBS

## 2024-05-16 VITALS
TEMPERATURE: 97.7 F | HEART RATE: 70 BPM | HEIGHT: 63 IN | BODY MASS INDEX: 26.93 KG/M2 | WEIGHT: 152 LBS | DIASTOLIC BLOOD PRESSURE: 70 MMHG | OXYGEN SATURATION: 98 % | SYSTOLIC BLOOD PRESSURE: 124 MMHG | RESPIRATION RATE: 18 BRPM

## 2024-05-16 DIAGNOSIS — M85.80 OSTEOPENIA, UNSPECIFIED LOCATION: ICD-10-CM

## 2024-05-16 DIAGNOSIS — R73.03 PREDIABETES: Chronic | ICD-10-CM

## 2024-05-16 DIAGNOSIS — I69.30 HISTORY OF STROKE WITH RESIDUAL DEFICIT: Chronic | ICD-10-CM

## 2024-05-16 DIAGNOSIS — I71.43 INFRARENAL ABDOMINAL AORTIC ANEURYSM (AAA) WITHOUT RUPTURE (HCC): ICD-10-CM

## 2024-05-16 DIAGNOSIS — K86.1 CHRONIC PANCREATITIS, UNSPECIFIED PANCREATITIS TYPE (HCC): ICD-10-CM

## 2024-05-16 DIAGNOSIS — R10.13 CHRONIC EPIGASTRIC PAIN: ICD-10-CM

## 2024-05-16 DIAGNOSIS — I10 ESSENTIAL HYPERTENSION: Chronic | ICD-10-CM

## 2024-05-16 DIAGNOSIS — G89.29 CHRONIC EPIGASTRIC PAIN: ICD-10-CM

## 2024-05-16 DIAGNOSIS — J44.9 CHRONIC OBSTRUCTIVE PULMONARY DISEASE, UNSPECIFIED COPD TYPE (HCC): ICD-10-CM

## 2024-05-16 DIAGNOSIS — G47.00 INSOMNIA, UNSPECIFIED TYPE: ICD-10-CM

## 2024-05-16 DIAGNOSIS — E78.5 DYSLIPIDEMIA: Chronic | ICD-10-CM

## 2024-05-16 DIAGNOSIS — F17.210 CIGARETTE NICOTINE DEPENDENCE WITHOUT COMPLICATION: ICD-10-CM

## 2024-05-16 DIAGNOSIS — Z09 HOSPITAL DISCHARGE FOLLOW-UP: ICD-10-CM

## 2024-05-16 DIAGNOSIS — R73.9 HYPERGLYCEMIA: ICD-10-CM

## 2024-05-16 DIAGNOSIS — K21.9 GASTROESOPHAGEAL REFLUX DISEASE WITHOUT ESOPHAGITIS: ICD-10-CM

## 2024-05-16 DIAGNOSIS — Z00.00 MEDICARE ANNUAL WELLNESS VISIT, SUBSEQUENT: Primary | ICD-10-CM

## 2024-05-16 DIAGNOSIS — J96.01 ACUTE RESPIRATORY FAILURE WITH HYPOXIA (HCC): ICD-10-CM

## 2024-05-16 DIAGNOSIS — F33.42 RECURRENT MAJOR DEPRESSIVE DISORDER, IN FULL REMISSION (HCC): ICD-10-CM

## 2024-05-16 DIAGNOSIS — R63.4 WEIGHT LOSS, UNINTENTIONAL: ICD-10-CM

## 2024-05-16 DIAGNOSIS — R11.0 CHRONIC NAUSEA: ICD-10-CM

## 2024-05-16 DIAGNOSIS — R41.3 MEMORY LOSS: ICD-10-CM

## 2024-05-16 DIAGNOSIS — Z87.891 PERSONAL HISTORY OF TOBACCO USE: ICD-10-CM

## 2024-05-16 DIAGNOSIS — J44.9 MODERATE COPD (CHRONIC OBSTRUCTIVE PULMONARY DISEASE) (HCC): Primary | ICD-10-CM

## 2024-05-16 PROCEDURE — 3017F COLORECTAL CA SCREEN DOC REV: CPT | Performed by: FAMILY MEDICINE

## 2024-05-16 PROCEDURE — 3074F SYST BP LT 130 MM HG: CPT | Performed by: FAMILY MEDICINE

## 2024-05-16 PROCEDURE — 3078F DIAST BP <80 MM HG: CPT | Performed by: FAMILY MEDICINE

## 2024-05-16 PROCEDURE — G0439 PPPS, SUBSEQ VISIT: HCPCS | Performed by: FAMILY MEDICINE

## 2024-05-16 PROCEDURE — 1123F ACP DISCUSS/DSCN MKR DOCD: CPT | Performed by: FAMILY MEDICINE

## 2024-05-16 RX ORDER — ALBUTEROL SULFATE 90 UG/1
2 AEROSOL, METERED RESPIRATORY (INHALATION) EVERY 4 HOURS PRN
Qty: 54 G | Refills: 3 | Status: SHIPPED | OUTPATIENT
Start: 2024-05-16

## 2024-05-16 ASSESSMENT — PATIENT HEALTH QUESTIONNAIRE - PHQ9
9. THOUGHTS THAT YOU WOULD BE BETTER OFF DEAD, OR OF HURTING YOURSELF: NOT AT ALL
6. FEELING BAD ABOUT YOURSELF - OR THAT YOU ARE A FAILURE OR HAVE LET YOURSELF OR YOUR FAMILY DOWN: NOT AT ALL
5. POOR APPETITE OR OVEREATING: NOT AT ALL
SUM OF ALL RESPONSES TO PHQ QUESTIONS 1-9: 0
SUM OF ALL RESPONSES TO PHQ QUESTIONS 1-9: 0
7. TROUBLE CONCENTRATING ON THINGS, SUCH AS READING THE NEWSPAPER OR WATCHING TELEVISION: NOT AT ALL
4. FEELING TIRED OR HAVING LITTLE ENERGY: NOT AT ALL
SUM OF ALL RESPONSES TO PHQ QUESTIONS 1-9: 0
3. TROUBLE FALLING OR STAYING ASLEEP: NOT AT ALL
SUM OF ALL RESPONSES TO PHQ QUESTIONS 1-9: 0
10. IF YOU CHECKED OFF ANY PROBLEMS, HOW DIFFICULT HAVE THESE PROBLEMS MADE IT FOR YOU TO DO YOUR WORK, TAKE CARE OF THINGS AT HOME, OR GET ALONG WITH OTHER PEOPLE: NOT DIFFICULT AT ALL
SUM OF ALL RESPONSES TO PHQ9 QUESTIONS 1 & 2: 0
1. LITTLE INTEREST OR PLEASURE IN DOING THINGS: NOT AT ALL
8. MOVING OR SPEAKING SO SLOWLY THAT OTHER PEOPLE COULD HAVE NOTICED. OR THE OPPOSITE, BEING SO FIGETY OR RESTLESS THAT YOU HAVE BEEN MOVING AROUND A LOT MORE THAN USUAL: NOT AT ALL
2. FEELING DOWN, DEPRESSED OR HOPELESS: NOT AT ALL

## 2024-05-16 ASSESSMENT — LIFESTYLE VARIABLES
HOW MANY STANDARD DRINKS CONTAINING ALCOHOL DO YOU HAVE ON A TYPICAL DAY: PATIENT DOES NOT DRINK
HOW OFTEN DO YOU HAVE A DRINK CONTAINING ALCOHOL: NEVER

## 2024-05-16 ASSESSMENT — ENCOUNTER SYMPTOMS
ALLERGIC/IMMUNOLOGIC NEGATIVE: 1
WHEEZING: 0
EYES NEGATIVE: 1
SHORTNESS OF BREATH: 1
GASTROINTESTINAL NEGATIVE: 1

## 2024-05-16 NOTE — PATIENT INSTRUCTIONS
upper belly or in one or both shoulders or arms.     Lightheadedness or sudden weakness.     A fast or irregular heartbeat.   After you call 911, the  may tell you to chew 1 adult-strength or 2 to 4 low-dose aspirin. Wait for an ambulance. Do not try to drive yourself.  Watch closely for changes in your health, and be sure to contact your doctor if you have any problems.  Where can you learn more?  Go to https://www.[employer].net/patientEd and enter F075 to learn more about \"A Healthy Heart: Care Instructions.\"  Current as of: June 24, 2023               Content Version: 14.0  © 8869-4605 [employer].   Care instructions adapted under license by [employer]. If you have questions about a medical condition or this instruction, always ask your healthcare professional. [employer] disclaims any warranty or liability for your use of this information.      Personalized Preventive Plan for Keshia Ramirez - 5/16/2024  Medicare offers a range of preventive health benefits. Some of the tests and screenings are paid in full while other may be subject to a deductible, co-insurance, and/or copay.    Some of these benefits include a comprehensive review of your medical history including lifestyle, illnesses that may run in your family, and various assessments and screenings as appropriate.    After reviewing your medical record and screening and assessments performed today your provider may have ordered immunizations, labs, imaging, and/or referrals for you.  A list of these orders (if applicable) as well as your Preventive Care list are included within your After Visit Summary for your review.    Other Preventive Recommendations:    A preventive eye exam performed by an eye specialist is recommended every 1-2 years to screen for glaucoma; cataracts, macular degeneration, and other eye disorders.  A preventive dental visit is recommended every 6 months.  Try to get at least 150 minutes of

## 2024-05-16 NOTE — PROGRESS NOTES
[facility]    Patient: SHANITA GALAN, 63 y.o.   : 1960  2024    Pt of Dr. Goyal     Subjective     Chief Complaint   Patient presents with    Follow-up     COPD 1 month hospital f/u with CXR 3/27/24 and CT lung screen 24.      HPI pulled from Dr. Goyal inpatient note: 3/2024  Shanita Galan is a 63 y.o. female with a past medical history of COPD, HTN, HLD, AA , CVA, CHAITANYA/MDD, and current tobacco use who presented to [facility] ED on 3/27/24 with complaints of shortness of breath. Patient states she also has had a productive clear cough, dyspnea, and chills for the last few days. Her grandchildren have been sick. Patient reports not taking any inhalers for last few years.      In ED, she was saturating at 85% and placed on 2L NC. She does not wear oxygen at baseline. ABG showed pH 7.39, PCO2 44, PO2 64, HCO3 26. COVID-19 and Influenza A/B negative. Chest xray showed no acute cardiopulmonary finding. Patient received Solu-Medrol 125 mg IV once and a breathing treatment in the ED.      Of note, patient previously saw Pulmonology, Dr. Goyal last in .    NEHAL Schmitt is here for follow up for a COPD exacerbation in which she was hospitalized from 3/27/24-3/29/24.  Per hx averaging about 1 exacerbation per year.  Patient needing 6MWT today in the office - sent home on oxygen with activity only 1LPM  Current everyday smoker - has tried Chantix   hSanita has not been seen in our office for quite some time.   Current inhaler use of Anoro daily and Albuterol PRN for SOB/wheezing   A1AT normal  Albuterol use only   Last PFT testing done in 2018 FEV1 52%- moderate obstruction and good response seen with BD in FEV1  LDCT done recently with no acute intrathoracic processes     Progress History:   Since last visit any new medical issues? No  New ER or hospital visits? No  Any new or changes in medicines? No  Using inhalers? Yes but rare use  Are they helpful? Yes

## 2024-07-29 DIAGNOSIS — K21.9 GASTROESOPHAGEAL REFLUX DISEASE WITHOUT ESOPHAGITIS: Chronic | ICD-10-CM

## 2024-07-29 DIAGNOSIS — I69.30 HISTORY OF STROKE WITH RESIDUAL DEFICIT: Chronic | ICD-10-CM

## 2024-07-29 RX ORDER — FAMOTIDINE 20 MG/1
TABLET, FILM COATED ORAL
Qty: 180 TABLET | Refills: 3 | Status: SHIPPED | OUTPATIENT
Start: 2024-07-29

## 2024-07-29 RX ORDER — CLOPIDOGREL BISULFATE 75 MG/1
TABLET ORAL
Qty: 90 TABLET | Refills: 3 | Status: SHIPPED | OUTPATIENT
Start: 2024-07-29

## 2024-07-29 NOTE — TELEPHONE ENCOUNTER
Recent Visits  Date Type Provider Dept   05/16/24 Office Visit Amador Miles, DO [facility]   04/01/24 Office Visit Amador Miles, DO [facility]   08/30/23 Office Visit Khadar Moyer APRN - CNP [facility]   04/05/23 Office Visit Amador Miles, DO [facility]   02/27/23 Office Visit Amador Miles, DO [facility]   Showing recent visits within past 540 days with a meds authorizing provider and meeting all other requirements  Future Appointments  Date Type Provider Dept   11/14/24 Appointment Amador Miles, DO [facility]   Showing future appointments within next 150 days with a meds authorizing provider and meeting all other requirements

## 2024-09-03 ENCOUNTER — OFFICE VISIT (OUTPATIENT)
Dept: FAMILY MEDICINE CLINIC | Age: 64
End: 2024-09-03
Payer: MEDICARE

## 2024-09-03 VITALS
OXYGEN SATURATION: 99 % | HEART RATE: 81 BPM | BODY MASS INDEX: 24.93 KG/M2 | RESPIRATION RATE: 18 BRPM | DIASTOLIC BLOOD PRESSURE: 70 MMHG | SYSTOLIC BLOOD PRESSURE: 132 MMHG | TEMPERATURE: 97.8 F | WEIGHT: 149.6 LBS | HEIGHT: 65 IN

## 2024-09-03 DIAGNOSIS — F17.210 CIGARETTE NICOTINE DEPENDENCE WITHOUT COMPLICATION: ICD-10-CM

## 2024-09-03 DIAGNOSIS — I69.30 HISTORY OF STROKE WITH RESIDUAL DEFICIT: Chronic | ICD-10-CM

## 2024-09-03 DIAGNOSIS — R51.9 ACUTE INTRACTABLE HEADACHE, UNSPECIFIED HEADACHE TYPE: Primary | ICD-10-CM

## 2024-09-03 PROBLEM — J96.01 ACUTE RESPIRATORY FAILURE WITH HYPOXIA (HCC): Status: RESOLVED | Noted: 2024-03-27 | Resolved: 2024-09-03

## 2024-09-03 PROCEDURE — G8427 DOCREV CUR MEDS BY ELIG CLIN: HCPCS | Performed by: FAMILY MEDICINE

## 2024-09-03 PROCEDURE — 3078F DIAST BP <80 MM HG: CPT | Performed by: FAMILY MEDICINE

## 2024-09-03 PROCEDURE — 1123F ACP DISCUSS/DSCN MKR DOCD: CPT | Performed by: FAMILY MEDICINE

## 2024-09-03 PROCEDURE — 1090F PRES/ABSN URINE INCON ASSESS: CPT | Performed by: FAMILY MEDICINE

## 2024-09-03 PROCEDURE — 4004F PT TOBACCO SCREEN RCVD TLK: CPT | Performed by: FAMILY MEDICINE

## 2024-09-03 PROCEDURE — 3017F COLORECTAL CA SCREEN DOC REV: CPT | Performed by: FAMILY MEDICINE

## 2024-09-03 PROCEDURE — G8399 PT W/DXA RESULTS DOCUMENT: HCPCS | Performed by: FAMILY MEDICINE

## 2024-09-03 PROCEDURE — G8420 CALC BMI NORM PARAMETERS: HCPCS | Performed by: FAMILY MEDICINE

## 2024-09-03 PROCEDURE — 3075F SYST BP GE 130 - 139MM HG: CPT | Performed by: FAMILY MEDICINE

## 2024-09-03 PROCEDURE — 99214 OFFICE O/P EST MOD 30 MIN: CPT | Performed by: FAMILY MEDICINE

## 2024-09-03 NOTE — PROGRESS NOTES
Chief Complaint   Patient presents with    Headache     For 2 days      History obtained from the patient.    SUBJECTIVE:  Keshia Ramirez is a 63 y.o. female that presents today for     -Headache:    HPI:  Started yesterday  All of a sudden  Top of head  10/10  Tylenol helped a little  No neuro deficits, fever or neck pain or temple pain  Down to 6/10 today, but persists  States when had her CVA prior, initially presented this way    Location - top of head  Inciting event? - No  Severity - 6/10  History of migraines? -  No  Treatment tried and response - tylenol, no real help    Fever over 100.5- No  Neck stiffness - No  Weakness of arm/leg - No  Nausea/vomiting - No  \"Worst headache ever\" - it was yesterday  Confusion - No        -Hx of CVA:  On plavix and statin  No recurrent sxs  Followed with neuro  Told can now f/u prn      -Smoking:  Smoking 1 pack per wk  Not ready to quit  Has chantix at home      Age/Gender Health Maintenance    Lipid -   Lab Results   Component Value Date    CHOL 126 05/08/2024    CHOL 121 02/21/2023    CHOL 149 06/06/2022     Lab Results   Component Value Date    TRIG 94 05/08/2024    TRIG 109 02/21/2023    TRIG 143 06/06/2022     Lab Results   Component Value Date    HDL 61 05/08/2024    HDL 41 02/21/2023    HDL 46 06/06/2022     Lab Results   Component Value Date    LDL 46 05/08/2024    LDL 58 02/21/2023    LDL 74 06/06/2022       DM Screen -   Lab Results   Component Value Date/Time    GLUCOSE 86 05/08/2024 09:55 AM    GLUCOSE 143 10/15/2019 02:00 PM     Lab Results   Component Value Date/Time    LABA1C 5.6 05/08/2024 09:55 AM    LABA1C 6.1 02/21/2023 09:06 AM    LABA1C 5.8 06/06/2022 09:53 AM    LABA1C 5.8 12/06/2021 09:17 AM    LABA1C 5.6 06/15/2021 09:40 AM    LABA1C 6.1 11/05/2020 08:17 AM       Colon Cancer Screening - + sessile T/a FEB 2019, repeat 2 years per GI, Catia/+ T/A July 2022, repeat 5 years per GI, Catia  Lung Cancer Screening - NEG MAY 2024    Tetanus - to get at

## 2024-09-04 ENCOUNTER — TELEPHONE (OUTPATIENT)
Dept: FAMILY MEDICINE CLINIC | Age: 64
End: 2024-09-04

## 2024-09-11 ENCOUNTER — OFFICE VISIT (OUTPATIENT)
Dept: FAMILY MEDICINE CLINIC | Age: 64
End: 2024-09-11
Payer: MEDICARE

## 2024-09-11 VITALS
DIASTOLIC BLOOD PRESSURE: 74 MMHG | BODY MASS INDEX: 24.49 KG/M2 | RESPIRATION RATE: 18 BRPM | TEMPERATURE: 97.1 F | SYSTOLIC BLOOD PRESSURE: 132 MMHG | OXYGEN SATURATION: 94 % | HEIGHT: 65 IN | WEIGHT: 147 LBS | HEART RATE: 82 BPM

## 2024-09-11 DIAGNOSIS — F17.210 CIGARETTE NICOTINE DEPENDENCE WITHOUT COMPLICATION: ICD-10-CM

## 2024-09-11 DIAGNOSIS — G89.29 CHRONIC EPIGASTRIC PAIN: ICD-10-CM

## 2024-09-11 DIAGNOSIS — R51.9 ACUTE NONINTRACTABLE HEADACHE, UNSPECIFIED HEADACHE TYPE: Primary | ICD-10-CM

## 2024-09-11 DIAGNOSIS — E55.9 VITAMIN D DEFICIENCY: ICD-10-CM

## 2024-09-11 DIAGNOSIS — K21.9 GASTROESOPHAGEAL REFLUX DISEASE WITHOUT ESOPHAGITIS: Chronic | ICD-10-CM

## 2024-09-11 DIAGNOSIS — G47.00 INSOMNIA, UNSPECIFIED TYPE: ICD-10-CM

## 2024-09-11 DIAGNOSIS — R53.82 CHRONIC FATIGUE: ICD-10-CM

## 2024-09-11 DIAGNOSIS — R10.13 CHRONIC EPIGASTRIC PAIN: ICD-10-CM

## 2024-09-11 DIAGNOSIS — F33.42 RECURRENT MAJOR DEPRESSIVE DISORDER, IN FULL REMISSION (HCC): ICD-10-CM

## 2024-09-11 PROCEDURE — 99214 OFFICE O/P EST MOD 30 MIN: CPT | Performed by: FAMILY MEDICINE

## 2024-09-11 PROCEDURE — G8427 DOCREV CUR MEDS BY ELIG CLIN: HCPCS | Performed by: FAMILY MEDICINE

## 2024-09-11 PROCEDURE — 1090F PRES/ABSN URINE INCON ASSESS: CPT | Performed by: FAMILY MEDICINE

## 2024-09-11 PROCEDURE — G8399 PT W/DXA RESULTS DOCUMENT: HCPCS | Performed by: FAMILY MEDICINE

## 2024-09-11 PROCEDURE — G8420 CALC BMI NORM PARAMETERS: HCPCS | Performed by: FAMILY MEDICINE

## 2024-09-11 PROCEDURE — 4004F PT TOBACCO SCREEN RCVD TLK: CPT | Performed by: FAMILY MEDICINE

## 2024-09-11 PROCEDURE — 3075F SYST BP GE 130 - 139MM HG: CPT | Performed by: FAMILY MEDICINE

## 2024-09-11 PROCEDURE — 3017F COLORECTAL CA SCREEN DOC REV: CPT | Performed by: FAMILY MEDICINE

## 2024-09-11 PROCEDURE — 1123F ACP DISCUSS/DSCN MKR DOCD: CPT | Performed by: FAMILY MEDICINE

## 2024-09-11 PROCEDURE — 3078F DIAST BP <80 MM HG: CPT | Performed by: FAMILY MEDICINE

## 2024-09-11 RX ORDER — PANTOPRAZOLE SODIUM 40 MG/1
40 TABLET, DELAYED RELEASE ORAL
Qty: 90 TABLET | Refills: 3 | Status: SHIPPED | OUTPATIENT
Start: 2024-09-11

## 2024-09-11 RX ORDER — BUTALBITAL, ACETAMINOPHEN AND CAFFEINE 50; 325; 40 MG/1; MG/1; MG/1
1 TABLET ORAL EVERY 6 HOURS PRN
Qty: 28 TABLET | Refills: 0 | Status: SHIPPED | OUTPATIENT
Start: 2024-09-11 | End: 2024-09-18

## 2024-10-11 ENCOUNTER — HOSPITAL ENCOUNTER (OUTPATIENT)
Age: 64
Discharge: HOME | End: 2024-10-11
Payer: MEDICARE

## 2024-10-11 DIAGNOSIS — E55.9 VITAMIN D DEFICIENCY: ICD-10-CM

## 2024-10-11 DIAGNOSIS — R53.82 CHRONIC FATIGUE: ICD-10-CM

## 2024-10-11 LAB
25(OH)D3 SERPL-MCNC: 9 NG/ML (ref 30–100)
ALBUMIN SERPL BCG-MCNC: 3.9 G/DL (ref 3.5–5.1)
ALP SERPL-CCNC: 120 U/L (ref 38–126)
ALT SERPL W/O P-5'-P-CCNC: 9 U/L (ref 11–66)
ANION GAP SERPL CALC-SCNC: 9 MEQ/L (ref 8–16)
AST SERPL-CCNC: 11 U/L (ref 5–40)
BASOPHILS ABSOLUTE: 0 THOU/MM3 (ref 0–0.1)
BASOPHILS NFR BLD AUTO: 0.4 %
BILIRUB SERPL-MCNC: 0.4 MG/DL (ref 0.3–1.2)
BUN SERPL-MCNC: 9 MG/DL (ref 7–22)
CALCIUM SERPL-MCNC: 9.1 MG/DL (ref 8.5–10.5)
CHLORIDE SERPL-SCNC: 106 MEQ/L (ref 98–111)
CO2 SERPL-SCNC: 28 MEQ/L (ref 23–33)
CREAT SERPL-MCNC: 0.9 MG/DL (ref 0.4–1.2)
DEPRECATED RDW RBC AUTO: 48.3 FL (ref 35–45)
EOSINOPHIL NFR BLD AUTO: 0.9 %
EOSINOPHILS ABSOLUTE: 0.1 THOU/MM3 (ref 0–0.4)
ERYTHROCYTE [DISTWIDTH] IN BLOOD BY AUTOMATED COUNT: 14.3 % (ref 11.5–14.5)
GFR SERPL CREATININE-BSD FRML MDRD: 71 ML/MIN/1.73M2
GLUCOSE SERPL-MCNC: 90 MG/DL (ref 70–108)
HCT VFR BLD AUTO: 47.7 % (ref 37–47)
HGB BLD-MCNC: 15.6 GM/DL (ref 12–16)
IMM GRANULOCYTES # BLD AUTO: 0.02 THOU/MM3 (ref 0–0.07)
IMM GRANULOCYTES NFR BLD AUTO: 0.3 %
LYMPHOCYTES ABSOLUTE: 2.3 THOU/MM3 (ref 1–4.8)
LYMPHOCYTES NFR BLD AUTO: 33.1 %
MCH RBC QN AUTO: 30.2 PG (ref 26–33)
MCHC RBC AUTO-ENTMCNC: 32.7 GM/DL (ref 32.2–35.5)
MCV RBC AUTO: 92.4 FL (ref 81–99)
MONOCYTES ABSOLUTE: 0.5 THOU/MM3 (ref 0.4–1.3)
MONOCYTES NFR BLD AUTO: 7.5 %
NEUTROPHILS ABSOLUTE: 3.9 THOU/MM3 (ref 1.8–7.7)
NEUTROPHILS NFR BLD AUTO: 57.8 %
NRBC BLD AUTO-RTO: 0 /100 WBC
PLATELET # BLD AUTO: 192 THOU/MM3 (ref 130–400)
PMV BLD AUTO: 10.8 FL (ref 9.4–12.4)
POTASSIUM SERPL-SCNC: 4.2 MEQ/L (ref 3.5–5.2)
PROT SERPL-MCNC: 6.3 G/DL (ref 6.1–8)
RBC # BLD AUTO: 5.16 MILL/MM3 (ref 4.2–5.4)
SODIUM SERPL-SCNC: 143 MEQ/L (ref 135–145)
TSH SERPL DL<=0.005 MIU/L-ACNC: 2.76 UIU/ML (ref 0.4–4.2)
VIT B12 SERPL-MCNC: 261 PG/ML (ref 211–911)
WBC # BLD AUTO: 6.8 THOU/MM3 (ref 4.8–10.8)

## 2024-10-11 PROCEDURE — 84443 ASSAY THYROID STIM HORMONE: CPT

## 2024-10-11 PROCEDURE — 80053 COMPREHEN METABOLIC PANEL: CPT

## 2024-10-11 PROCEDURE — 84207 ASSAY OF VITAMIN B-6: CPT

## 2024-10-11 PROCEDURE — 82306 VITAMIN D 25 HYDROXY: CPT

## 2024-10-11 PROCEDURE — 84425 ASSAY OF VITAMIN B-1: CPT

## 2024-10-11 PROCEDURE — 36415 COLL VENOUS BLD VENIPUNCTURE: CPT

## 2024-10-11 PROCEDURE — 82607 VITAMIN B-12: CPT

## 2024-10-11 PROCEDURE — 85025 COMPLETE CBC W/AUTO DIFF WBC: CPT

## 2024-10-14 LAB — PYRIDOXAL PHOS SERPL-SCNC: 11.5 NMOL/L (ref 20–125)

## 2024-10-15 PROBLEM — M85.80 OSTEOPENIA: Chronic | Status: ACTIVE | Noted: 2024-05-10

## 2024-10-15 LAB — VIT B1 PYROPHOSHATE BLD-SCNC: 138 NMOL/L (ref 70–180)

## 2024-10-16 ENCOUNTER — OFFICE VISIT (OUTPATIENT)
Dept: FAMILY MEDICINE CLINIC | Age: 64
End: 2024-10-16
Payer: MEDICARE

## 2024-10-16 VITALS
BODY MASS INDEX: 24.16 KG/M2 | SYSTOLIC BLOOD PRESSURE: 130 MMHG | HEART RATE: 79 BPM | WEIGHT: 145 LBS | HEIGHT: 65 IN | DIASTOLIC BLOOD PRESSURE: 76 MMHG | TEMPERATURE: 97.7 F | OXYGEN SATURATION: 97 % | RESPIRATION RATE: 16 BRPM

## 2024-10-16 DIAGNOSIS — E53.1 VITAMIN B6 DEFICIENCY: ICD-10-CM

## 2024-10-16 DIAGNOSIS — E55.9 VITAMIN D DEFICIENCY: ICD-10-CM

## 2024-10-16 DIAGNOSIS — I71.43 INFRARENAL ABDOMINAL AORTIC ANEURYSM (AAA) WITHOUT RUPTURE (HCC): ICD-10-CM

## 2024-10-16 DIAGNOSIS — R73.03 PREDIABETES: Chronic | ICD-10-CM

## 2024-10-16 DIAGNOSIS — R11.0 CHRONIC NAUSEA: ICD-10-CM

## 2024-10-16 DIAGNOSIS — J44.9 CHRONIC OBSTRUCTIVE PULMONARY DISEASE, UNSPECIFIED COPD TYPE (HCC): ICD-10-CM

## 2024-10-16 DIAGNOSIS — F33.42 RECURRENT MAJOR DEPRESSIVE DISORDER, IN FULL REMISSION (HCC): ICD-10-CM

## 2024-10-16 DIAGNOSIS — F17.210 CIGARETTE NICOTINE DEPENDENCE WITHOUT COMPLICATION: ICD-10-CM

## 2024-10-16 DIAGNOSIS — R53.82 CHRONIC FATIGUE: ICD-10-CM

## 2024-10-16 DIAGNOSIS — R73.9 HYPERGLYCEMIA: ICD-10-CM

## 2024-10-16 DIAGNOSIS — G47.00 INSOMNIA, UNSPECIFIED TYPE: ICD-10-CM

## 2024-10-16 DIAGNOSIS — M85.80 OSTEOPENIA, UNSPECIFIED LOCATION: ICD-10-CM

## 2024-10-16 DIAGNOSIS — Z23 NEED FOR INFLUENZA VACCINATION: ICD-10-CM

## 2024-10-16 DIAGNOSIS — E53.8 B12 DEFICIENCY: ICD-10-CM

## 2024-10-16 DIAGNOSIS — K21.9 GASTROESOPHAGEAL REFLUX DISEASE WITHOUT ESOPHAGITIS: ICD-10-CM

## 2024-10-16 DIAGNOSIS — R10.13 CHRONIC EPIGASTRIC PAIN: ICD-10-CM

## 2024-10-16 DIAGNOSIS — R51.9 ACUTE NONINTRACTABLE HEADACHE, UNSPECIFIED HEADACHE TYPE: Primary | ICD-10-CM

## 2024-10-16 DIAGNOSIS — K86.1 CHRONIC PANCREATITIS, UNSPECIFIED PANCREATITIS TYPE (HCC): ICD-10-CM

## 2024-10-16 DIAGNOSIS — I10 ESSENTIAL HYPERTENSION: Chronic | ICD-10-CM

## 2024-10-16 DIAGNOSIS — I69.30 HISTORY OF STROKE WITH RESIDUAL DEFICIT: Chronic | ICD-10-CM

## 2024-10-16 DIAGNOSIS — E78.5 DYSLIPIDEMIA: Chronic | ICD-10-CM

## 2024-10-16 DIAGNOSIS — G89.29 CHRONIC EPIGASTRIC PAIN: ICD-10-CM

## 2024-10-16 PROCEDURE — G8420 CALC BMI NORM PARAMETERS: HCPCS | Performed by: FAMILY MEDICINE

## 2024-10-16 PROCEDURE — 3017F COLORECTAL CA SCREEN DOC REV: CPT | Performed by: FAMILY MEDICINE

## 2024-10-16 PROCEDURE — 3078F DIAST BP <80 MM HG: CPT | Performed by: FAMILY MEDICINE

## 2024-10-16 PROCEDURE — 3075F SYST BP GE 130 - 139MM HG: CPT | Performed by: FAMILY MEDICINE

## 2024-10-16 PROCEDURE — G0008 ADMIN INFLUENZA VIRUS VAC: HCPCS | Performed by: FAMILY MEDICINE

## 2024-10-16 PROCEDURE — 99214 OFFICE O/P EST MOD 30 MIN: CPT | Performed by: FAMILY MEDICINE

## 2024-10-16 PROCEDURE — 1123F ACP DISCUSS/DSCN MKR DOCD: CPT | Performed by: FAMILY MEDICINE

## 2024-10-16 PROCEDURE — G8482 FLU IMMUNIZE ORDER/ADMIN: HCPCS | Performed by: FAMILY MEDICINE

## 2024-10-16 PROCEDURE — G8399 PT W/DXA RESULTS DOCUMENT: HCPCS | Performed by: FAMILY MEDICINE

## 2024-10-16 PROCEDURE — 1090F PRES/ABSN URINE INCON ASSESS: CPT | Performed by: FAMILY MEDICINE

## 2024-10-16 PROCEDURE — 90653 IIV ADJUVANT VACCINE IM: CPT | Performed by: FAMILY MEDICINE

## 2024-10-16 PROCEDURE — G8427 DOCREV CUR MEDS BY ELIG CLIN: HCPCS | Performed by: FAMILY MEDICINE

## 2024-10-16 PROCEDURE — 4004F PT TOBACCO SCREEN RCVD TLK: CPT | Performed by: FAMILY MEDICINE

## 2024-10-16 PROCEDURE — 3023F SPIROM DOC REV: CPT | Performed by: FAMILY MEDICINE

## 2024-10-16 RX ORDER — CHOLECALCIFEROL (VITAMIN D3) 1250 MCG
1 CAPSULE ORAL
Qty: 8 CAPSULE | Refills: 0 | Status: SHIPPED | OUTPATIENT
Start: 2024-10-16 | End: 2024-12-05

## 2024-10-16 RX ORDER — PERPHENAZINE/AMITRIPTYLINE HCL 2 MG-25 MG
1 TABLET ORAL DAILY
Qty: 90 TABLET | Refills: 0 | Status: SHIPPED | OUTPATIENT
Start: 2024-10-16

## 2024-10-16 RX ORDER — PYRIDOXINE HCL (VITAMIN B6) 25 MG
25 TABLET ORAL DAILY
Qty: 90 TABLET | Refills: 0 | Status: SHIPPED | OUTPATIENT
Start: 2024-10-16

## 2024-10-16 NOTE — PROGRESS NOTES
Vaccine Information Sheet, \"Influenza - Inactivated\"  given to Keshia Ramirez, or parent/legal guardian of  Keshia Ramirez and verbalized understanding.    Patient responses:    Have you ever had a reaction to a flu vaccine? No  Do you have an allergy to eggs, neomycin or polymixin?  No  Do you have an allergy to Thimerosal, contact lens solution, or Merthiolate? No  Have you ever had Guillian Sproul Syndrome?  No  Do you have any current illness?  No  Do you have a temperature above 100 degrees? No  Are you pregnant? No  If pregnant, permission obtained from physician? No  Do you have an active neurological disorder? No      Flu vaccine given per order. Please see immunization tab.    Immunization(s) given during visit:    Immunizations Administered       Name Date Dose Route    Influenza, FLUAD, (age 65 y+), IM, Trivalent PF, 0.5mL 10/16/2024 0.5 mL Intramuscular    Site: Deltoid- Left    Lot: [id number]    NDC: 21791-734-61            Most recent Vaccine Information Sheet dated 8.6.2021 given to pt  
1-800-QUIT NOW. No barriers other than lack of desire. 3+ min spent counseling.     21. Need for influenza vaccination    - Influenza, FLUAD Trivalent, (age 65 y+), IM, Preservative Free, 0.5mL      DISPOSITION    Return in about 3 months (around 1/7/2025) for f/u multiple issues, sooner as needed.    Keshia released with restrictions.    Future Appointments   Date Time Provider Department Center   11/14/2024 10:20 AM Amador Miles,  [facility] DEP   1/16/2025 10:00 AM Amador Miles,  [facility] DEP   5/9/2025  9:00 AM STR PULMONARY FUNCTION ROOM 1 STRZ PFT [facility]   5/9/2025 10:10 AM STR CT IMAGING RM1  [facility] STRZ OUT EXP STR Rad/Card   5/15/2025  1:30 PM Palmira Fraga, APRN - CNP [facility] - [place]     PATIENT COUNSELING    Counseling was provided today regarding the following topics: Healthy eating habits, Regular exercise, substance abuse and healthy sleep habits.     Barriers to learning and self management: none    Discussed use, benefit, and side effects of prescribed medications.  Barriers to medication compliance addressed.  All patient questions answered.  Pt voiced understanding.       Electronically signed by Amador Miles DO on 10/16/2024 at 11:02 AM

## 2024-10-16 NOTE — PATIENT INSTRUCTIONS
LAB INSTRUCTIONS:    Please complete labs IN 9 weeks.     Please fast for 8 hours prior to lab collection.    The clinic will call you within 1 week of collection. If you have not heard from us within that amount of time, please call us at [phone].

## 2024-11-13 DIAGNOSIS — J44.9 CHRONIC OBSTRUCTIVE PULMONARY DISEASE, UNSPECIFIED COPD TYPE (HCC): Chronic | ICD-10-CM

## 2024-11-24 DIAGNOSIS — E78.5 DYSLIPIDEMIA: ICD-10-CM

## 2024-11-25 RX ORDER — ATORVASTATIN CALCIUM 80 MG/1
80 TABLET, FILM COATED ORAL NIGHTLY
Qty: 90 TABLET | Refills: 3 | Status: SHIPPED | OUTPATIENT
Start: 2024-11-25

## 2024-11-25 NOTE — TELEPHONE ENCOUNTER
Recent Visits  Date Type Provider Dept   10/16/24 Office Visit Amador Miles, DO [facility]   09/11/24 Office Visit Amador Miles, DO [facility]   09/03/24 Office Visit Amador Miles, DO [facility]   05/16/24 Office Visit Amador Miles, DO [facility]   04/01/24 Office Visit Amador Miles, DO [facility]   08/30/23 Office Visit Khadar Moyer, MADAI - CNP [facility]   Showing recent visits within past 540 days with a meds authorizing provider and meeting all other requirements  Future Appointments  Date Type Provider Dept   01/16/25 Appointment Amador Miles, DO [facility]   Showing future appointments within next 150 days with a meds authorizing provider and meeting all other requirements

## 2024-12-10 RX ORDER — UMECLIDINIUM BROMIDE AND VILANTEROL TRIFENATATE 62.5; 25 UG/1; UG/1
POWDER RESPIRATORY (INHALATION)
Qty: 60 EACH | OUTPATIENT
Start: 2024-12-10

## 2024-12-17 ENCOUNTER — APPOINTMENT (OUTPATIENT)
Dept: CT IMAGING | Age: 64
End: 2024-12-17
Payer: MEDICARE

## 2024-12-17 ENCOUNTER — HOSPITAL ENCOUNTER (EMERGENCY)
Age: 64
Discharge: HOME | End: 2024-12-17
Attending: FAMILY MEDICINE
Payer: MEDICARE

## 2024-12-17 VITALS
HEART RATE: 62 BPM | SYSTOLIC BLOOD PRESSURE: 127 MMHG | TEMPERATURE: 97.6 F | WEIGHT: 145 LBS | BODY MASS INDEX: 24.13 KG/M2 | DIASTOLIC BLOOD PRESSURE: 43 MMHG | RESPIRATION RATE: 18 BRPM | OXYGEN SATURATION: 95 %

## 2024-12-17 DIAGNOSIS — K56.41 FECAL IMPACTION IN RECTUM (HCC): Primary | ICD-10-CM

## 2024-12-17 DIAGNOSIS — R11.2 NAUSEA AND VOMITING, UNSPECIFIED VOMITING TYPE: ICD-10-CM

## 2024-12-17 LAB
ALBUMIN SERPL BCG-MCNC: 4.3 G/DL (ref 3.5–5.1)
ALP SERPL-CCNC: 109 U/L (ref 38–126)
ALT SERPL W/O P-5'-P-CCNC: 12 U/L (ref 11–66)
ANION GAP SERPL CALC-SCNC: 17 MEQ/L (ref 8–16)
AST SERPL-CCNC: 20 U/L (ref 5–40)
BASOPHILS ABSOLUTE: 0 THOU/MM3 (ref 0–0.1)
BASOPHILS NFR BLD AUTO: 0.3 %
BILIRUB CONJ SERPL-MCNC: 0.2 MG/DL (ref 0.1–13.8)
BILIRUB SERPL-MCNC: 0.7 MG/DL (ref 0.3–1.2)
BUN SERPL-MCNC: 12 MG/DL (ref 7–22)
CALCIUM SERPL-MCNC: 9.8 MG/DL (ref 8.5–10.5)
CHLORIDE SERPL-SCNC: 105 MEQ/L (ref 98–111)
CO2 SERPL-SCNC: 22 MEQ/L (ref 23–33)
CREAT SERPL-MCNC: 0.7 MG/DL (ref 0.4–1.2)
DEPRECATED RDW RBC AUTO: 45.3 FL (ref 35–45)
EKG ATRIAL RATE: 50 BPM
EKG P AXIS: 71 DEGREES
EKG P-R INTERVAL: 150 MS
EKG Q-T INTERVAL: 534 MS
EKG QRS DURATION: 130 MS
EKG QTC CALCULATION (BAZETT): 486 MS
EKG R AXIS: 11 DEGREES
EKG T AXIS: 92 DEGREES
EKG VENTRICULAR RATE: 50 BPM
EOSINOPHIL NFR BLD AUTO: 0.3 %
EOSINOPHILS ABSOLUTE: 0 THOU/MM3 (ref 0–0.4)
ERYTHROCYTE [DISTWIDTH] IN BLOOD BY AUTOMATED COUNT: 13.6 % (ref 11.5–14.5)
FLUAV RNA RESP QL NAA+PROBE: NOT DETECTED
FLUBV RNA RESP QL NAA+PROBE: NOT DETECTED
GFR SERPL CREATININE-BSD FRML MDRD: > 90 ML/MIN/1.73M2
GLUCOSE BLD STRIP.AUTO-MCNC: 149 MG/DL (ref 70–108)
GLUCOSE SERPL-MCNC: 152 MG/DL (ref 70–108)
HCT VFR BLD AUTO: 47.1 % (ref 37–47)
HGB BLD-MCNC: 16.2 GM/DL (ref 12–16)
IMM GRANULOCYTES # BLD AUTO: 0.03 THOU/MM3 (ref 0–0.07)
IMM GRANULOCYTES NFR BLD AUTO: 0.3 %
INR PPP: 1.01 (ref 0.85–1.13)
LACTIC ACID, SEPSIS: 2.9 MMOL/L (ref 0.5–1.9)
LACTIC ACID, SEPSIS: 3.3 MMOL/L (ref 0.5–1.9)
LYMPHOCYTES ABSOLUTE: 2.3 THOU/MM3 (ref 1–4.8)
LYMPHOCYTES NFR BLD AUTO: 20.7 %
MCH RBC QN AUTO: 31.2 PG (ref 26–33)
MCHC RBC AUTO-ENTMCNC: 34.4 GM/DL (ref 32.2–35.5)
MCV RBC AUTO: 90.6 FL (ref 81–99)
MONOCYTES ABSOLUTE: 0.6 THOU/MM3 (ref 0.4–1.3)
MONOCYTES NFR BLD AUTO: 5 %
NEUTROPHILS ABSOLUTE: 8.2 THOU/MM3 (ref 1.8–7.7)
NEUTROPHILS NFR BLD AUTO: 73.4 %
NRBC BLD AUTO-RTO: 0 /100 WBC
OSMOLALITY SERPL CALC.SUM OF ELEC: 289.6 MOSMOL/KG (ref 275–300)
PLATELET # BLD AUTO: 253 THOU/MM3 (ref 130–400)
PMV BLD AUTO: 11.1 FL (ref 9.4–12.4)
POTASSIUM SERPL-SCNC: 3.8 MEQ/L (ref 3.5–5.2)
PROT SERPL-MCNC: 6.9 G/DL (ref 6.1–8)
RBC # BLD AUTO: 5.2 MILL/MM3 (ref 4.2–5.4)
SARS-COV-2 RNA RESP QL NAA+PROBE: NOT DETECTED
SODIUM SERPL-SCNC: 144 MEQ/L (ref 135–145)
TROPONIN, HIGH SENSITIVITY: 9 NG/L (ref 0–12)
WBC # BLD AUTO: 11.2 THOU/MM3 (ref 4.8–10.8)

## 2024-12-17 PROCEDURE — 85610 PROTHROMBIN TIME: CPT

## 2024-12-17 PROCEDURE — 6370000000 HC RX 637 (ALT 250 FOR IP): Performed by: EMERGENCY MEDICINE

## 2024-12-17 PROCEDURE — 82248 BILIRUBIN DIRECT: CPT

## 2024-12-17 PROCEDURE — 85025 COMPLETE CBC W/AUTO DIFF WBC: CPT

## 2024-12-17 PROCEDURE — 6360000002 HC RX W HCPCS: Performed by: EMERGENCY MEDICINE

## 2024-12-17 PROCEDURE — 71275 CT ANGIOGRAPHY CHEST: CPT

## 2024-12-17 PROCEDURE — 93005 ELECTROCARDIOGRAM TRACING: CPT | Performed by: EMERGENCY MEDICINE

## 2024-12-17 PROCEDURE — 2580000003 HC RX 258: Performed by: EMERGENCY MEDICINE

## 2024-12-17 PROCEDURE — 93010 ELECTROCARDIOGRAM REPORT: CPT | Performed by: NUCLEAR MEDICINE

## 2024-12-17 PROCEDURE — 83605 ASSAY OF LACTIC ACID: CPT

## 2024-12-17 PROCEDURE — 96361 HYDRATE IV INFUSION ADD-ON: CPT

## 2024-12-17 PROCEDURE — 74175 CTA ABDOMEN W/CONTRAST: CPT

## 2024-12-17 PROCEDURE — 80053 COMPREHEN METABOLIC PANEL: CPT

## 2024-12-17 PROCEDURE — 96375 TX/PRO/DX INJ NEW DRUG ADDON: CPT

## 2024-12-17 PROCEDURE — 82948 REAGENT STRIP/BLOOD GLUCOSE: CPT

## 2024-12-17 PROCEDURE — 6360000004 HC RX CONTRAST MEDICATION: Performed by: FAMILY MEDICINE

## 2024-12-17 PROCEDURE — 96374 THER/PROPH/DIAG INJ IV PUSH: CPT

## 2024-12-17 PROCEDURE — 99285 EMERGENCY DEPT VISIT HI MDM: CPT

## 2024-12-17 PROCEDURE — 87636 SARSCOV2 & INF A&B AMP PRB: CPT

## 2024-12-17 PROCEDURE — 36415 COLL VENOUS BLD VENIPUNCTURE: CPT

## 2024-12-17 PROCEDURE — 96376 TX/PRO/DX INJ SAME DRUG ADON: CPT

## 2024-12-17 PROCEDURE — 84484 ASSAY OF TROPONIN QUANT: CPT

## 2024-12-17 RX ORDER — ONDANSETRON 4 MG/1
4 TABLET, ORALLY DISINTEGRATING ORAL 3 TIMES DAILY PRN
Qty: 21 TABLET | Refills: 0 | Status: SHIPPED | OUTPATIENT
Start: 2024-12-17

## 2024-12-17 RX ORDER — IOPAMIDOL 755 MG/ML
80 INJECTION, SOLUTION INTRAVASCULAR
Status: COMPLETED | OUTPATIENT
Start: 2024-12-17 | End: 2024-12-17

## 2024-12-17 RX ORDER — DICYCLOMINE HYDROCHLORIDE 10 MG/1
10 CAPSULE ORAL ONCE
Status: COMPLETED | OUTPATIENT
Start: 2024-12-17 | End: 2024-12-17

## 2024-12-17 RX ORDER — POLYETHYLENE GLYCOL 3350 17 G/17G
17 POWDER, FOR SOLUTION ORAL DAILY PRN
Qty: 30 PACKET | Refills: 0 | Status: SHIPPED | OUTPATIENT
Start: 2024-12-17 | End: 2025-01-16

## 2024-12-17 RX ORDER — ONDANSETRON 2 MG/ML
4 INJECTION INTRAMUSCULAR; INTRAVENOUS ONCE
Status: COMPLETED | OUTPATIENT
Start: 2024-12-17 | End: 2024-12-17

## 2024-12-17 RX ORDER — 0.9 % SODIUM CHLORIDE 0.9 %
1000 INTRAVENOUS SOLUTION INTRAVENOUS ONCE
Status: COMPLETED | OUTPATIENT
Start: 2024-12-17 | End: 2024-12-17

## 2024-12-17 RX ORDER — MORPHINE SULFATE 4 MG/ML
4 INJECTION, SOLUTION INTRAMUSCULAR; INTRAVENOUS ONCE
Status: COMPLETED | OUTPATIENT
Start: 2024-12-17 | End: 2024-12-17

## 2024-12-17 RX ADMIN — LIDOCAINE HYDROCHLORIDE: 20 SOLUTION ORAL at 17:00

## 2024-12-17 RX ADMIN — ONDANSETRON 4 MG: 2 INJECTION INTRAMUSCULAR; INTRAVENOUS at 17:11

## 2024-12-17 RX ADMIN — MORPHINE SULFATE 4 MG: 4 INJECTION, SOLUTION INTRAMUSCULAR; INTRAVENOUS at 14:11

## 2024-12-17 RX ADMIN — DICYCLOMINE HYDROCHLORIDE 10 MG: 10 CAPSULE ORAL at 17:00

## 2024-12-17 RX ADMIN — ONDANSETRON 4 MG: 2 INJECTION INTRAMUSCULAR; INTRAVENOUS at 14:11

## 2024-12-17 RX ADMIN — SODIUM CHLORIDE 1000 ML: 9 INJECTION, SOLUTION INTRAVENOUS at 14:14

## 2024-12-17 RX ADMIN — IOPAMIDOL 80 ML: 755 INJECTION, SOLUTION INTRAVENOUS at 15:32

## 2024-12-17 ASSESSMENT — PAIN - FUNCTIONAL ASSESSMENT
PAIN_FUNCTIONAL_ASSESSMENT: WONG-BAKER FACES
PAIN_FUNCTIONAL_ASSESSMENT: WONG-BAKER FACES

## 2024-12-17 ASSESSMENT — PAIN DESCRIPTION - LOCATION: LOCATION: ABDOMEN

## 2024-12-17 ASSESSMENT — PAIN SCALES - WONG BAKER
WONGBAKER_NUMERICALRESPONSE: HURTS WHOLE LOT
WONGBAKER_NUMERICALRESPONSE: HURTS WHOLE LOT

## 2024-12-17 NOTE — DISCHARGE INSTRUCTIONS
You are seen here today for abdominal pain, nausea, vomiting.  Take Zofran as needed for nausea.  Make sure to take MiraLAX 1 capful a day, increase by 1 capful each day until you have a good bowel movement.  Take Tylenol as needed for pain.  Make sure to drink plenty of fluids and plenty of fiber.  Follow-up with your primary care doctor in the next 2 days.  Return here develop worsening or symptoms or uncontrolled vomiting.

## 2024-12-17 NOTE — ED NOTES
Pt to ED c/o abdominal pain and vomiting since this morning. Pt states she has an extensive GI history but they haven't found out what is wrong. Pt arrived drenched in sweat. Pt afebrile. Pt states the pain got worse today. Pt states she is constipated and has been taking medication to try to have a bowel movement. Pt states her last BM was a couple of days ago. Pt states she smokes marijuana. Pt vomiting during triage. EKG completed. Blood sugar 149. Dr. Styles notified.

## 2024-12-17 NOTE — ED PROVIDER NOTES
[facility] EMERGENCY DEPT      EMERGENCY MEDICINE     Pt Name: Keshia Ramirez  MRN: [id number]  Birthdate 1960  Date of evaluation: 12/17/2024  Provider: Jr Styles DO  Supervising Physician: Wiley Conti MD    CHIEF COMPLAINT       Chief Complaint   Patient presents with    Abdominal Pain    Vomiting     HISTORY OF PRESENT ILLNESS   Keshia Ramirez is a 64 y.o. female with a history of AAA, pancreatitis, COPD, HTN who presents to the emergency department from home for evaluation of abdominal pain and vomiting that started around 10 AM.  The pain is periumbilical, constant, nondraining.  Patient reports her last bowel movement was 1 week ago, reports that she is not passing any stool and that she is about 3-4 episodes of vomiting.  Patient denies any history of abdominal surgeries.  Patient is aware of her AAA and states that she follows with Dr. Renetta Mccrary for this.  Patient denies any chest pain, dyspnea, fever.  She complains of a chronic cough and some congestion.    PASTMEDICAL HISTORY     Past Medical History:   Diagnosis Date    AAA (abdominal aortic aneurysm) ([facility]) 04/18/2014    Follows with Dr. Molina's group    Chronic pancreatitis ([facility])     COPD (chronic obstructive pulmonary disease) ([facility])     Dyslipidemia     Essential hypertension 05/08/2014    History of colon polyps     History of stroke with residual deficit; cryptogenic 05/2017    Follows with neuro and cardio at [facility]    Nicotine dependence     Osteoarthritis     Osteopenia 05/10/2024       Patient Active Problem List   Diagnosis Code    Nicotine dependence F17.200    COPD (chronic obstructive pulmonary disease) ([facility]) J44.9    Obesity (BMI 30-39.9) E66.9    Essential hypertension I10    Chronic pancreatitis ([facility]) K86.1    Dyslipidemia E78.5    AAA (abdominal aortic aneurysm) ([facility]) I71.40    GERD (gastroesophageal reflux disease) K21.9    History of stroke with residual deficit; cryptogenic I69.30    Osteoarthritis M19.90    Chronic

## 2025-01-15 NOTE — PROGRESS NOTES
Chief Complaint   Patient presents with    Medicare AW    Nicotine Dependence    Fatigue    Follow-up     Issues noted below     History obtained from the patient.    SUBJECTIVE:  Keshia Ramirez is a 64 y.o. female that presents today for     -Smoking:  Smoking 1 pack per wk  Ready to quit  Chantix causes headaches  Ok to try Wellbutrin      -Fatigue PRIOR VISIT:  Inc fatigue the last several months  Sleeping well  Wakes refreshed, but feels tired all the time during the day  Makes hard to get things done  Wants to get it done, just can't d/t the fatigue  Denies being depressed. Denies anhedonia.   On lexapro and trazodone for sleep/insomnia.     UPDATE LAST VISIT:   No change in above  No better  No worse.   Had labs drawn, ok other than low B6, B12 and Vit D     UPDATE TODAY:   Here for f/u  On b6 yet  Completed Vit D  Never picked up b12 supplement  Had f/u labs ordered for dEC 2024, but didn't do  Needs new order   Fatigue is some better      -Epigastric pain/GERD:  See's GI, last seen 2023  Is on pepcid  Had been out of protonix for a bit, so we resumed a few months ago  Sxs improving but not resolved.   No dysphagia  Needs to make f/u apt with GI, plans to this wk  Wts stable      -COPD:  On Anoro and prn albuterol  See's pulm  Denies cough, wheezing or SOB      -Depression:  On Lexapro and Trazodone  Moods stable  No SI/HI      -Hx of CVA:  On plavix and statin  No recurrent sxs  Followed with neuro  Told can now f/u prn      -HTN:     HPI:     Taking meds as prescribed ?: yes  Tolerating well ?: yes  Side Effects ?: denies  BP at home ?: <140/90  Working on TLCS ?: yes  Chest Pain/SOB/Palpitations? Denies        -HLD:     HPI:     Taking meds as prescribed ?: yes  Tolerating well ?: denies  Side Effects ?: denies  Muscle Pain?: denies  Working on TLCS ?: yes        -AAA: stable, was following with [facility], but last saw in 2019 and doesn't want to f/u with them. I ordered f/u US and was stable

## 2025-01-16 ENCOUNTER — OFFICE VISIT (OUTPATIENT)
Dept: FAMILY MEDICINE CLINIC | Age: 65
End: 2025-01-16

## 2025-01-16 VITALS
RESPIRATION RATE: 14 BRPM | HEIGHT: 65 IN | BODY MASS INDEX: 23.46 KG/M2 | OXYGEN SATURATION: 91 % | WEIGHT: 140.8 LBS | TEMPERATURE: 97.6 F | HEART RATE: 80 BPM | SYSTOLIC BLOOD PRESSURE: 124 MMHG | DIASTOLIC BLOOD PRESSURE: 60 MMHG

## 2025-01-16 DIAGNOSIS — R10.13 CHRONIC EPIGASTRIC PAIN: ICD-10-CM

## 2025-01-16 DIAGNOSIS — R11.0 CHRONIC NAUSEA: ICD-10-CM

## 2025-01-16 DIAGNOSIS — Z00.00 MEDICARE ANNUAL WELLNESS VISIT, SUBSEQUENT: Primary | ICD-10-CM

## 2025-01-16 DIAGNOSIS — E55.9 VITAMIN D DEFICIENCY: ICD-10-CM

## 2025-01-16 DIAGNOSIS — F17.210 CIGARETTE NICOTINE DEPENDENCE WITHOUT COMPLICATION: ICD-10-CM

## 2025-01-16 DIAGNOSIS — E53.1 VITAMIN B6 DEFICIENCY: ICD-10-CM

## 2025-01-16 DIAGNOSIS — K86.1 CHRONIC PANCREATITIS, UNSPECIFIED PANCREATITIS TYPE (HCC): ICD-10-CM

## 2025-01-16 DIAGNOSIS — K21.9 GASTROESOPHAGEAL REFLUX DISEASE WITHOUT ESOPHAGITIS: ICD-10-CM

## 2025-01-16 DIAGNOSIS — J44.9 CHRONIC OBSTRUCTIVE PULMONARY DISEASE, UNSPECIFIED COPD TYPE (HCC): ICD-10-CM

## 2025-01-16 DIAGNOSIS — E78.5 DYSLIPIDEMIA: Chronic | ICD-10-CM

## 2025-01-16 DIAGNOSIS — G89.29 CHRONIC EPIGASTRIC PAIN: ICD-10-CM

## 2025-01-16 DIAGNOSIS — I10 ESSENTIAL HYPERTENSION: Chronic | ICD-10-CM

## 2025-01-16 DIAGNOSIS — R73.03 PREDIABETES: Chronic | ICD-10-CM

## 2025-01-16 DIAGNOSIS — E53.8 B12 DEFICIENCY: ICD-10-CM

## 2025-01-16 DIAGNOSIS — F33.42 RECURRENT MAJOR DEPRESSIVE DISORDER, IN FULL REMISSION (HCC): ICD-10-CM

## 2025-01-16 DIAGNOSIS — R73.9 HYPERGLYCEMIA: ICD-10-CM

## 2025-01-16 DIAGNOSIS — G47.00 INSOMNIA, UNSPECIFIED TYPE: ICD-10-CM

## 2025-01-16 DIAGNOSIS — I71.43 INFRARENAL ABDOMINAL AORTIC ANEURYSM (AAA) WITHOUT RUPTURE (HCC): ICD-10-CM

## 2025-01-16 DIAGNOSIS — R53.82 CHRONIC FATIGUE: ICD-10-CM

## 2025-01-16 DIAGNOSIS — I69.30 HISTORY OF STROKE WITH RESIDUAL DEFICIT: Chronic | ICD-10-CM

## 2025-01-16 DIAGNOSIS — M85.80 OSTEOPENIA, UNSPECIFIED LOCATION: ICD-10-CM

## 2025-01-16 RX ORDER — BUPROPION HYDROCHLORIDE 150 MG/1
TABLET, EXTENDED RELEASE ORAL
Qty: 177 TABLET | Refills: 0 | Status: SHIPPED | OUTPATIENT
Start: 2025-01-16 | End: 2025-04-16

## 2025-01-16 SDOH — ECONOMIC STABILITY: FOOD INSECURITY: WITHIN THE PAST 12 MONTHS, YOU WORRIED THAT YOUR FOOD WOULD RUN OUT BEFORE YOU GOT MONEY TO BUY MORE.: NEVER TRUE

## 2025-01-16 SDOH — ECONOMIC STABILITY: FOOD INSECURITY: WITHIN THE PAST 12 MONTHS, THE FOOD YOU BOUGHT JUST DIDN'T LAST AND YOU DIDN'T HAVE MONEY TO GET MORE.: NEVER TRUE

## 2025-01-16 ASSESSMENT — PATIENT HEALTH QUESTIONNAIRE - PHQ9
9. THOUGHTS THAT YOU WOULD BE BETTER OFF DEAD, OR OF HURTING YOURSELF: NOT AT ALL
6. FEELING BAD ABOUT YOURSELF - OR THAT YOU ARE A FAILURE OR HAVE LET YOURSELF OR YOUR FAMILY DOWN: NOT AT ALL
SUM OF ALL RESPONSES TO PHQ QUESTIONS 1-9: 4
7. TROUBLE CONCENTRATING ON THINGS, SUCH AS READING THE NEWSPAPER OR WATCHING TELEVISION: NOT AT ALL
4. FEELING TIRED OR HAVING LITTLE ENERGY: SEVERAL DAYS
3. TROUBLE FALLING OR STAYING ASLEEP: NOT AT ALL
10. IF YOU CHECKED OFF ANY PROBLEMS, HOW DIFFICULT HAVE THESE PROBLEMS MADE IT FOR YOU TO DO YOUR WORK, TAKE CARE OF THINGS AT HOME, OR GET ALONG WITH OTHER PEOPLE: NOT DIFFICULT AT ALL
SUM OF ALL RESPONSES TO PHQ QUESTIONS 1-9: 4
SUM OF ALL RESPONSES TO PHQ9 QUESTIONS 1 & 2: 0
1. LITTLE INTEREST OR PLEASURE IN DOING THINGS: NOT AT ALL
SUM OF ALL RESPONSES TO PHQ QUESTIONS 1-9: 4
5. POOR APPETITE OR OVEREATING: NEARLY EVERY DAY
8. MOVING OR SPEAKING SO SLOWLY THAT OTHER PEOPLE COULD HAVE NOTICED. OR THE OPPOSITE, BEING SO FIGETY OR RESTLESS THAT YOU HAVE BEEN MOVING AROUND A LOT MORE THAN USUAL: NOT AT ALL
SUM OF ALL RESPONSES TO PHQ QUESTIONS 1-9: 4
2. FEELING DOWN, DEPRESSED OR HOPELESS: NOT AT ALL

## 2025-01-16 NOTE — PATIENT INSTRUCTIONS

## 2025-01-17 DIAGNOSIS — J44.9 CHRONIC OBSTRUCTIVE PULMONARY DISEASE, UNSPECIFIED COPD TYPE (HCC): Chronic | ICD-10-CM

## 2025-01-17 RX ORDER — UMECLIDINIUM BROMIDE AND VILANTEROL TRIFENATATE 62.5; 25 UG/1; UG/1
1 POWDER RESPIRATORY (INHALATION) DAILY
Qty: 3 EACH | Refills: 3 | Status: SHIPPED | OUTPATIENT
Start: 2025-01-17

## 2025-01-17 NOTE — TELEPHONE ENCOUNTER
Recent Visits  Date Type Provider Dept   01/16/25 Office Visit Amador Miles, DO [facility]   10/16/24 Office Visit Amador Miles, DO [facility]   09/11/24 Office Visit Amador Miles, DO [facility]   09/03/24 Office Visit Amador Miles, DO [facility]   05/16/24 Office Visit Amador Miles, DO [facility]   04/01/24 Office Visit Amador Miles, DO [facility]   08/30/23 Office Visit Khadar Moyer, APRN - CNP [facility]   Showing recent visits within past 540 days with a meds authorizing provider and meeting all other requirements  Future Appointments  No visits were found meeting these conditions.  Showing future appointments within next 150 days with a meds authorizing provider and meeting all other requirements

## 2025-01-20 NOTE — ED TRIAGE NOTES
Pt presents to ED through triage with c/o abdominal pain. Pt states this pain started last light and has progressively gotten worse. Pt is diaphoretic and rates pain 10/10 in abdomen. Pt states she has not had an appetite and has vomited twice since last night. Pt states she has not take an medications to relieve the pain. Pt placed on tele. Pt A&O x4. Resp even and unlabored. Call light in reach. Will continue to monitor. [facility]  - [facility]

## 2025-04-09 DIAGNOSIS — I10 ESSENTIAL HYPERTENSION: ICD-10-CM

## 2025-04-09 RX ORDER — AMLODIPINE BESYLATE 10 MG/1
10 TABLET ORAL DAILY
Qty: 90 TABLET | Refills: 3 | Status: SHIPPED | OUTPATIENT
Start: 2025-04-09

## 2025-04-09 NOTE — TELEPHONE ENCOUNTER
Recent Visits  Date Type Provider Dept   01/16/25 Office Visit Amador Miles, DO [facility]   10/16/24 Office Visit Amador Miles, DO [facility]   09/11/24 Office Visit Amador Miles, DO [facility]   09/03/24 Office Visit Amador Miles, DO [facility]   05/16/24 Office Visit Amador Miles, DO [facility]   04/01/24 Office Visit Amador Miles, DO [facility]   Showing recent visits within past 540 days with a meds authorizing provider and meeting all other requirements  Future Appointments  Date Type Provider Dept   07/16/25 Appointment Amador Miles, DO [facility]   Showing future appointments within next 150 days with a meds authorizing provider and meeting all other requirements

## 2025-04-14 DIAGNOSIS — F17.210 CIGARETTE NICOTINE DEPENDENCE WITHOUT COMPLICATION: ICD-10-CM

## 2025-04-14 RX ORDER — BUPROPION HYDROCHLORIDE 150 MG/1
150 TABLET, EXTENDED RELEASE ORAL 2 TIMES DAILY
Qty: 180 TABLET | Refills: 0 | Status: SHIPPED | OUTPATIENT
Start: 2025-04-14

## 2025-05-09 ENCOUNTER — HOSPITAL ENCOUNTER (OUTPATIENT)
Dept: PULMONOLOGY | Age: 65
Discharge: HOME | End: 2025-05-09
Payer: MEDICARE

## 2025-05-09 ENCOUNTER — HOSPITAL ENCOUNTER (OUTPATIENT)
Dept: CT IMAGING | Age: 65
Discharge: HOME | End: 2025-05-09
Payer: MEDICARE

## 2025-05-09 DIAGNOSIS — Z87.891 PERSONAL HISTORY OF TOBACCO USE: ICD-10-CM

## 2025-05-09 DIAGNOSIS — J44.9 MODERATE COPD (CHRONIC OBSTRUCTIVE PULMONARY DISEASE) (HCC): ICD-10-CM

## 2025-05-09 PROCEDURE — 94060 EVALUATION OF WHEEZING: CPT

## 2025-05-09 PROCEDURE — 71271 CT THORAX LUNG CANCER SCR C-: CPT

## 2025-05-09 PROCEDURE — 94729 DIFFUSING CAPACITY: CPT

## 2025-05-09 PROCEDURE — 94726 PLETHYSMOGRAPHY LUNG VOLUMES: CPT

## 2025-05-15 ENCOUNTER — OFFICE VISIT (OUTPATIENT)
Dept: PULMONOLOGY | Age: 65
End: 2025-05-15
Payer: MEDICARE

## 2025-05-15 VITALS
TEMPERATURE: 97.8 F | HEIGHT: 65 IN | WEIGHT: 139.4 LBS | DIASTOLIC BLOOD PRESSURE: 64 MMHG | HEART RATE: 80 BPM | SYSTOLIC BLOOD PRESSURE: 122 MMHG | BODY MASS INDEX: 23.22 KG/M2 | OXYGEN SATURATION: 96 %

## 2025-05-15 DIAGNOSIS — Z87.891 PERSONAL HISTORY OF TOBACCO USE: ICD-10-CM

## 2025-05-15 DIAGNOSIS — J44.9 MODERATE COPD (CHRONIC OBSTRUCTIVE PULMONARY DISEASE) (HCC): Primary | ICD-10-CM

## 2025-05-15 DIAGNOSIS — R94.2 DECREASED DIFFUSION CAPACITY OF LUNG: ICD-10-CM

## 2025-05-15 DIAGNOSIS — J44.89 COPD WITH ASTHMA (HCC): ICD-10-CM

## 2025-05-15 PROCEDURE — G8420 CALC BMI NORM PARAMETERS: HCPCS

## 2025-05-15 PROCEDURE — 1159F MED LIST DOCD IN RCRD: CPT

## 2025-05-15 PROCEDURE — 4004F PT TOBACCO SCREEN RCVD TLK: CPT

## 2025-05-15 PROCEDURE — 99214 OFFICE O/P EST MOD 30 MIN: CPT

## 2025-05-15 PROCEDURE — 3017F COLORECTAL CA SCREEN DOC REV: CPT

## 2025-05-15 PROCEDURE — G8399 PT W/DXA RESULTS DOCUMENT: HCPCS

## 2025-05-15 PROCEDURE — 1090F PRES/ABSN URINE INCON ASSESS: CPT

## 2025-05-15 PROCEDURE — 3023F SPIROM DOC REV: CPT

## 2025-05-15 PROCEDURE — 3074F SYST BP LT 130 MM HG: CPT

## 2025-05-15 PROCEDURE — 3078F DIAST BP <80 MM HG: CPT

## 2025-05-15 PROCEDURE — 1123F ACP DISCUSS/DSCN MKR DOCD: CPT

## 2025-05-15 PROCEDURE — G8427 DOCREV CUR MEDS BY ELIG CLIN: HCPCS

## 2025-05-15 RX ORDER — FLUTICASONE FUROATE, UMECLIDINIUM BROMIDE AND VILANTEROL TRIFENATATE 100; 62.5; 25 UG/1; UG/1; UG/1
1 POWDER RESPIRATORY (INHALATION) DAILY
Qty: 3 EACH | Refills: 3 | Status: SHIPPED | OUTPATIENT
Start: 2025-05-15

## 2025-05-15 NOTE — PATIENT INSTRUCTIONS
-Stop Anoro   -Start Trelegy 1 puff daily   -Continue albuterol as needed for wheezing or shortness of breath

## 2025-05-15 NOTE — PROGRESS NOTES
[facility]    Patient: SHANITA GALAN, 64 y.o.   : 1960  5/15/2025    Pt of Dr. Goyal     Subjective   No chief complaint on file.       HPI  Shanita is here for follow up for copd 1 year with CT and pfts.      Overall patient reports respiratory symptoms have been fluctuating a bit since last appointment. Patient reports good compliance with inhaled medications (anoro). Patient using albuterol 3-4 times per day on average. Patient reports  physical limitation due to respiratory symptoms.   Having some shortness of breath that has recently gotten a bit worse, doesn't feel like anoro is helpig   She notices shortness of breath with less exertion than prior   Complaining of Runny nose at times, some congestion   Refused allergy testing   Pertinent negatives: Cough, Sputum Production, Hemoptysis, Wheezing, and Chest Tightness      Pulmonary history since last visit None      Pulmonary medications and how they are currently being taken by patient  Anoro Ellipta 1 puff daily   Albuterol rare      Smoking history  Current 1 pack every 2 days (doesn;t smoke around [employer] [facility])  Previously 1ppd x 46 years     Progress History:   Since last visit any new medical issues? No  New ER or hospital visits? No  Any new or changes in medicines? No  Using inhalers? Yes   Are they helpful? Yes   Flu vaccine not seen in chart   Pneumonia vaccine not seen in chart   Last PFT: 2025 PFTs: FVC 75%, FEV1 49%, FEV1/FVC 64%. FEV1/FVC ratio verifies obstructive disease. FEV1 shows moderate obstructive disease. Bronchodilator challenge is diagnostic of  asthmatic component. DLCO is moderately diminished.  Last 6 MWT: 2024, no O2 requirements   Last A1AT: MM    Past Medical hx   PMH:  Past Medical History:   Diagnosis Date    AAA (abdominal aortic aneurysm) 2014    Follows with Dr. Molina's group    Chronic pancreatitis ([facility])     COPD (chronic obstructive pulmonary disease) ([facility])

## 2025-05-16 ASSESSMENT — ENCOUNTER SYMPTOMS
CHOKING: 0
TROUBLE SWALLOWING: 0
SINUS PAIN: 0
CHEST TIGHTNESS: 0
COLOR CHANGE: 0
DIARRHEA: 0
NAUSEA: 0
VOMITING: 0
SINUS PRESSURE: 0
APNEA: 0
COUGH: 0
WHEEZING: 0

## 2025-05-25 DIAGNOSIS — G47.00 INSOMNIA, UNSPECIFIED TYPE: ICD-10-CM

## 2025-05-27 RX ORDER — TRAZODONE HYDROCHLORIDE 50 MG/1
50 TABLET ORAL NIGHTLY
Qty: 90 TABLET | Refills: 3 | Status: SHIPPED | OUTPATIENT
Start: 2025-05-27

## 2025-06-18 ENCOUNTER — TELEPHONE (OUTPATIENT)
Dept: FAMILY MEDICINE CLINIC | Age: 65
End: 2025-06-18

## 2025-06-18 DIAGNOSIS — E53.8 B12 DEFICIENCY: ICD-10-CM

## 2025-06-18 DIAGNOSIS — I10 ESSENTIAL HYPERTENSION: Primary | Chronic | ICD-10-CM

## 2025-06-18 DIAGNOSIS — R73.9 HYPERGLYCEMIA: ICD-10-CM

## 2025-06-18 DIAGNOSIS — E53.1 VITAMIN B6 DEFICIENCY: ICD-10-CM

## 2025-06-18 DIAGNOSIS — E55.9 VITAMIN D DEFICIENCY: ICD-10-CM

## 2025-06-19 NOTE — TELEPHONE ENCOUNTER
Pt due for fasting labs prior to next apt on 7/16/2025. Please call to have pt complete this. Thanks!    ASSESSMENT & PLAN   Diagnosis Orders   1. Essential hypertension  CBC with Auto Differential    Comprehensive Metabolic Panel    Lipid Panel    TSH reflex to FT4    Albumin/Creatinine Ratio, Urine      2. Hyperglycemia  Hemoglobin A1C      3. B12 deficiency  Vitamin B12      4. Vitamin D deficiency  Vitamin D 25 Hydroxy      5. Vitamin B6 deficiency  Vitamin B6        Future Appointments   Date Time Provider Department Center   7/16/2025 10:20 AM Amador Miles,  [facility] DE[facility]   8/25/2025  2:30 PM Grupo Courtney, PA-C [facility] [place]

## 2025-06-27 DIAGNOSIS — F33.42 RECURRENT MAJOR DEPRESSIVE DISORDER, IN FULL REMISSION: ICD-10-CM

## 2025-06-27 RX ORDER — ESCITALOPRAM OXALATE 10 MG/1
10 TABLET ORAL DAILY
Qty: 90 TABLET | Refills: 3 | Status: SHIPPED | OUTPATIENT
Start: 2025-06-27

## 2025-07-16 DIAGNOSIS — F17.210 CIGARETTE NICOTINE DEPENDENCE WITHOUT COMPLICATION: ICD-10-CM

## 2025-07-17 RX ORDER — BUPROPION HYDROCHLORIDE 150 MG/1
150 TABLET, EXTENDED RELEASE ORAL 2 TIMES DAILY
Qty: 180 TABLET | Refills: 0 | Status: SHIPPED | OUTPATIENT
Start: 2025-07-17

## 2025-07-17 NOTE — TELEPHONE ENCOUNTER
Recent Visits  Date Type Provider Dept   01/16/25 Office Visit Amador Miles, DO [facility]   10/16/24 Office Visit Amador Miles, DO [facility]   09/11/24 Office Visit Amador Miles, DO [facility]   09/03/24 Office Visit Amador Miles, DO [facility]   05/16/24 Office Visit Amador Miles, DO [facility]   04/01/24 Office Visit Amador Miles, DO [facility]   Showing recent visits within past 540 days with a meds authorizing provider and meeting all other requirements  Future Appointments  Date Type Provider Dept   07/25/25 Appointment Amador Miles, DO [facility]   Showing future appointments within next 150 days with a meds authorizing provider and meeting all other requirements

## 2025-07-24 NOTE — PROGRESS NOTES
opposite hand  No  [place] weakly + bilaterally      ASSESSMENT & PLAN  1. Vertigo    Likely BBPV, but a bit of an atypical presentation  Send to vestibular rehab  Get VNG  F/u 6 wks  Consider MRI pending MRI and response to rehab    - [facility] Physical Therapy - [facility]  - [facility] Audiology - [facility]  - Audiometry with tympanometry; Future  - Videonystagmography; Future    2. Cigarette nicotine dependence without complication    Trial patches/gum  Failed wellbutrin  Intolerant of chantix    - nicotine (NICODERM CQ) 21 MG/24HR; Place 1 patch onto the skin every 24 hours Remove patch at bedtime. Reapply new patch next morning  Dispense: 42 patch; Refill: 0  - nicotine (NICODERM CQ) 14 MG/24HR; Place 1 patch onto the skin every 24 hours for 14 days Remove patch at bedtime. Reapply new patch next morning  Dispense: 14 patch; Refill: 0  - nicotine (NICODERM CQ) 7 MG/24HR; Place 1 patch onto the skin every 24 hours for 14 days Remove patch at bedtime. Reapply new patch next morning  Dispense: 14 patch; Refill: 0  - nicotine polacrilex (NICORETTE) 2 MG gum; Take 1 each by mouth every 2 hours as needed for Smoking cessation  Dispense: 110 each; Refill: 3    3. Chronic fatigue    Better than prior  Con't B6, 12 and D  F/u labs ordered    - CBC with Auto Differential; Future  - Vitamin B12; Future  - Vitamin B6; Future    4. Vitamin D deficiency    As per # 3    - Vitamin D 25 Hydroxy; Future    5. B12 deficiency    As per # 3    - cyanocobalamin ([employer] VITAMIN B12) 1000 MCG tablet; Take 1 tablet by mouth daily  Dispense: 90 tablet; Refill: 3  - CBC with Auto Differential; Future  - Vitamin B12; Future  - Vitamin B6; Future    6. Vitamin B6 deficiency    As per # 3    - pyridoxine (B-6) 25 MG tablet; Take 1 tablet by mouth daily  Dispense: 90 tablet; Refill: 3  - CBC with Auto Differential; Future  - Vitamin B12; Future  - Vitamin B6; Future    7. Chronic pancreatitis, unspecified pancreatitis type (HCC)    Con't

## 2025-07-24 NOTE — PATIENT INSTRUCTIONS
LAB INSTRUCTIONS:    Please complete labs within 1 week(s).    Please fast for 8 hours prior to lab collection.    The clinic will call you within 1 week of collection. If you have not heard from us within that amount of time, please call us at [phone].

## 2025-07-25 ENCOUNTER — OFFICE VISIT (OUTPATIENT)
Dept: FAMILY MEDICINE CLINIC | Age: 65
End: 2025-07-25
Payer: MEDICARE

## 2025-07-25 VITALS
DIASTOLIC BLOOD PRESSURE: 70 MMHG | HEART RATE: 61 BPM | HEIGHT: 65 IN | TEMPERATURE: 97.7 F | RESPIRATION RATE: 16 BRPM | WEIGHT: 136.6 LBS | BODY MASS INDEX: 22.76 KG/M2 | OXYGEN SATURATION: 92 % | SYSTOLIC BLOOD PRESSURE: 124 MMHG

## 2025-07-25 DIAGNOSIS — I10 ESSENTIAL HYPERTENSION: Chronic | ICD-10-CM

## 2025-07-25 DIAGNOSIS — R10.13 CHRONIC EPIGASTRIC PAIN: ICD-10-CM

## 2025-07-25 DIAGNOSIS — K21.9 GASTROESOPHAGEAL REFLUX DISEASE WITHOUT ESOPHAGITIS: ICD-10-CM

## 2025-07-25 DIAGNOSIS — G47.00 INSOMNIA, UNSPECIFIED TYPE: ICD-10-CM

## 2025-07-25 DIAGNOSIS — R73.03 PREDIABETES: Chronic | ICD-10-CM

## 2025-07-25 DIAGNOSIS — I71.43 INFRARENAL ABDOMINAL AORTIC ANEURYSM (AAA) WITHOUT RUPTURE: ICD-10-CM

## 2025-07-25 DIAGNOSIS — F17.210 CIGARETTE NICOTINE DEPENDENCE WITHOUT COMPLICATION: ICD-10-CM

## 2025-07-25 DIAGNOSIS — I69.30 HISTORY OF STROKE WITH RESIDUAL DEFICIT: Chronic | ICD-10-CM

## 2025-07-25 DIAGNOSIS — F33.42 RECURRENT MAJOR DEPRESSIVE DISORDER, IN FULL REMISSION: ICD-10-CM

## 2025-07-25 DIAGNOSIS — E78.5 DYSLIPIDEMIA: Chronic | ICD-10-CM

## 2025-07-25 DIAGNOSIS — R53.82 CHRONIC FATIGUE: ICD-10-CM

## 2025-07-25 DIAGNOSIS — J44.9 CHRONIC OBSTRUCTIVE PULMONARY DISEASE, UNSPECIFIED COPD TYPE (HCC): ICD-10-CM

## 2025-07-25 DIAGNOSIS — E53.8 B12 DEFICIENCY: ICD-10-CM

## 2025-07-25 DIAGNOSIS — R42 VERTIGO: Primary | ICD-10-CM

## 2025-07-25 DIAGNOSIS — E53.1 VITAMIN B6 DEFICIENCY: ICD-10-CM

## 2025-07-25 DIAGNOSIS — K86.1 CHRONIC PANCREATITIS, UNSPECIFIED PANCREATITIS TYPE (HCC): ICD-10-CM

## 2025-07-25 DIAGNOSIS — Z12.31 ENCOUNTER FOR SCREENING MAMMOGRAM FOR MALIGNANT NEOPLASM OF BREAST: ICD-10-CM

## 2025-07-25 DIAGNOSIS — M85.80 OSTEOPENIA, UNSPECIFIED LOCATION: ICD-10-CM

## 2025-07-25 DIAGNOSIS — G89.29 CHRONIC EPIGASTRIC PAIN: ICD-10-CM

## 2025-07-25 DIAGNOSIS — R73.9 HYPERGLYCEMIA: ICD-10-CM

## 2025-07-25 DIAGNOSIS — R11.0 CHRONIC NAUSEA: ICD-10-CM

## 2025-07-25 DIAGNOSIS — E55.9 VITAMIN D DEFICIENCY: ICD-10-CM

## 2025-07-25 PROCEDURE — 1159F MED LIST DOCD IN RCRD: CPT | Performed by: FAMILY MEDICINE

## 2025-07-25 PROCEDURE — G8427 DOCREV CUR MEDS BY ELIG CLIN: HCPCS | Performed by: FAMILY MEDICINE

## 2025-07-25 PROCEDURE — 99214 OFFICE O/P EST MOD 30 MIN: CPT | Performed by: FAMILY MEDICINE

## 2025-07-25 PROCEDURE — G8399 PT W/DXA RESULTS DOCUMENT: HCPCS | Performed by: FAMILY MEDICINE

## 2025-07-25 PROCEDURE — 3023F SPIROM DOC REV: CPT | Performed by: FAMILY MEDICINE

## 2025-07-25 PROCEDURE — 3017F COLORECTAL CA SCREEN DOC REV: CPT | Performed by: FAMILY MEDICINE

## 2025-07-25 PROCEDURE — 4004F PT TOBACCO SCREEN RCVD TLK: CPT | Performed by: FAMILY MEDICINE

## 2025-07-25 PROCEDURE — 1123F ACP DISCUSS/DSCN MKR DOCD: CPT | Performed by: FAMILY MEDICINE

## 2025-07-25 PROCEDURE — 3074F SYST BP LT 130 MM HG: CPT | Performed by: FAMILY MEDICINE

## 2025-07-25 PROCEDURE — G8420 CALC BMI NORM PARAMETERS: HCPCS | Performed by: FAMILY MEDICINE

## 2025-07-25 PROCEDURE — 1160F RVW MEDS BY RX/DR IN RCRD: CPT | Performed by: FAMILY MEDICINE

## 2025-07-25 PROCEDURE — 3078F DIAST BP <80 MM HG: CPT | Performed by: FAMILY MEDICINE

## 2025-07-25 PROCEDURE — 1090F PRES/ABSN URINE INCON ASSESS: CPT | Performed by: FAMILY MEDICINE

## 2025-07-25 RX ORDER — PYRIDOXINE HCL (VITAMIN B6) 25 MG
25 TABLET ORAL DAILY
Qty: 90 TABLET | Refills: 3 | Status: SHIPPED | OUTPATIENT
Start: 2025-07-25

## 2025-07-25 RX ORDER — NICOTINE 21 MG/24HR
1 PATCH, TRANSDERMAL 24 HOURS TRANSDERMAL EVERY 24 HOURS
Qty: 14 PATCH | Refills: 0 | Status: SHIPPED | OUTPATIENT
Start: 2025-09-05 | End: 2025-09-19

## 2025-07-25 RX ORDER — NICOTINE 21 MG/24HR
1 PATCH, TRANSDERMAL 24 HOURS TRANSDERMAL EVERY 24 HOURS
Qty: 42 PATCH | Refills: 0 | Status: SHIPPED | OUTPATIENT
Start: 2025-07-25 | End: 2025-09-05

## 2025-07-28 ENCOUNTER — HOSPITAL ENCOUNTER (OUTPATIENT)
Dept: PHYSICAL THERAPY | Age: 65
Setting detail: THERAPIES SERIES
Discharge: HOME | End: 2025-07-28
Attending: FAMILY MEDICINE

## 2025-07-28 ENCOUNTER — APPOINTMENT (OUTPATIENT)
Dept: GENERAL RADIOLOGY | Age: 65
End: 2025-07-28
Payer: MEDICARE

## 2025-07-28 ENCOUNTER — HOSPITAL ENCOUNTER (EMERGENCY)
Age: 65
Discharge: HOME | End: 2025-07-28
Attending: EMERGENCY MEDICINE
Payer: MEDICARE

## 2025-07-28 VITALS
BODY MASS INDEX: 22.96 KG/M2 | WEIGHT: 138 LBS | RESPIRATION RATE: 15 BRPM | HEART RATE: 95 BPM | DIASTOLIC BLOOD PRESSURE: 55 MMHG | SYSTOLIC BLOOD PRESSURE: 113 MMHG | OXYGEN SATURATION: 90 % | TEMPERATURE: 98 F

## 2025-07-28 DIAGNOSIS — J44.9 MODERATE COPD (CHRONIC OBSTRUCTIVE PULMONARY DISEASE) (HCC): ICD-10-CM

## 2025-07-28 DIAGNOSIS — J44.1 COPD EXACERBATION (HCC): Primary | ICD-10-CM

## 2025-07-28 LAB
ANION GAP SERPL CALC-SCNC: 14 MEQ/L (ref 8–16)
APTT PPP: 32.6 SECONDS (ref 22–38)
BASOPHILS ABSOLUTE: 0 THOU/MM3 (ref 0–0.1)
BASOPHILS NFR BLD AUTO: 0.4 %
BUN SERPL-MCNC: 8 MG/DL (ref 8–23)
CALCIUM SERPL-MCNC: 9.8 MG/DL (ref 8.8–10.2)
CHLORIDE SERPL-SCNC: 108 MEQ/L (ref 98–111)
CO2 SERPL-SCNC: 23 MEQ/L (ref 22–29)
CREAT SERPL-MCNC: 1 MG/DL (ref 0.5–0.9)
DEPRECATED RDW RBC AUTO: 45.8 FL (ref 35–45)
EKG ATRIAL RATE: 84 BPM
EKG P AXIS: 49 DEGREES
EKG P-R INTERVAL: 124 MS
EKG Q-T INTERVAL: 402 MS
EKG QRS DURATION: 120 MS
EKG QTC CALCULATION (BAZETT): 475 MS
EKG R AXIS: 20 DEGREES
EKG T AXIS: 92 DEGREES
EKG VENTRICULAR RATE: 84 BPM
EOSINOPHIL NFR BLD AUTO: 1 %
EOSINOPHILS ABSOLUTE: 0.1 THOU/MM3 (ref 0–0.4)
ERYTHROCYTE [DISTWIDTH] IN BLOOD BY AUTOMATED COUNT: 13.4 % (ref 11.5–14.5)
GFR SERPL CREATININE-BSD FRML MDRD: 62 ML/MIN/1.73M2
GLUCOSE SERPL-MCNC: 125 MG/DL (ref 74–109)
HCT VFR BLD AUTO: 51.3 % (ref 37–47)
HGB BLD-MCNC: 16.8 GM/DL (ref 12–16)
IMM GRANULOCYTES # BLD AUTO: 0.02 THOU/MM3 (ref 0–0.07)
IMM GRANULOCYTES NFR BLD AUTO: 0.2 %
INR PPP: 0.99 (ref 0.85–1.13)
LIPASE SERPL-CCNC: 18 U/L (ref 13–60)
LYMPHOCYTES ABSOLUTE: 2 THOU/MM3 (ref 1–4.8)
LYMPHOCYTES NFR BLD AUTO: 24.4 %
MAGNESIUM SERPL-MCNC: 2 MG/DL (ref 1.6–2.6)
MCH RBC QN AUTO: 30.5 PG (ref 26–33)
MCHC RBC AUTO-ENTMCNC: 32.7 GM/DL (ref 32.2–35.5)
MCV RBC AUTO: 93.1 FL (ref 81–99)
MONOCYTES ABSOLUTE: 0.6 THOU/MM3 (ref 0.4–1.3)
MONOCYTES NFR BLD AUTO: 8 %
NEUTROPHILS ABSOLUTE: 5.3 THOU/MM3 (ref 1.8–7.7)
NEUTROPHILS NFR BLD AUTO: 66 %
NRBC BLD AUTO-RTO: 0 /100 WBC
NT-PROBNP SERPL IA-MCNC: 244 PG/ML (ref 0–124)
OSMOLALITY SERPL CALC.SUM OF ELEC: 288.5 MOSMOL/KG (ref 275–300)
PLATELET # BLD AUTO: 215 THOU/MM3 (ref 130–400)
PMV BLD AUTO: 10.7 FL (ref 9.4–12.4)
POTASSIUM SERPL-SCNC: 3.5 MEQ/L (ref 3.5–5.2)
PROTHROMBIN TIME: 11.6 SECONDS (ref 10–13.5)
RBC # BLD AUTO: 5.51 MILL/MM3 (ref 4.2–5.4)
SODIUM SERPL-SCNC: 145 MEQ/L (ref 135–145)
TROPONIN, HIGH SENSITIVITY: 10 NG/L (ref 0–12)
WBC # BLD AUTO: 8.1 THOU/MM3 (ref 4.8–10.8)

## 2025-07-28 PROCEDURE — 2500000003 HC RX 250 WO HCPCS: Performed by: EMERGENCY MEDICINE

## 2025-07-28 PROCEDURE — 6370000000 HC RX 637 (ALT 250 FOR IP): Performed by: EMERGENCY MEDICINE

## 2025-07-28 PROCEDURE — 83880 ASSAY OF NATRIURETIC PEPTIDE: CPT

## 2025-07-28 PROCEDURE — 93005 ELECTROCARDIOGRAM TRACING: CPT | Performed by: EMERGENCY MEDICINE

## 2025-07-28 PROCEDURE — 36415 COLL VENOUS BLD VENIPUNCTURE: CPT

## 2025-07-28 PROCEDURE — 83735 ASSAY OF MAGNESIUM: CPT

## 2025-07-28 PROCEDURE — 85025 COMPLETE CBC W/AUTO DIFF WBC: CPT

## 2025-07-28 PROCEDURE — 85610 PROTHROMBIN TIME: CPT

## 2025-07-28 PROCEDURE — 83690 ASSAY OF LIPASE: CPT

## 2025-07-28 PROCEDURE — 94761 N-INVAS EAR/PLS OXIMETRY MLT: CPT

## 2025-07-28 PROCEDURE — 94640 AIRWAY INHALATION TREATMENT: CPT

## 2025-07-28 PROCEDURE — 85730 THROMBOPLASTIN TIME PARTIAL: CPT

## 2025-07-28 PROCEDURE — 80048 BASIC METABOLIC PNL TOTAL CA: CPT

## 2025-07-28 PROCEDURE — 96374 THER/PROPH/DIAG INJ IV PUSH: CPT

## 2025-07-28 PROCEDURE — 99285 EMERGENCY DEPT VISIT HI MDM: CPT

## 2025-07-28 PROCEDURE — 71046 X-RAY EXAM CHEST 2 VIEWS: CPT

## 2025-07-28 PROCEDURE — 93010 ELECTROCARDIOGRAM REPORT: CPT | Performed by: INTERNAL MEDICINE

## 2025-07-28 PROCEDURE — 84484 ASSAY OF TROPONIN QUANT: CPT

## 2025-07-28 PROCEDURE — 6360000002 HC RX W HCPCS: Performed by: EMERGENCY MEDICINE

## 2025-07-28 RX ORDER — IPRATROPIUM BROMIDE AND ALBUTEROL SULFATE 2.5; .5 MG/3ML; MG/3ML
1 SOLUTION RESPIRATORY (INHALATION) EVERY 4 HOURS PRN
Qty: 360 ML | Refills: 0 | Status: SHIPPED | OUTPATIENT
Start: 2025-07-28

## 2025-07-28 RX ORDER — AZITHROMYCIN 250 MG/1
TABLET, FILM COATED ORAL
Qty: 6 TABLET | Refills: 0 | Status: SHIPPED | OUTPATIENT
Start: 2025-07-28 | End: 2025-08-07

## 2025-07-28 RX ORDER — IPRATROPIUM BROMIDE AND ALBUTEROL SULFATE 2.5; .5 MG/3ML; MG/3ML
2 SOLUTION RESPIRATORY (INHALATION) ONCE
Status: COMPLETED | OUTPATIENT
Start: 2025-07-28 | End: 2025-07-28

## 2025-07-28 RX ORDER — PREDNISONE 20 MG/1
40 TABLET ORAL DAILY
Qty: 10 TABLET | Refills: 0 | Status: SHIPPED | OUTPATIENT
Start: 2025-07-28 | End: 2025-08-02

## 2025-07-28 RX ORDER — IPRATROPIUM BROMIDE AND ALBUTEROL SULFATE 2.5; .5 MG/3ML; MG/3ML
1 SOLUTION RESPIRATORY (INHALATION) ONCE
Status: COMPLETED | OUTPATIENT
Start: 2025-07-28 | End: 2025-07-28

## 2025-07-28 RX ORDER — AZITHROMYCIN 250 MG/1
500 TABLET, FILM COATED ORAL ONCE
Status: COMPLETED | OUTPATIENT
Start: 2025-07-28 | End: 2025-07-28

## 2025-07-28 RX ADMIN — AZITHROMYCIN DIHYDRATE 500 MG: 250 TABLET ORAL at 13:08

## 2025-07-28 RX ADMIN — IPRATROPIUM BROMIDE AND ALBUTEROL SULFATE 2 DOSE: .5; 3 SOLUTION RESPIRATORY (INHALATION) at 13:02

## 2025-07-28 RX ADMIN — WATER 40 MG: 1 INJECTION INTRAMUSCULAR; INTRAVENOUS; SUBCUTANEOUS at 13:08

## 2025-07-28 RX ADMIN — IPRATROPIUM BROMIDE AND ALBUTEROL SULFATE 1 DOSE: .5; 3 SOLUTION RESPIRATORY (INHALATION) at 14:15

## 2025-07-28 NOTE — ED NOTES
Patient placed on 2L NC at this time due to oxygen down to 89% and labored breathing. Call light in reach.

## 2025-07-28 NOTE — DISCHARGE INSTR - COC
Continuity of Care Form    Patient Name: Keshia Ramirez   :  1960  MRN:  [id number]    Admit date:  2025  Discharge date:  ***    Code Status Order: Prior   Advance Directives:     Admitting Physician:  No admitting provider for patient encounter.  PCP: Amador Miles DO    Discharging Nurse: ***  Discharging Hospital Unit/Room#: 20020A  Discharging Unit Phone Number: ***    Emergency Contact:   Extended Emergency Contact Information  Primary Emergency Contact: Norma Ramirez   [facility]  Home Phone: [phone]  Mobile Phone: [phone]  Relation: Child   needed? No  Secondary Emergency Contact: CHULAAISHA   [facility]  Home Phone: [phone]  Mobile Phone: [phone]  Relation: Domestic Partner   needed? No    Past Surgical History:  Past Surgical History:   Procedure Laterality Date    ABDOMEN SURGERY      c sections x3    CARPAL TUNNEL RELEASE Bilateral      SECTION      x3    COLONOSCOPY      Dr. Bardales    EGD  ,,    INSERTABLE CARDIAC MONITOR      TONSILLECTOMY      as child    UPPER GASTROINTESTINAL ENDOSCOPY         Immunization History:   Immunization History   Administered Date(s) Administered    COVID-19, J&J, (age 18y+), IM, 0.5 mL 2021    Influenza, FLUAD, (age 65 y+), IM, Trivalent PF, 0.5mL 10/16/2024    Influenza, FLUCELVAX, (age 6 mo+), MDCK, Quadv PF, 0.5mL 2022    Pneumococcal, PCV20, PREVNAR 20, (age 6w+), IM, 0.5mL 2024    Pneumococcal, PPSV23, PNEUMOVAX 23, (age 2y+), SC/IM, 0.5mL 2014       Active Problems:  Patient Active Problem List   Diagnosis Code    Nicotine dependence F17.200    COPD (chronic obstructive pulmonary disease) ([facility]) J44.9    Obesity (BMI 30-39.9) E66.9    Essential hypertension I10    Chronic pancreatitis ([facility]) K86.1    Dyslipidemia E78.5    AAA (abdominal aortic aneurysm) I71.40    GERD (gastroesophageal reflux disease) K21.9

## 2025-07-28 NOTE — DISCHARGE INSTRUCTIONS
Return to the ED if you have any new or changing symptoms such as shortness of breath, declining breathing, fever, difficulty ambulating, or you have any other concerns.    Continue using your home inhalers.  Start nebulizer treatment use DuoNeb treatment every 4 hours while awake for the next 2 days.  Then start as needed for wheezing.

## 2025-07-28 NOTE — ED PROVIDER NOTES
EMERGENCY DEPARTMENT ENCOUNTER     CHIEF COMPLAINT   Chief Complaint   Patient presents with   • Shortness of Breath        HPI   Keshia Ramirez is a 64 y.o. female who presents with shortness of breath and cough onset was 3 days ago. The duration has been constant. Shortness of breath is described as wheezing. It is associated with productive cough of green yellow phlegm without blood. Patient has not tried anything for relief as she states she does not have home breathing treatments or a rescue inhaler.  She reports she was on O2 for sometime but sent it back to the company because she states she doesn't like to wear the nasal cannula    REVIEW OF SYSTEMS   Cardiac: neg dyspnea on exertion; neg Chest Pain, Denies syncope   Respiratory: +sob and dyspnea; + productive cough no hemoptysis   GI: Denies Vomiting or Diarrhea   General: Denies Fever   All other review of systems are reviewed and are otherwise negative.     PAST MEDICAL & SURGICAL HISTORY   Past Medical History:   Diagnosis Date   • AAA (abdominal aortic aneurysm) 2014    Follows with Dr. Molina's group   • Chronic pancreatitis (HCC)    • COPD (chronic obstructive pulmonary disease) ([facility])    • Dyslipidemia    • Essential hypertension 2014   • History of colon polyps    • History of stroke with residual deficit; cryptogenic 2017    Follows with neuro and cardio at [facility]   • Nicotine dependence    • Osteoarthritis    • Osteopenia 05/10/2024      Past Surgical History:   Procedure Laterality Date   • ABDOMEN SURGERY      c sections x3   • CARPAL TUNNEL RELEASE Bilateral    •  SECTION      x3   • COLONOSCOPY      Dr. Bardales   • EGD  ,,   • INSERTABLE CARDIAC MONITOR     • TONSILLECTOMY      as child   • UPPER GASTROINTESTINAL ENDOSCOPY          CURRENT MEDICATIONS   Current Outpatient Rx   Medication Sig Dispense Refill   • nicotine (NICODERM CQ) 21 MG/24HR Place 1 patch onto the skin every 24 hours Remove

## 2025-07-30 ENCOUNTER — TELEPHONE (OUTPATIENT)
Dept: FAMILY MEDICINE CLINIC | Age: 65
End: 2025-07-30

## 2025-07-30 DIAGNOSIS — J44.9 CHRONIC OBSTRUCTIVE PULMONARY DISEASE, UNSPECIFIED COPD TYPE (HCC): Primary | ICD-10-CM

## 2025-07-30 RX ORDER — NEBULIZER ACCESSORIES
KIT MISCELLANEOUS
Qty: 1 KIT | Refills: 1 | Status: SHIPPED | OUTPATIENT
Start: 2025-07-30

## 2025-07-30 NOTE — TELEPHONE ENCOUNTER
Yeah, the patches are OTC, so they may not    She can call 1-800-QUIT-NOW and may be able to get them covered through them    For the neb, insurance won't cover it at a pharmacy, but typically will via a medical equipment store.     I've wrote out paper rx to send to [facility] and she can try to get it there.     Let me know if questions, thanks!     Diagnosis Orders   1. Chronic obstructive pulmonary disease, unspecified COPD type (HCC)  Nebulizers (COMPRESSOR NEBULIZER) [facility]    Respiratory Therapy Supplies (NEBULIZER/TUBING/MOUTHPIECE) KIT

## 2025-07-30 NOTE — TELEPHONE ENCOUNTER
----- Message from Dr. Amador Miles, DO sent at 7/30/2025  7:07 AM EDT -----  Please call pt and see how breathing is today post ER visit.   Let me know, thanks!

## 2025-07-30 NOTE — TELEPHONE ENCOUNTER
Patient called back and breathing is a lot better, they sent a script for nebulizer but insurance won't cover this for her. Also not covering the patches for nicotine either.

## 2025-07-31 NOTE — TELEPHONE ENCOUNTER
I spoke with Jd, he stated that he did prepare the written rx and set it in his outgoing basket to be taken care of yesterday.

## 2025-07-31 NOTE — TELEPHONE ENCOUNTER
Pt returned call to the office is shared with her the information provided by Dr. Miles regarding the Quit now line for nicotine patches and [facility] for her nebulizer. Pt states will contact both, also states that she has been breathing much better since breathing tx at hospital. I will verify with  that the script was faxed yesterday to [facility], if not I will fax.

## 2025-08-21 DIAGNOSIS — G89.29 CHRONIC EPIGASTRIC PAIN: ICD-10-CM

## 2025-08-21 DIAGNOSIS — K21.9 GASTROESOPHAGEAL REFLUX DISEASE WITHOUT ESOPHAGITIS: Chronic | ICD-10-CM

## 2025-08-21 DIAGNOSIS — R10.13 CHRONIC EPIGASTRIC PAIN: ICD-10-CM

## 2025-08-21 RX ORDER — PANTOPRAZOLE SODIUM 40 MG/1
40 TABLET, DELAYED RELEASE ORAL
Qty: 90 TABLET | Refills: 3 | Status: SHIPPED | OUTPATIENT
Start: 2025-08-21